# Patient Record
Sex: FEMALE | Race: OTHER | Employment: OTHER | URBAN - METROPOLITAN AREA
[De-identification: names, ages, dates, MRNs, and addresses within clinical notes are randomized per-mention and may not be internally consistent; named-entity substitution may affect disease eponyms.]

---

## 2017-06-07 ENCOUNTER — OFFICE VISIT (OUTPATIENT)
Dept: INTERNAL MEDICINE CLINIC | Facility: CLINIC | Age: 72
End: 2017-06-07

## 2017-06-07 VITALS
WEIGHT: 147 LBS | SYSTOLIC BLOOD PRESSURE: 158 MMHG | BODY MASS INDEX: 28.12 KG/M2 | TEMPERATURE: 98 F | OXYGEN SATURATION: 93 % | HEART RATE: 80 BPM | DIASTOLIC BLOOD PRESSURE: 90 MMHG | HEIGHT: 60.75 IN

## 2017-06-07 DIAGNOSIS — T14.8XXA INJURY TO NERVES: ICD-10-CM

## 2017-06-07 DIAGNOSIS — Z78.0 POSTMENOPAUSAL: ICD-10-CM

## 2017-06-07 DIAGNOSIS — M85.859 OSTEOPENIA OF THIGH, UNSPECIFIED LATERALITY: ICD-10-CM

## 2017-06-07 DIAGNOSIS — E78.00 HYPERCHOLESTEROLEMIA: Primary | ICD-10-CM

## 2017-06-07 DIAGNOSIS — Z12.39 SCREENING FOR MALIGNANT NEOPLASM OF BREAST: ICD-10-CM

## 2017-06-07 PROCEDURE — G0463 HOSPITAL OUTPT CLINIC VISIT: HCPCS | Performed by: INTERNAL MEDICINE

## 2017-06-07 PROCEDURE — 99214 OFFICE O/P EST MOD 30 MIN: CPT | Performed by: INTERNAL MEDICINE

## 2017-06-07 NOTE — PROGRESS NOTES
Sherri Allen is a 70year old female. HPI:   She did well in [place] and had no ED visits, illnesses etc.       SR: no chest pain or sob, no gu or gi sx.         1. Hypercholesterolemia - her LDL has been abound 160 - no meds and no significant past hx of CV RRR without murmur  GI: good BS's,no masses, HSM or tenderness  EXTREMITIES: no cyanosis, clubbing or edema  Breasts: no masses, no axillary adenopathy    ASSESSMENT AND PLAN:   1. Hypercholesterolemia - labs pending      2.  Injury to nerves - LUE doing we

## 2017-06-08 ENCOUNTER — LAB ENCOUNTER (OUTPATIENT)
Dept: LAB | Age: 72
End: 2017-06-08
Attending: INTERNAL MEDICINE
Payer: MEDICARE

## 2017-06-08 DIAGNOSIS — Z12.39 SCREENING FOR MALIGNANT NEOPLASM OF BREAST: ICD-10-CM

## 2017-06-08 DIAGNOSIS — M85.859 OSTEOPENIA OF THIGH, UNSPECIFIED LATERALITY: ICD-10-CM

## 2017-06-08 DIAGNOSIS — Z78.0 POSTMENOPAUSAL: ICD-10-CM

## 2017-06-08 DIAGNOSIS — T14.8XXA INJURY TO NERVES: ICD-10-CM

## 2017-06-08 DIAGNOSIS — E78.00 HYPERCHOLESTEROLEMIA: ICD-10-CM

## 2017-06-08 PROCEDURE — 36415 COLL VENOUS BLD VENIPUNCTURE: CPT

## 2017-06-08 PROCEDURE — 80053 COMPREHEN METABOLIC PANEL: CPT

## 2017-06-08 PROCEDURE — 81015 MICROSCOPIC EXAM OF URINE: CPT

## 2017-06-08 PROCEDURE — 82306 VITAMIN D 25 HYDROXY: CPT

## 2017-06-08 PROCEDURE — 81001 URINALYSIS AUTO W/SCOPE: CPT

## 2017-06-08 PROCEDURE — 84443 ASSAY THYROID STIM HORMONE: CPT

## 2017-06-08 PROCEDURE — 85025 COMPLETE CBC W/AUTO DIFF WBC: CPT

## 2017-06-08 PROCEDURE — 80061 LIPID PANEL: CPT

## 2017-06-18 ENCOUNTER — TELEPHONE (OUTPATIENT)
Dept: INTERNAL MEDICINE CLINIC | Facility: CLINIC | Age: 72
End: 2017-06-18

## 2017-07-07 ENCOUNTER — TELEPHONE (OUTPATIENT)
Dept: INTERNAL MEDICINE CLINIC | Facility: CLINIC | Age: 72
End: 2017-07-07

## 2017-07-07 NOTE — TELEPHONE ENCOUNTER
I discussed positive COLOGUARD test with patient and the rec to proceed with colonoscopy - Evan Ackerman and Catalino Arredondo numbers given to patient. She indicates she will call them for an appointment.

## 2017-08-02 PROBLEM — Z12.11 COLON CANCER SCREENING: Status: ACTIVE | Noted: 2017-08-02

## 2017-08-10 ENCOUNTER — SURGERY (OUTPATIENT)
Age: 72
End: 2017-08-10

## 2017-08-10 ENCOUNTER — HOSPITAL ENCOUNTER (OUTPATIENT)
Facility: HOSPITAL | Age: 72
Setting detail: HOSPITAL OUTPATIENT SURGERY
Discharge: HOME | End: 2017-08-10
Attending: INTERNAL MEDICINE | Admitting: INTERNAL MEDICINE
Payer: MEDICARE

## 2017-08-10 VITALS
HEIGHT: 61 IN | BODY MASS INDEX: 27.19 KG/M2 | RESPIRATION RATE: 19 BRPM | SYSTOLIC BLOOD PRESSURE: 120 MMHG | HEART RATE: 98 BPM | WEIGHT: 144 LBS | OXYGEN SATURATION: 96 % | DIASTOLIC BLOOD PRESSURE: 65 MMHG

## 2017-08-10 DIAGNOSIS — Z12.11 SPECIAL SCREENING FOR MALIGNANT NEOPLASM OF COLON: ICD-10-CM

## 2017-08-10 PROCEDURE — 0DBN8ZX EXCISION OF SIGMOID COLON, VIA NATURAL OR ARTIFICIAL OPENING ENDOSCOPIC, DIAGNOSTIC: ICD-10-PCS | Performed by: INTERNAL MEDICINE

## 2017-08-10 PROCEDURE — 88305 TISSUE EXAM BY PATHOLOGIST: CPT | Performed by: INTERNAL MEDICINE

## 2017-08-10 RX ORDER — SODIUM CHLORIDE 0.9 % (FLUSH) 0.9 %
10 SYRINGE (ML) INJECTION AS NEEDED
Status: DISCONTINUED | OUTPATIENT
Start: 2017-08-10 | End: 2017-08-10

## 2017-08-10 RX ORDER — MIDAZOLAM HYDROCHLORIDE 1 MG/ML
INJECTION INTRAMUSCULAR; INTRAVENOUS
Status: DISCONTINUED | OUTPATIENT
Start: 2017-08-10 | End: 2017-08-10

## 2017-08-10 RX ORDER — MIDAZOLAM HYDROCHLORIDE 1 MG/ML
1 INJECTION INTRAMUSCULAR; INTRAVENOUS EVERY 5 MIN PRN
Status: DISCONTINUED | OUTPATIENT
Start: 2017-08-10 | End: 2017-08-10

## 2017-08-10 RX ORDER — SODIUM CHLORIDE 9 MG/ML
INJECTION, SOLUTION INTRAVENOUS CONTINUOUS
Status: DISCONTINUED | OUTPATIENT
Start: 2017-08-10 | End: 2017-08-10

## 2017-08-10 RX ORDER — SODIUM CHLORIDE, SODIUM LACTATE, POTASSIUM CHLORIDE, CALCIUM CHLORIDE 600; 310; 30; 20 MG/100ML; MG/100ML; MG/100ML; MG/100ML
INJECTION, SOLUTION INTRAVENOUS CONTINUOUS
Status: DISCONTINUED | OUTPATIENT
Start: 2017-08-10 | End: 2017-08-10

## 2017-08-10 NOTE — H&P
[facility] Patient Status:  [facility] Outpatient Surgery    1945 MRN [id number]   Location [facility] ENDOSCOPY LAB SUITES Attending Barron Solis MD   Hosp Day # 0 PCP Naheed Hobson.  Isael normal findings.        Results:     Lab Results  Component Value Date   WBC 7.2 06/08/2017   HGB 13.3 06/08/2017   HCT 40.5 06/08/2017    06/08/2017   CREATSERUM 0.77 06/08/2017   BUN 12 06/08/2017    06/08/2017   K 4.5 06/08/2017    06/

## 2017-08-10 NOTE — OPERATIVE REPORT
[facility]    Colonoscopy Report      Veronica Kumar Patient Status:  [facility]    1945 MRN [id number]   Location [facility] ENDOSCOPY LAB SUITES Attending Ricky Hollingsworth MD       DATE OF OPERATION: 8/10/20 unremarkable. At the anal verge, grade 2 internal hemorrhoids are identified. The quality of the bowel prep was excellent    The colon withdrawal time was 9 minutes.     RECOMMENDATIONS:    High fiber diet  Await path results      Philip Davalos MD  8/10

## 2017-08-15 NOTE — PROGRESS NOTES
Unable to reach pt. Per HIPPA guidelines left detailed voice message both #'s on file re: recommendations/test results noted below by Dr. Corrinne Pleasant. Instructed to call back with any questions or concerns [phone].

## 2017-08-21 ENCOUNTER — HOSPITAL ENCOUNTER (OUTPATIENT)
Dept: BONE DENSITY | Facility: HOSPITAL | Age: 72
End: 2017-08-21
Attending: INTERNAL MEDICINE
Payer: MEDICARE

## 2017-08-21 ENCOUNTER — HOSPITAL ENCOUNTER (OUTPATIENT)
Dept: MAMMOGRAPHY | Facility: HOSPITAL | Age: 72
Discharge: HOME | End: 2017-08-21
Attending: INTERNAL MEDICINE
Payer: MEDICARE

## 2017-08-21 DIAGNOSIS — Z78.0 POSTMENOPAUSAL: ICD-10-CM

## 2017-08-21 DIAGNOSIS — Z12.39 SCREENING FOR MALIGNANT NEOPLASM OF BREAST: ICD-10-CM

## 2017-08-21 PROCEDURE — 77067 SCR MAMMO BI INCL CAD: CPT | Performed by: INTERNAL MEDICINE

## 2017-12-13 ENCOUNTER — TELEPHONE (OUTPATIENT)
Dept: INTERNAL MEDICINE CLINIC | Facility: CLINIC | Age: 72
End: 2017-12-13

## 2017-12-13 ENCOUNTER — OFFICE VISIT (OUTPATIENT)
Dept: INTERNAL MEDICINE CLINIC | Facility: CLINIC | Age: 72
End: 2017-12-13

## 2017-12-13 VITALS
HEIGHT: 61 IN | WEIGHT: 147 LBS | TEMPERATURE: 99 F | SYSTOLIC BLOOD PRESSURE: 180 MMHG | BODY MASS INDEX: 27.75 KG/M2 | HEART RATE: 84 BPM | DIASTOLIC BLOOD PRESSURE: 90 MMHG

## 2017-12-13 DIAGNOSIS — Z12.11 COLON CANCER SCREENING: Primary | ICD-10-CM

## 2017-12-13 DIAGNOSIS — E78.00 HYPERCHOLESTEROLEMIA: ICD-10-CM

## 2017-12-13 DIAGNOSIS — T14.8XXA INJURY TO NERVES: ICD-10-CM

## 2017-12-13 PROCEDURE — G0463 HOSPITAL OUTPT CLINIC VISIT: HCPCS | Performed by: INTERNAL MEDICINE

## 2017-12-13 PROCEDURE — 99214 OFFICE O/P EST MOD 30 MIN: CPT | Performed by: INTERNAL MEDICINE

## 2017-12-13 NOTE — TELEPHONE ENCOUNTER
Called patient per Dr. Kayleigh Chiu, she needs a pneumovax 23. Already ad the prevnar. Left message for patient to make lab appointment before they go back to [place].

## 2017-12-13 NOTE — PROGRESS NOTES
Dar Lopez is a 67year old female. HPI:   She is doing well and has no major complaints. SR: no chest pain or sob, no gu or gi sx. Good energy good appetite. 1. Colon cancer screening -  3 polyps 8/2017      2.  Hypercholesterolemia - she BP (!) 180/90   Pulse 84   Temp 98.8 °F (37.1 °C) (Oral)   Ht 5' 1\" (1.549 m)   Wt 147 lb (66.7 kg)   BMI 27.78 kg/m²   GENERAL: well developed, well nourished,in no apparent distress  SKIN: no rashes,no suspicious lesions  HEENT: atraumatic, normocepha

## 2017-12-22 ENCOUNTER — TELEPHONE (OUTPATIENT)
Dept: INTERNAL MEDICINE CLINIC | Facility: CLINIC | Age: 72
End: 2017-12-22

## 2017-12-22 NOTE — TELEPHONE ENCOUNTER
Tell her I reviewed BP readings and for the most part they are very good - couple readings in the 150s. Continue to monitor her BP maybe once per week and send us the readings maybe in about 2 months.  Call if consistently high readings 150/90 or greate

## 2017-12-22 NOTE — TELEPHONE ENCOUNTER
Patient dropped off blood pressure readings for Dr Kendra Zambrano to review  Please follow up with pt at home# [phone] or cell# [phone]    Readings placed on Dr Joel potter

## 2017-12-22 NOTE — TELEPHONE ENCOUNTER
Patient called back. Relayed Dr Gerry Rios message to her. She verbalized understanding of instructions.

## 2018-06-11 ENCOUNTER — OFFICE VISIT (OUTPATIENT)
Dept: INTERNAL MEDICINE CLINIC | Facility: CLINIC | Age: 73
End: 2018-06-11

## 2018-06-11 VITALS
HEART RATE: 99 BPM | HEIGHT: 61 IN | OXYGEN SATURATION: 98 % | SYSTOLIC BLOOD PRESSURE: 158 MMHG | BODY MASS INDEX: 28.28 KG/M2 | WEIGHT: 149.81 LBS | DIASTOLIC BLOOD PRESSURE: 90 MMHG | TEMPERATURE: 98 F

## 2018-06-11 DIAGNOSIS — Z78.0 POST-MENOPAUSAL: ICD-10-CM

## 2018-06-11 DIAGNOSIS — E78.00 HYPERCHOLESTEROLEMIA: ICD-10-CM

## 2018-06-11 DIAGNOSIS — Z12.39 BREAST CANCER SCREENING: ICD-10-CM

## 2018-06-11 DIAGNOSIS — R42 POSTURAL DIZZINESS WITH PRESYNCOPE: Primary | ICD-10-CM

## 2018-06-11 DIAGNOSIS — R55 POSTURAL DIZZINESS WITH PRESYNCOPE: Primary | ICD-10-CM

## 2018-06-11 PROCEDURE — G0463 HOSPITAL OUTPT CLINIC VISIT: HCPCS | Performed by: INTERNAL MEDICINE

## 2018-06-11 PROCEDURE — 99214 OFFICE O/P EST MOD 30 MIN: CPT | Performed by: INTERNAL MEDICINE

## 2018-06-11 NOTE — PROGRESS NOTES
Mamta Moyer is a 67year old female. HPI:   1.  Postural dizziness with presyncope  On 4/1 she had a nice dinner with her  at a restaurant and 2 glasses of wine and then felt that she might pass out and was taken to [facility], [facility] and kept o heartburn  NEURO: denies headaches    EXAM:   /90 (BP Location: Left arm, Patient Position: Sitting)   Pulse 99   Temp 98.4 °F (36.9 °C) (Oral)   Ht 5' 1\" (1.549 m)   Wt 149 lb 12.8 oz (67.9 kg)   SpO2 98%   BMI 28.30 kg/m²   GENERAL: well developed

## 2018-06-22 ENCOUNTER — HOSPITAL ENCOUNTER (OUTPATIENT)
Dept: CV DIAGNOSTICS | Facility: HOSPITAL | Age: 73
Discharge: HOME | End: 2018-06-22
Attending: INTERNAL MEDICINE
Payer: MEDICARE

## 2018-06-22 DIAGNOSIS — R42 POSTURAL DIZZINESS WITH PRESYNCOPE: ICD-10-CM

## 2018-06-22 DIAGNOSIS — R55 POSTURAL DIZZINESS WITH PRESYNCOPE: ICD-10-CM

## 2018-06-22 PROCEDURE — 93016 CV STRESS TEST SUPVJ ONLY: CPT | Performed by: INTERNAL MEDICINE

## 2018-06-22 PROCEDURE — 93018 CV STRESS TEST I&R ONLY: CPT | Performed by: INTERNAL MEDICINE

## 2018-06-22 PROCEDURE — 93350 STRESS TTE ONLY: CPT | Performed by: INTERNAL MEDICINE

## 2018-06-22 PROCEDURE — 93017 CV STRESS TEST TRACING ONLY: CPT | Performed by: INTERNAL MEDICINE

## 2018-07-02 ENCOUNTER — TELEPHONE (OUTPATIENT)
Dept: INTERNAL MEDICINE CLINIC | Facility: CLINIC | Age: 73
End: 2018-07-02

## 2018-07-02 NOTE — TELEPHONE ENCOUNTER
With regard to prior message on stress echo - I forgot to add she had very poor exercise tolerance (3 min) on treadmill. Otherwise stress echo normal.     Because of this I want her to see cardiologist - Dr Zeferino Guardado or Dr Lissy Chen.

## 2018-07-02 NOTE — TELEPHONE ENCOUNTER
Spoke with pt to inform her of MD message below. Gave contact info for Dr. Jay Pete [phone]. Pt reports her  see's Dr. Shelby Joy and she may prefer to schedule with him instead.

## 2018-07-11 ENCOUNTER — OFFICE VISIT (OUTPATIENT)
Dept: INTERNAL MEDICINE CLINIC | Facility: CLINIC | Age: 73
End: 2018-07-11

## 2018-07-11 VITALS
HEIGHT: 61 IN | SYSTOLIC BLOOD PRESSURE: 156 MMHG | OXYGEN SATURATION: 97 % | WEIGHT: 149 LBS | TEMPERATURE: 99 F | HEART RATE: 91 BPM | BODY MASS INDEX: 28.13 KG/M2 | DIASTOLIC BLOOD PRESSURE: 82 MMHG

## 2018-07-11 DIAGNOSIS — E78.00 HYPERCHOLESTEROLEMIA: Primary | ICD-10-CM

## 2018-07-11 DIAGNOSIS — R94.31 ABNORMAL EKG: ICD-10-CM

## 2018-07-11 DIAGNOSIS — R42 POSTURAL DIZZINESS WITH PRESYNCOPE: ICD-10-CM

## 2018-07-11 DIAGNOSIS — R55 POSTURAL DIZZINESS WITH PRESYNCOPE: ICD-10-CM

## 2018-07-11 PROCEDURE — G0463 HOSPITAL OUTPT CLINIC VISIT: HCPCS | Performed by: INTERNAL MEDICINE

## 2018-07-11 PROCEDURE — 99214 OFFICE O/P EST MOD 30 MIN: CPT | Performed by: INTERNAL MEDICINE

## 2018-07-11 NOTE — PROGRESS NOTES
Stefano Esposito is a 67year old female. HPI:   She feels well. She has not had chest pain. She had normal nuclear stress test but poor exercise capacity - 6 min. I advised she see Dr Trip Youssef but she has not yet made appointment.        SR: no chest pain or so 149 lb (67.6 kg)   SpO2 97%   BMI 28.15 kg/m²   GENERAL: well developed, well nourished,in no apparent distress  SKIN: no rashes,no suspicious lesions  HEENT: atraumatic, normocephalic,ears and throat are clear  NECK: supple,no adenopathy,no bruits  LUNGS:

## 2018-08-23 ENCOUNTER — HOSPITAL ENCOUNTER (OUTPATIENT)
Dept: BONE DENSITY | Facility: HOSPITAL | Age: 73
Discharge: HOME | End: 2018-08-23
Attending: INTERNAL MEDICINE
Payer: MEDICARE

## 2018-08-23 ENCOUNTER — HOSPITAL ENCOUNTER (OUTPATIENT)
Dept: MAMMOGRAPHY | Facility: HOSPITAL | Age: 73
Discharge: HOME | End: 2018-08-23
Attending: INTERNAL MEDICINE
Payer: MEDICARE

## 2018-08-23 DIAGNOSIS — Z78.0 POST-MENOPAUSAL: ICD-10-CM

## 2018-08-23 DIAGNOSIS — Z12.39 BREAST CANCER SCREENING: ICD-10-CM

## 2018-08-23 PROCEDURE — 77080 DXA BONE DENSITY AXIAL: CPT | Performed by: INTERNAL MEDICINE

## 2018-08-23 PROCEDURE — 77067 SCR MAMMO BI INCL CAD: CPT | Performed by: INTERNAL MEDICINE

## 2018-08-26 ENCOUNTER — TELEPHONE (OUTPATIENT)
Dept: INTERNAL MEDICINE CLINIC | Facility: CLINIC | Age: 73
End: 2018-08-26

## 2018-08-27 NOTE — TELEPHONE ENCOUNTER
Spoke with patient and relayed Dr. Dk Lundberg message. Patient verbalized an understanding. Med Module updated.

## 2018-12-10 ENCOUNTER — OFFICE VISIT (OUTPATIENT)
Dept: INTERNAL MEDICINE CLINIC | Facility: CLINIC | Age: 73
End: 2018-12-10
Payer: MEDICARE

## 2018-12-10 VITALS
HEIGHT: 60 IN | HEART RATE: 82 BPM | SYSTOLIC BLOOD PRESSURE: 138 MMHG | DIASTOLIC BLOOD PRESSURE: 88 MMHG | WEIGHT: 142 LBS | BODY MASS INDEX: 27.88 KG/M2 | TEMPERATURE: 98 F | OXYGEN SATURATION: 98 %

## 2018-12-10 DIAGNOSIS — T14.8XXA INJURY TO NERVES: ICD-10-CM

## 2018-12-10 DIAGNOSIS — M85.859 OSTEOPENIA OF THIGH, UNSPECIFIED LATERALITY: Primary | ICD-10-CM

## 2018-12-10 DIAGNOSIS — E78.00 HYPERCHOLESTEROLEMIA: ICD-10-CM

## 2018-12-10 PROCEDURE — 99214 OFFICE O/P EST MOD 30 MIN: CPT | Performed by: INTERNAL MEDICINE

## 2018-12-10 PROCEDURE — G0463 HOSPITAL OUTPT CLINIC VISIT: HCPCS | Performed by: INTERNAL MEDICINE

## 2018-12-10 NOTE — PROGRESS NOTES
Willie Jaquez is a 68year old female. HPI:   She is doing well and no new issues or problems. No ED or UC visits. Good energy good appetite. Recent DEXA - increasing osteopenia and OsCal bid. SR: no chest pain or sob, no gu or gi sx.     BP Re kg)   SpO2 98%   BMI 27.73 kg/m²   GENERAL: well developed, well nourished,in no apparent distress  SKIN: no rashes,no suspicious lesions  HEENT: atraumatic, normocephalic,ears and throat are clear  NECK: supple,no adenopathy,no bruits  LUNGS: clear to Wilver Crys

## 2018-12-12 ENCOUNTER — APPOINTMENT (OUTPATIENT)
Dept: GENERAL RADIOLOGY | Facility: HOSPITAL | Age: 73
End: 2018-12-12
Payer: MEDICARE

## 2018-12-12 ENCOUNTER — HOSPITAL ENCOUNTER (EMERGENCY)
Facility: HOSPITAL | Age: 73
Discharge: HOME | End: 2018-12-12
Attending: EMERGENCY MEDICINE
Payer: MEDICARE

## 2018-12-12 VITALS
SYSTOLIC BLOOD PRESSURE: 178 MMHG | OXYGEN SATURATION: 96 % | DIASTOLIC BLOOD PRESSURE: 82 MMHG | RESPIRATION RATE: 20 BRPM | HEART RATE: 88 BPM | TEMPERATURE: 98 F

## 2018-12-12 DIAGNOSIS — S42.254A CLOSED NONDISPLACED FRACTURE OF GREATER TUBEROSITY OF RIGHT HUMERUS, INITIAL ENCOUNTER: Primary | ICD-10-CM

## 2018-12-12 PROCEDURE — 73030 X-RAY EXAM OF SHOULDER: CPT | Performed by: EMERGENCY MEDICINE

## 2018-12-12 PROCEDURE — 99284 EMERGENCY DEPT VISIT MOD MDM: CPT

## 2018-12-12 RX ORDER — TRAMADOL HYDROCHLORIDE 50 MG/1
50 TABLET ORAL EVERY 12 HOURS PRN
Qty: 6 TABLET | Refills: 0 | Status: SHIPPED | OUTPATIENT
Start: 2018-12-12 | End: 2018-12-15

## 2018-12-12 RX ORDER — LIDOCAINE 50 MG/G
1 PATCH TOPICAL EVERY 24 HOURS
Qty: 7 PATCH | Refills: 0 | Status: SHIPPED | OUTPATIENT
Start: 2018-12-12 | End: 2018-12-19

## 2018-12-13 NOTE — ED NOTES
Pt states she tripped over the open oven door landing on rt arm/shoulder. Denies hitting head or LOC. Pt has limited movement of rt shoulder. CMS (+). States took 2 advil at 1800.

## 2018-12-13 NOTE — ED PROVIDER NOTES
Patient Seen in: [facility] AND [facility] Emergency Department    History   Patient presents with:  Upper Extremity Injury (musculoskeletal)    Stated Complaint: fall     HPI    67 yo F without PMH and right hand domiant presenting for evaluation of right anterio All other systems reviewed and negative except as noted above. PSFH elements reviewed from today and agreed except as otherwise stated in HPI.     Physical Exam     ED Triage Vitals [12/12/18 1949]   BP (!) 182/87   Pulse 110   Resp 19   Temp 98.5 °F nondisplaced fracture of the right humeral head involving the greater tuberosity. 2. Surgical neck of the humerus appears probably intact. Glenohumeral joint unremarkable. Mild degenerative changes acromioclavicular joint.  3. Generalized mineralization con 0    TraMADol HCl 50 MG Oral Tab  Take 1 tablet (50 mg total) by mouth every 12 (twelve) hours as needed for Pain (Use caution while driving or operating machinery, this medication may make you drowsy. ).   Qty: 6 tablet Refills: 0

## 2018-12-14 ENCOUNTER — OFFICE VISIT (OUTPATIENT)
Dept: INTERNAL MEDICINE CLINIC | Facility: CLINIC | Age: 73
End: 2018-12-14
Payer: MEDICARE

## 2018-12-14 VITALS
TEMPERATURE: 98 F | HEIGHT: 61 IN | SYSTOLIC BLOOD PRESSURE: 148 MMHG | HEART RATE: 82 BPM | WEIGHT: 142 LBS | BODY MASS INDEX: 26.81 KG/M2 | OXYGEN SATURATION: 99 % | DIASTOLIC BLOOD PRESSURE: 86 MMHG

## 2018-12-14 DIAGNOSIS — S42.91XA CLOSED FRACTURE OF RIGHT SHOULDER, INITIAL ENCOUNTER: Primary | ICD-10-CM

## 2018-12-14 PROCEDURE — G0463 HOSPITAL OUTPT CLINIC VISIT: HCPCS | Performed by: INTERNAL MEDICINE

## 2018-12-14 PROCEDURE — 99213 OFFICE O/P EST LOW 20 MIN: CPT | Performed by: INTERNAL MEDICINE

## 2018-12-14 NOTE — PROGRESS NOTES
Robert Horvath is a 68year old female. HPI:   She fell in kitchen Wed - sustained fracture of the humeral head in good alignment. She did not hit her head -  She landed on her right shoulder. Went to ED. SR: no chest pain or sob, no gu or gi sx. otherwise  SKIN: denies any unusual skin lesions or rashes  RESPIRATORY: denies shortness of breath with exertion  CARDIOVASCULAR: denies chest pain on exertion  GI: denies abdominal pain and denies heartburn  NEURO: denies headaches    EXAM:   /86

## 2019-06-10 ENCOUNTER — OFFICE VISIT (OUTPATIENT)
Dept: INTERNAL MEDICINE CLINIC | Facility: CLINIC | Age: 74
End: 2019-06-10
Payer: MEDICARE

## 2019-06-10 VITALS
DIASTOLIC BLOOD PRESSURE: 90 MMHG | OXYGEN SATURATION: 99 % | WEIGHT: 144 LBS | RESPIRATION RATE: 16 BRPM | HEIGHT: 61 IN | TEMPERATURE: 99 F | SYSTOLIC BLOOD PRESSURE: 164 MMHG | BODY MASS INDEX: 27.19 KG/M2 | HEART RATE: 105 BPM

## 2019-06-10 DIAGNOSIS — Z12.39 BREAST CANCER SCREENING: ICD-10-CM

## 2019-06-10 DIAGNOSIS — M85.859 OSTEOPENIA OF THIGH, UNSPECIFIED LATERALITY: ICD-10-CM

## 2019-06-10 DIAGNOSIS — E78.00 HYPERCHOLESTEROLEMIA: Primary | ICD-10-CM

## 2019-06-10 PROCEDURE — 99214 OFFICE O/P EST MOD 30 MIN: CPT | Performed by: INTERNAL MEDICINE

## 2019-06-10 PROCEDURE — G0463 HOSPITAL OUTPT CLINIC VISIT: HCPCS | Performed by: INTERNAL MEDICINE

## 2019-06-10 RX ORDER — ENALAPRIL MALEATE 5 MG/1
5 TABLET ORAL DAILY
Qty: 30 TABLET | Refills: 11 | Status: SHIPPED | OUTPATIENT
Start: 2019-06-10 | End: 2020-06-04

## 2019-06-10 NOTE — PROGRESS NOTES
Dar Lopez is a 68year old female. HPI:   She is doing well and had a good season in [facility] - her left shoulder fracture has fully healed and no pain and no difficulty with rom.      F/u BP    F/u hypercholesterolemia. -  LDL in range of 150 - she has nev heartburn  NEURO: denies headaches    EXAM:   BP (!) 164/90   Pulse 105   Temp 98.7 °F (37.1 °C) (Oral)   Resp 16   Ht 5' 1\" (1.549 m)   Wt 144 lb (65.3 kg)   SpO2 99%   BMI 27.21 kg/m²   GENERAL: well developed, well nourished,in no apparent distress  SK

## 2019-06-11 ENCOUNTER — LAB ENCOUNTER (OUTPATIENT)
Dept: LAB | Age: 74
End: 2019-06-11
Attending: INTERNAL MEDICINE
Payer: MEDICARE

## 2019-06-11 DIAGNOSIS — E78.00 HYPERCHOLESTEROLEMIA: ICD-10-CM

## 2019-06-11 DIAGNOSIS — M85.859 OSTEOPENIA OF THIGH, UNSPECIFIED LATERALITY: ICD-10-CM

## 2019-06-11 PROCEDURE — 82306 VITAMIN D 25 HYDROXY: CPT

## 2019-06-11 PROCEDURE — 87086 URINE CULTURE/COLONY COUNT: CPT | Performed by: INTERNAL MEDICINE

## 2019-06-11 PROCEDURE — 85025 COMPLETE CBC W/AUTO DIFF WBC: CPT

## 2019-06-11 PROCEDURE — 81001 URINALYSIS AUTO W/SCOPE: CPT

## 2019-06-11 PROCEDURE — 36415 COLL VENOUS BLD VENIPUNCTURE: CPT

## 2019-06-11 PROCEDURE — 80053 COMPREHEN METABOLIC PANEL: CPT

## 2019-06-11 PROCEDURE — 87077 CULTURE AEROBIC IDENTIFY: CPT | Performed by: INTERNAL MEDICINE

## 2019-06-11 PROCEDURE — 81015 MICROSCOPIC EXAM OF URINE: CPT

## 2019-06-11 PROCEDURE — 80061 LIPID PANEL: CPT

## 2019-06-11 PROCEDURE — 84443 ASSAY THYROID STIM HORMONE: CPT

## 2019-06-18 ENCOUNTER — TELEPHONE (OUTPATIENT)
Dept: INTERNAL MEDICINE CLINIC | Facility: CLINIC | Age: 74
End: 2019-06-18

## 2019-06-18 RX ORDER — AMOXICILLIN 500 MG/1
500 CAPSULE ORAL 2 TIMES DAILY
Qty: 10 CAPSULE | Refills: 0 | Status: SHIPPED | OUTPATIENT
Start: 2019-06-18 | End: 2019-06-23

## 2019-06-18 NOTE — TELEPHONE ENCOUNTER
Recent labs: she has bladder infection - she is probably asymptomatic - start ampicillin 500 mg bid for 5 days.     Her chol is still high - I think we should start a small dose of medication to lower - if she agrees give atorvastatin 10 mg daily indefinite

## 2019-06-18 NOTE — TELEPHONE ENCOUNTER
To Dr. Yolanda Finch - your message relayed, understanding verbalized. Pt declines atorvastatin at present, would like to think about it.     Pt denies dysuria/fever/chills - states only UTI sx she has noticed is that urine stream onset takes a few second to begin

## 2019-08-12 ENCOUNTER — OFFICE VISIT (OUTPATIENT)
Dept: INTERNAL MEDICINE CLINIC | Facility: CLINIC | Age: 74
End: 2019-08-12
Payer: MEDICARE

## 2019-08-12 VITALS
BODY MASS INDEX: 28 KG/M2 | WEIGHT: 146 LBS | SYSTOLIC BLOOD PRESSURE: 130 MMHG | RESPIRATION RATE: 12 BRPM | DIASTOLIC BLOOD PRESSURE: 86 MMHG | TEMPERATURE: 98 F | OXYGEN SATURATION: 96 % | HEART RATE: 90 BPM

## 2019-08-12 DIAGNOSIS — I10 ESSENTIAL HYPERTENSION: ICD-10-CM

## 2019-08-12 DIAGNOSIS — E78.00 HYPERCHOLESTEROLEMIA: Primary | ICD-10-CM

## 2019-08-12 PROCEDURE — 99213 OFFICE O/P EST LOW 20 MIN: CPT | Performed by: INTERNAL MEDICINE

## 2019-08-12 PROCEDURE — G0463 HOSPITAL OUTPT CLINIC VISIT: HCPCS | Performed by: INTERNAL MEDICINE

## 2019-08-12 NOTE — PROGRESS NOTES
Maine Baires is a 76year old female. HPI:   I started her on enalapril the last 2 months - her BP has been excellent since then and ave about 120/75. She is feeling well. Otherwise doing well. Good energy good appetite.       SR: no chest pain o and denies heartburn  NEURO: denies headaches    EXAM:   /86 (BP Location: Right arm, Patient Position: Sitting)   Pulse 90   Temp 98.4 °F (36.9 °C) (Oral)   Resp 12   Wt 146 lb (66.2 kg)   SpO2 96%   BMI 27.59 kg/m²   GENERAL: well developed, well n

## 2019-08-26 ENCOUNTER — HOSPITAL ENCOUNTER (OUTPATIENT)
Dept: MAMMOGRAPHY | Facility: HOSPITAL | Age: 74
Discharge: HOME | End: 2019-08-26
Attending: INTERNAL MEDICINE
Payer: MEDICARE

## 2019-08-26 DIAGNOSIS — Z12.39 BREAST CANCER SCREENING: ICD-10-CM

## 2019-08-26 PROCEDURE — 77063 BREAST TOMOSYNTHESIS BI: CPT | Performed by: INTERNAL MEDICINE

## 2019-08-26 PROCEDURE — 77067 SCR MAMMO BI INCL CAD: CPT | Performed by: INTERNAL MEDICINE

## 2019-12-03 ENCOUNTER — OFFICE VISIT (OUTPATIENT)
Dept: INTERNAL MEDICINE CLINIC | Facility: CLINIC | Age: 74
End: 2019-12-03
Payer: MEDICARE

## 2019-12-03 VITALS
SYSTOLIC BLOOD PRESSURE: 126 MMHG | OXYGEN SATURATION: 98 % | BODY MASS INDEX: 27.75 KG/M2 | WEIGHT: 147 LBS | DIASTOLIC BLOOD PRESSURE: 78 MMHG | HEART RATE: 89 BPM | HEIGHT: 61 IN | TEMPERATURE: 98 F

## 2019-12-03 DIAGNOSIS — I10 ESSENTIAL HYPERTENSION: Primary | ICD-10-CM

## 2019-12-03 DIAGNOSIS — M85.859 OSTEOPENIA OF THIGH, UNSPECIFIED LATERALITY: ICD-10-CM

## 2019-12-03 DIAGNOSIS — E78.00 HYPERCHOLESTEROLEMIA: ICD-10-CM

## 2019-12-03 PROCEDURE — G0463 HOSPITAL OUTPT CLINIC VISIT: HCPCS | Performed by: INTERNAL MEDICINE

## 2019-12-03 PROCEDURE — 99214 OFFICE O/P EST MOD 30 MIN: CPT | Performed by: INTERNAL MEDICINE

## 2019-12-03 NOTE — PROGRESS NOTES
Jackie Suresh is a 76year old female. HPI:   She has been doing well and no new issues or problems. No ED or UC visits. Good energy good appetite. Neuropathy left hand doing well and she saw Dr Tori García recently    1.  Essential hypertension  She occas GENERAL HEALTH: feels well otherwise  SKIN: denies any unusual skin lesions or rashes  RESPIRATORY: denies shortness of breath with exertion  CARDIOVASCULAR: denies chest pain on exertion  GI: denies abdominal pain and denies heartburn  NEURO: denies hea

## 2020-06-03 ENCOUNTER — TELEPHONE (OUTPATIENT)
Dept: INTERNAL MEDICINE CLINIC | Facility: CLINIC | Age: 75
End: 2020-06-03

## 2020-06-03 ENCOUNTER — LAB ENCOUNTER (OUTPATIENT)
Dept: LAB | Age: 75
End: 2020-06-03
Attending: INTERNAL MEDICINE
Payer: MEDICARE

## 2020-06-03 ENCOUNTER — OFFICE VISIT (OUTPATIENT)
Dept: INTERNAL MEDICINE CLINIC | Facility: CLINIC | Age: 75
End: 2020-06-03
Payer: MEDICARE

## 2020-06-03 VITALS
DIASTOLIC BLOOD PRESSURE: 98 MMHG | HEIGHT: 61 IN | SYSTOLIC BLOOD PRESSURE: 156 MMHG | HEART RATE: 84 BPM | WEIGHT: 149.19 LBS | TEMPERATURE: 98 F | OXYGEN SATURATION: 97 % | BODY MASS INDEX: 28.17 KG/M2

## 2020-06-03 DIAGNOSIS — Z12.39 BREAST CANCER SCREENING: ICD-10-CM

## 2020-06-03 DIAGNOSIS — Z12.31 ENCOUNTER FOR SCREENING MAMMOGRAM FOR BREAST CANCER: Primary | ICD-10-CM

## 2020-06-03 DIAGNOSIS — I10 ESSENTIAL HYPERTENSION: Primary | ICD-10-CM

## 2020-06-03 DIAGNOSIS — R42 POSTURAL DIZZINESS WITH PRESYNCOPE: ICD-10-CM

## 2020-06-03 DIAGNOSIS — R55 POSTURAL DIZZINESS WITH PRESYNCOPE: ICD-10-CM

## 2020-06-03 DIAGNOSIS — Z78.0 POSTMENOPAUSAL: ICD-10-CM

## 2020-06-03 DIAGNOSIS — E78.00 HYPERCHOLESTEROLEMIA: ICD-10-CM

## 2020-06-03 DIAGNOSIS — T14.8XXA INJURY TO NERVES: ICD-10-CM

## 2020-06-03 PROCEDURE — 84443 ASSAY THYROID STIM HORMONE: CPT

## 2020-06-03 PROCEDURE — 81001 URINALYSIS AUTO W/SCOPE: CPT | Performed by: INTERNAL MEDICINE

## 2020-06-03 PROCEDURE — G0463 HOSPITAL OUTPT CLINIC VISIT: HCPCS | Performed by: INTERNAL MEDICINE

## 2020-06-03 PROCEDURE — 99214 OFFICE O/P EST MOD 30 MIN: CPT | Performed by: INTERNAL MEDICINE

## 2020-06-03 PROCEDURE — 87086 URINE CULTURE/COLONY COUNT: CPT | Performed by: INTERNAL MEDICINE

## 2020-06-03 PROCEDURE — 80053 COMPREHEN METABOLIC PANEL: CPT

## 2020-06-03 PROCEDURE — 36415 COLL VENOUS BLD VENIPUNCTURE: CPT

## 2020-06-03 PROCEDURE — 80061 LIPID PANEL: CPT

## 2020-06-03 PROCEDURE — 85025 COMPLETE CBC W/AUTO DIFF WBC: CPT

## 2020-06-03 NOTE — TELEPHONE ENCOUNTER
Called Kalie from central scheduling and relayed patrick nicholas new mammogram order has been entered - verbalized understanding

## 2020-06-03 NOTE — TELEPHONE ENCOUNTER
Please call Kaleigh/Central Scheduling at [phone] ext [phone]  Need new Mammogram order   Codes not meeting Medicare necessity  #[phone]  Tasked to nursing

## 2020-06-03 NOTE — PROGRESS NOTES
Robert Horvath is a 76year old female. HPI:   She had a good season in [facility] - no ED or UC visits. Good energy, good appetite. HTN - good home readings, ave about 130/80.     Some discomfort right knee from injury about 15 ys ago but she does not fee denies shortness of breath with exertion  CARDIOVASCULAR: denies chest pain on exertion  GI: denies abdominal pain and denies heartburn  NEURO: denies headaches    EXAM:   BP (!) 156/98 (BP Location: Left arm, Patient Position: Sitting, Cuff Size: adult)

## 2020-06-04 RX ORDER — ENALAPRIL MALEATE 5 MG/1
TABLET ORAL
Qty: 30 TABLET | Refills: 11 | Status: SHIPPED | OUTPATIENT
Start: 2020-06-04 | End: 2021-06-07

## 2020-06-09 ENCOUNTER — TELEPHONE (OUTPATIENT)
Dept: INTERNAL MEDICINE CLINIC | Facility: CLINIC | Age: 75
End: 2020-06-09

## 2020-06-09 NOTE — TELEPHONE ENCOUNTER
Recent labs including CBC chemistries thyroid are all normal.  Urine specimen showed a few white blood cells but culture was negative.     Cholesterol a little high as before diet and exercise

## 2020-07-31 ENCOUNTER — OFFICE VISIT (OUTPATIENT)
Dept: INTERNAL MEDICINE CLINIC | Facility: CLINIC | Age: 75
End: 2020-07-31
Payer: MEDICARE

## 2020-07-31 ENCOUNTER — HOSPITAL ENCOUNTER (OUTPATIENT)
Dept: GENERAL RADIOLOGY | Facility: HOSPITAL | Age: 75
Discharge: HOME | End: 2020-07-31
Attending: INTERNAL MEDICINE | Admitting: INTERNAL MEDICINE
Payer: MEDICARE

## 2020-07-31 VITALS
BODY MASS INDEX: 28.32 KG/M2 | RESPIRATION RATE: 16 BRPM | OXYGEN SATURATION: 99 % | WEIGHT: 150 LBS | SYSTOLIC BLOOD PRESSURE: 180 MMHG | HEIGHT: 61 IN | HEART RATE: 90 BPM | DIASTOLIC BLOOD PRESSURE: 100 MMHG | TEMPERATURE: 97 F

## 2020-07-31 DIAGNOSIS — M77.9 TENDONITIS: ICD-10-CM

## 2020-07-31 DIAGNOSIS — M25.561 ACUTE PAIN OF RIGHT KNEE: ICD-10-CM

## 2020-07-31 DIAGNOSIS — I10 ESSENTIAL HYPERTENSION: ICD-10-CM

## 2020-07-31 DIAGNOSIS — M25.561 ACUTE PAIN OF RIGHT KNEE: Primary | ICD-10-CM

## 2020-07-31 PROCEDURE — G0463 HOSPITAL OUTPT CLINIC VISIT: HCPCS | Performed by: INTERNAL MEDICINE

## 2020-07-31 PROCEDURE — 20610 DRAIN/INJ JOINT/BURSA W/O US: CPT | Performed by: INTERNAL MEDICINE

## 2020-07-31 PROCEDURE — 73564 X-RAY EXAM KNEE 4 OR MORE: CPT | Performed by: INTERNAL MEDICINE

## 2020-07-31 PROCEDURE — 99213 OFFICE O/P EST LOW 20 MIN: CPT | Performed by: INTERNAL MEDICINE

## 2020-07-31 RX ORDER — TRIAMCINOLONE ACETONIDE 40 MG/ML
40 INJECTION, SUSPENSION INTRA-ARTICULAR; INTRAMUSCULAR ONCE
Status: COMPLETED | OUTPATIENT
Start: 2020-07-31 | End: 2020-07-31

## 2020-07-31 RX ORDER — OMEGA-3 FATTY ACIDS/FISH OIL 300-1000MG
1-2 CAPSULE ORAL AS NEEDED
COMMUNITY

## 2020-07-31 RX ADMIN — TRIAMCINOLONE ACETONIDE 40 MG: 40 INJECTION, SUSPENSION INTRA-ARTICULAR; INTRAMUSCULAR at 10:05:00

## 2020-07-31 NOTE — PROGRESS NOTES
Jackie Vasquez is a 76year old female. HPI:   She had sudden pain in the right knee that occurred about 5 weeks ago when she turned at the bottom of the stairs in the basement. She did not fall.  She used to walk for 45 min per day and now can only walk a headaches    EXAM:   BP (!) 180/100   Pulse 90   Temp 96.9 °F (36.1 °C) (Temporal)   Resp 16   Ht 5' 1\" (1.549 m)   Wt 150 lb (68 kg)   SpO2 99%   BMI 28.34 kg/m²   GENERAL: well developed, well nourished,in no apparent distress    EXTREMITIES: no cyanosi

## 2020-08-03 ENCOUNTER — TELEPHONE (OUTPATIENT)
Dept: INTERNAL MEDICINE CLINIC | Facility: CLINIC | Age: 75
End: 2020-08-03

## 2020-08-03 NOTE — TELEPHONE ENCOUNTER
She has moderate arthritis in the right knee. How is she feeling after the steroid injection that I gave her?   If no improvement she will need to see orthopedics

## 2020-08-03 NOTE — TELEPHONE ENCOUNTER
Spoke to patient and relayed MD message, patient verbalized understanding. Patient reports she feels \"much much better\". The pain has totally resolved. Advised patient to call office if her symptoms return.      fyi to Dr. Gemma Orellana

## 2020-08-28 ENCOUNTER — HOSPITAL ENCOUNTER (OUTPATIENT)
Dept: MAMMOGRAPHY | Facility: HOSPITAL | Age: 75
Discharge: HOME | End: 2020-08-28
Attending: INTERNAL MEDICINE
Payer: MEDICARE

## 2020-08-28 ENCOUNTER — HOSPITAL ENCOUNTER (OUTPATIENT)
Dept: BONE DENSITY | Facility: HOSPITAL | Age: 75
Discharge: HOME | End: 2020-08-28
Attending: INTERNAL MEDICINE
Payer: MEDICARE

## 2020-08-28 DIAGNOSIS — Z78.0 POSTMENOPAUSAL: ICD-10-CM

## 2020-08-28 DIAGNOSIS — Z12.39 BREAST CANCER SCREENING: ICD-10-CM

## 2020-08-28 PROCEDURE — 77080 DXA BONE DENSITY AXIAL: CPT | Performed by: INTERNAL MEDICINE

## 2020-08-28 PROCEDURE — 77067 SCR MAMMO BI INCL CAD: CPT | Performed by: INTERNAL MEDICINE

## 2020-08-28 PROCEDURE — 77063 BREAST TOMOSYNTHESIS BI: CPT | Performed by: INTERNAL MEDICINE

## 2020-08-29 ENCOUNTER — TELEPHONE (OUTPATIENT)
Dept: INTERNAL MEDICINE CLINIC | Facility: CLINIC | Age: 75
End: 2020-08-29

## 2020-08-29 NOTE — TELEPHONE ENCOUNTER
Recent bone density show bones are a little thin but not osteoporosis continue Os-Mihir twice daily.     I will recheck vitamin D level next time we draw her blood

## 2020-08-31 NOTE — TELEPHONE ENCOUNTER
Patient called and relayed Dr Linda Jackson message. Patient verbalized understanding with no further questions noted.

## 2020-11-03 ENCOUNTER — TELEPHONE (OUTPATIENT)
Dept: INTERNAL MEDICINE CLINIC | Facility: CLINIC | Age: 75
End: 2020-11-03

## 2020-11-13 ENCOUNTER — TELEPHONE (OUTPATIENT)
Dept: INTERNAL MEDICINE CLINIC | Facility: CLINIC | Age: 75
End: 2020-11-13

## 2020-11-13 NOTE — TELEPHONE ENCOUNTER
To Dr. Siomara Krause-----    Please advise in MD absence. Pt reports about 2 weeks ago she woke up with pain on the R buttocks that radiates down the R leg to the R calf. Reports pain fluctuates and is intermittent but has gotten as high as 8/10 and as low as 5/10. Reports she is able to walk, but it is painful at times. Denies injury, heavy lifting or falls. States pain is worse with movement and relieved with relaxation/laying down. Pt has been taking Advil and using heating pad but reports no improvement in pain. Please advise.

## 2020-11-13 NOTE — TELEPHONE ENCOUNTER
Please call pt  Has been in pain for 2 weeks on right side/buttocks, having a hard time walking  Tasked to nursing

## 2020-11-14 NOTE — TELEPHONE ENCOUNTER
Telephone call to patient and situation discussed. Patient's been having pain from her right buttocks that goes down her right legs on and off recently. I have recommended to the patient that she take her ibuprofen 2 tablets 3 or 4 times a day with food or meals. I have also recommended that she come in next week so that Dr. Leta Bhakta can evaluate her. I will forward this message to Dr. Leta Bhakta.

## 2020-11-16 ENCOUNTER — OFFICE VISIT (OUTPATIENT)
Dept: INTERNAL MEDICINE CLINIC | Facility: CLINIC | Age: 75
End: 2020-11-16
Payer: MEDICARE

## 2020-11-16 VITALS
TEMPERATURE: 98 F | HEART RATE: 95 BPM | OXYGEN SATURATION: 98 % | BODY MASS INDEX: 28.32 KG/M2 | SYSTOLIC BLOOD PRESSURE: 142 MMHG | WEIGHT: 150 LBS | HEIGHT: 61 IN | DIASTOLIC BLOOD PRESSURE: 108 MMHG

## 2020-11-16 DIAGNOSIS — I10 ESSENTIAL HYPERTENSION: ICD-10-CM

## 2020-11-16 DIAGNOSIS — M46.1 SACROILIITIS (HCC): Primary | ICD-10-CM

## 2020-11-16 PROCEDURE — G0463 HOSPITAL OUTPT CLINIC VISIT: HCPCS | Performed by: INTERNAL MEDICINE

## 2020-11-16 PROCEDURE — 99213 OFFICE O/P EST LOW 20 MIN: CPT | Performed by: INTERNAL MEDICINE

## 2020-11-16 PROCEDURE — 20610 DRAIN/INJ JOINT/BURSA W/O US: CPT | Performed by: INTERNAL MEDICINE

## 2020-11-16 RX ORDER — TRIAMCINOLONE ACETONIDE 40 MG/ML
40 INJECTION, SUSPENSION INTRA-ARTICULAR; INTRAMUSCULAR ONCE
Status: COMPLETED | OUTPATIENT
Start: 2020-11-16 | End: 2020-11-16

## 2020-11-16 NOTE — PROGRESS NOTES
Emelyn Muñoz is a 76year old female. HPI:   She awakened about 2 weeks ago with severe pain in the right SI region, radiated into right buttock and also in right calf. No fall or injury. She had much difficulty walking.  She has improved over the last 2 heartburn  NEURO: denies headaches    EXAM:   BP (!) 142/108   Pulse 95   Temp 98.3 °F (36.8 °C) (Oral)   Ht 5' 1\" (1.549 m)   Wt 150 lb (68 kg)   SpO2 98%   BMI 28.34 kg/m²   GENERAL: well developed, well nourished,in no apparent distress  SKIN: no rashe

## 2020-11-20 ENCOUNTER — TELEPHONE (OUTPATIENT)
Dept: INTERNAL MEDICINE CLINIC | Facility: CLINIC | Age: 75
End: 2020-11-20

## 2020-12-09 ENCOUNTER — OFFICE VISIT (OUTPATIENT)
Dept: INTERNAL MEDICINE CLINIC | Facility: CLINIC | Age: 75
End: 2020-12-09
Payer: MEDICARE

## 2020-12-09 VITALS
OXYGEN SATURATION: 99 % | TEMPERATURE: 99 F | BODY MASS INDEX: 27.98 KG/M2 | SYSTOLIC BLOOD PRESSURE: 140 MMHG | HEIGHT: 61 IN | WEIGHT: 148.19 LBS | HEART RATE: 91 BPM | DIASTOLIC BLOOD PRESSURE: 82 MMHG

## 2020-12-09 DIAGNOSIS — E78.00 HYPERCHOLESTEROLEMIA: Primary | ICD-10-CM

## 2020-12-09 DIAGNOSIS — M46.1 SACROILIITIS (HCC): ICD-10-CM

## 2020-12-09 DIAGNOSIS — I10 ESSENTIAL HYPERTENSION: ICD-10-CM

## 2020-12-09 PROCEDURE — G0463 HOSPITAL OUTPT CLINIC VISIT: HCPCS | Performed by: INTERNAL MEDICINE

## 2020-12-09 PROCEDURE — 99214 OFFICE O/P EST MOD 30 MIN: CPT | Performed by: INTERNAL MEDICINE

## 2020-12-09 NOTE — PROGRESS NOTES
Jackie Suresh is a 76year old female. HPI:   She is here for check up. Right sides sciatica markedly improved. Otherwise she is feeling well. No new issues. No ED or UC visits. Good energy good appetite.      Hypercholesterolemia     HTN     SR: no c 140/82   Pulse 91   Temp 98.5 °F (36.9 °C) (Oral)   Ht 5' 1\" (1.549 m)   Wt 148 lb 3.2 oz (67.2 kg)   SpO2 99%   BMI 28.00 kg/m²   GENERAL: well developed, well nourished,in no apparent distress  SKIN: no rashes,no suspicious lesions  HEENT: atraumatic, n

## 2021-03-12 DIAGNOSIS — Z23 NEED FOR VACCINATION: ICD-10-CM

## 2021-05-21 ENCOUNTER — APPOINTMENT (OUTPATIENT)
Dept: CT IMAGING | Facility: HOSPITAL | Age: 76
End: 2021-05-21
Attending: EMERGENCY MEDICINE
Payer: MEDICARE

## 2021-05-21 ENCOUNTER — HOSPITAL ENCOUNTER (EMERGENCY)
Facility: HOSPITAL | Age: 76
Discharge: HOME | End: 2021-05-21
Attending: EMERGENCY MEDICINE
Payer: MEDICARE

## 2021-05-21 VITALS
TEMPERATURE: 98 F | RESPIRATION RATE: 18 BRPM | DIASTOLIC BLOOD PRESSURE: 94 MMHG | OXYGEN SATURATION: 96 % | SYSTOLIC BLOOD PRESSURE: 173 MMHG | WEIGHT: 146 LBS | BODY MASS INDEX: 28 KG/M2 | HEART RATE: 109 BPM

## 2021-05-21 DIAGNOSIS — N12 PYELONEPHRITIS: Primary | ICD-10-CM

## 2021-05-21 PROCEDURE — 81001 URINALYSIS AUTO W/SCOPE: CPT | Performed by: EMERGENCY MEDICINE

## 2021-05-21 PROCEDURE — 99284 EMERGENCY DEPT VISIT MOD MDM: CPT

## 2021-05-21 PROCEDURE — 96365 THER/PROPH/DIAG IV INF INIT: CPT

## 2021-05-21 PROCEDURE — 87086 URINE CULTURE/COLONY COUNT: CPT | Performed by: EMERGENCY MEDICINE

## 2021-05-21 PROCEDURE — 80048 BASIC METABOLIC PNL TOTAL CA: CPT | Performed by: EMERGENCY MEDICINE

## 2021-05-21 PROCEDURE — 85025 COMPLETE CBC W/AUTO DIFF WBC: CPT | Performed by: EMERGENCY MEDICINE

## 2021-05-21 PROCEDURE — 74177 CT ABD & PELVIS W/CONTRAST: CPT | Performed by: EMERGENCY MEDICINE

## 2021-05-21 RX ORDER — CEPHALEXIN 500 MG/1
500 CAPSULE ORAL 2 TIMES DAILY
Qty: 20 CAPSULE | Refills: 0 | Status: SHIPPED | OUTPATIENT
Start: 2021-05-21 | End: 2021-05-31

## 2021-05-22 NOTE — ED INITIAL ASSESSMENT (HPI)
Left lower quadrant pain x 30 minutes.  4/10 pain  Normal bm today  Denies nausea/vomiting/diarrhea/fevers

## 2021-05-22 NOTE — ED PROVIDER NOTES
Patient Seen in: [facility] AND [facility] Emergency Department      History   Patient presents with:  Abdomen/Flank Pain    Stated Complaint: llq pain     HPI/Subjective:   HPI  70-year-old female with hypertension, presents for evaluation of left-sided abdomin Physical Exam  Vitals and nursing note reviewed. Constitutional:       Appearance: She is well-developed. HENT:      Head: Normocephalic and atraumatic. Cardiovascular:      Rate and Rhythm: Normal rate and regular rhythm.       Heart sounds: Abnormality         Status                     ---------                               -----------         ------                     CBC W/ DIFFERENTIAL[[id number]]          Abnormal            Final result                 Please view results for thes blood pressure. Medications Prescribed:  Discharge Medication List as of 5/21/2021 10:32 PM    START taking these medications    cephALEXin 500 MG Oral Cap  Take 1 capsule (500 mg total) by mouth 2 (two) times daily for 10 days. , Normal, Disp-20 capsu

## 2021-06-02 ENCOUNTER — OFFICE VISIT (OUTPATIENT)
Dept: INTERNAL MEDICINE CLINIC | Facility: CLINIC | Age: 76
End: 2021-06-02
Payer: MEDICARE

## 2021-06-02 VITALS
DIASTOLIC BLOOD PRESSURE: 100 MMHG | BODY MASS INDEX: 27.19 KG/M2 | SYSTOLIC BLOOD PRESSURE: 162 MMHG | HEIGHT: 61 IN | OXYGEN SATURATION: 98 % | WEIGHT: 144 LBS | HEART RATE: 100 BPM | TEMPERATURE: 97 F

## 2021-06-02 DIAGNOSIS — M46.1 SACROILIITIS (HCC): ICD-10-CM

## 2021-06-02 DIAGNOSIS — M25.561 ACUTE PAIN OF RIGHT KNEE: ICD-10-CM

## 2021-06-02 DIAGNOSIS — E78.00 HYPERCHOLESTEROLEMIA: ICD-10-CM

## 2021-06-02 DIAGNOSIS — R10.32 LEFT LOWER QUADRANT ABDOMINAL PAIN: Primary | ICD-10-CM

## 2021-06-02 PROCEDURE — 99214 OFFICE O/P EST MOD 30 MIN: CPT | Performed by: INTERNAL MEDICINE

## 2021-06-02 RX ORDER — MELOXICAM 15 MG/1
15 TABLET ORAL DAILY
COMMUNITY
Start: 2021-04-15 | End: 2021-12-13

## 2021-06-02 NOTE — PROGRESS NOTES
Renzo Tam is a 76year old female. HPI:   She had sudden onset of LLQ pain about 2 weeks ago, no NVD, went to ED, CT abdo suggestive of pyelonephritis ( mild left perinephric stranding) but urine culture negative. U/a was suggestive of UTI.   Pain jennifer Yes      Alcohol/week: 0.0 standard drinks      Comment: rarely.  3 drinks a year    Drug use: No       REVIEW OF SYSTEMS:   GENERAL HEALTH: feels well otherwise  SKIN: denies any unusual skin lesions or rashes  RESPIRATORY: denies shortness of breath with understanding of these issues and agrees to the plan. The patient is asked to return in July or sooner as needed.

## 2021-06-05 ENCOUNTER — TELEPHONE (OUTPATIENT)
Dept: INTERNAL MEDICINE CLINIC | Facility: CLINIC | Age: 76
End: 2021-06-05

## 2021-06-07 RX ORDER — ENALAPRIL MALEATE 5 MG/1
TABLET ORAL
Qty: 90 TABLET | Refills: 3 | Status: SHIPPED | OUTPATIENT
Start: 2021-06-07 | End: 2022-06-02

## 2021-06-17 ENCOUNTER — LAB ENCOUNTER (OUTPATIENT)
Dept: LAB | Age: 76
End: 2021-06-17
Attending: INTERNAL MEDICINE
Payer: MEDICARE

## 2021-06-17 ENCOUNTER — TELEPHONE (OUTPATIENT)
Dept: INTERNAL MEDICINE CLINIC | Facility: CLINIC | Age: 76
End: 2021-06-17

## 2021-06-17 DIAGNOSIS — R10.32 LEFT LOWER QUADRANT ABDOMINAL PAIN: ICD-10-CM

## 2021-06-17 DIAGNOSIS — E78.00 HYPERCHOLESTEROLEMIA: ICD-10-CM

## 2021-06-17 PROCEDURE — 81015 MICROSCOPIC EXAM OF URINE: CPT

## 2021-06-17 PROCEDURE — 84443 ASSAY THYROID STIM HORMONE: CPT

## 2021-06-17 PROCEDURE — 87086 URINE CULTURE/COLONY COUNT: CPT

## 2021-06-17 PROCEDURE — 36415 COLL VENOUS BLD VENIPUNCTURE: CPT

## 2021-06-17 PROCEDURE — 80061 LIPID PANEL: CPT

## 2021-06-17 PROCEDURE — 81001 URINALYSIS AUTO W/SCOPE: CPT

## 2021-06-17 NOTE — TELEPHONE ENCOUNTER
To on-call MD to please advise on UA results from 6/17/21. Urine culture pending.   6-month f/u appt scheduled for Monday 6/21 with PCP---pt denies any UTI symptoms today: denies urgency or frequency, dysuria, fever or chills, hematuria, flank pain, malodor

## 2021-06-18 NOTE — TELEPHONE ENCOUNTER
Noted. Patient's urinalysis has 10-20 WBCs noted. Patient's urine culture is pending. Patient currently is asymptomatic. At this point I would wait for the final urine culture before starting antibiotics.   I will forward this message to Dr. Shagufta Murphy as an

## 2021-06-18 NOTE — TELEPHONE ENCOUNTER
I spoke with patient and relayed Dr. Holli Hendricks message. She verbalized understanding. She is not having any concerning symptoms. Invited patient to call back with any questions or concerns.

## 2021-06-21 ENCOUNTER — OFFICE VISIT (OUTPATIENT)
Dept: INTERNAL MEDICINE CLINIC | Facility: CLINIC | Age: 76
End: 2021-06-21
Payer: MEDICARE

## 2021-06-21 VITALS
HEART RATE: 67 BPM | HEIGHT: 61 IN | BODY MASS INDEX: 26.93 KG/M2 | WEIGHT: 142.63 LBS | TEMPERATURE: 99 F | DIASTOLIC BLOOD PRESSURE: 104 MMHG | SYSTOLIC BLOOD PRESSURE: 160 MMHG | OXYGEN SATURATION: 97 %

## 2021-06-21 DIAGNOSIS — E78.00 HYPERCHOLESTEROLEMIA: ICD-10-CM

## 2021-06-21 DIAGNOSIS — R10.32 LEFT LOWER QUADRANT ABDOMINAL PAIN: ICD-10-CM

## 2021-06-21 DIAGNOSIS — Z00.00 ENCOUNTER FOR ANNUAL WELLNESS EXAM IN MEDICARE PATIENT: Primary | ICD-10-CM

## 2021-06-21 DIAGNOSIS — M25.561 ACUTE PAIN OF RIGHT KNEE: ICD-10-CM

## 2021-06-21 DIAGNOSIS — I10 ESSENTIAL HYPERTENSION: ICD-10-CM

## 2021-06-21 PROCEDURE — G0439 PPPS, SUBSEQ VISIT: HCPCS | Performed by: INTERNAL MEDICINE

## 2021-06-21 PROCEDURE — 20611 DRAIN/INJ JOINT/BURSA W/US: CPT | Performed by: INTERNAL MEDICINE

## 2021-06-21 RX ORDER — TRIAMCINOLONE ACETONIDE 40 MG/ML
40 INJECTION, SUSPENSION INTRA-ARTICULAR; INTRAMUSCULAR ONCE
Status: COMPLETED | OUTPATIENT
Start: 2021-06-21 | End: 2021-06-21

## 2021-06-21 NOTE — PROGRESS NOTES
Stefano Esposito is a 76year old female. HPI:   She is here for annual [facility] wellness exam.    Generally she is doing well. No recurrence of pain in the LLQ and no  sx - CT suggestive of pyelo but urine culture negative.      Continued pain in the right k Drug use: No       REVIEW OF SYSTEMS:   GENERAL HEALTH: feels well otherwise  SKIN: denies any unusual skin lesions or rashes  RESPIRATORY: denies shortness of breath with exertion  CARDIOVASCULAR: denies chest pain on exertion  GI: denies abdominal pain a without help    Eating: Able without help    Driving: Able without help    Preparing your meals: Able without help    Managing money/bills: Able without help    Taking medications as prescribed: Able without help    Are you able to afford your medications? I cannot hear well and fear I will reply improperly: No   Family members and friends have told me they think I may have hearing loss: No           Visual Acuity                   Cognitive Assessment     What day of the week is this?: Correct    What month Mammogram  Annually No recommendations at this time Update Health Maintenance if applicable   Immunizations      Influenza No orders found for this or any previous visit.  Update Immunization Activity if applicable    Pneumococcal Orders placed or performed up-to-date with colonoscopy    2. Left lower quadrant abdominal pain  No recurrence and further evaluation if symptoms do recur    3. Acute pain of right knee  Observe response to steroid injection.   Repeat x-rays of the knees which have not been done in a

## 2021-06-22 ENCOUNTER — TELEPHONE (OUTPATIENT)
Dept: INTERNAL MEDICINE CLINIC | Facility: CLINIC | Age: 76
End: 2021-06-22

## 2021-06-22 DIAGNOSIS — M17.0 OSTEOARTHRITIS OF BOTH KNEES, UNSPECIFIED OSTEOARTHRITIS TYPE: Primary | ICD-10-CM

## 2021-06-22 DIAGNOSIS — M25.561 PAIN IN BOTH KNEES, UNSPECIFIED CHRONICITY: ICD-10-CM

## 2021-06-22 DIAGNOSIS — M25.562 PAIN IN BOTH KNEES, UNSPECIFIED CHRONICITY: ICD-10-CM

## 2021-06-22 NOTE — TELEPHONE ENCOUNTER
Please call her and tell her that I want to get x-rays of both knees.   Please order x-rays of both knees with weightbearing, diagnosis osteoarthritis and pain

## 2021-06-23 NOTE — TELEPHONE ENCOUNTER
Called and Relayed MD's message to patient---verbalized understanding  She will have knee xrays done

## 2021-06-24 ENCOUNTER — HOSPITAL ENCOUNTER (OUTPATIENT)
Dept: GENERAL RADIOLOGY | Facility: HOSPITAL | Age: 76
Discharge: HOME | End: 2021-06-24
Attending: INTERNAL MEDICINE
Payer: MEDICARE

## 2021-06-24 DIAGNOSIS — M17.0 OSTEOARTHRITIS OF BOTH KNEES, UNSPECIFIED OSTEOARTHRITIS TYPE: ICD-10-CM

## 2021-06-24 DIAGNOSIS — M25.561 PAIN IN BOTH KNEES, UNSPECIFIED CHRONICITY: ICD-10-CM

## 2021-06-24 DIAGNOSIS — M25.562 PAIN IN BOTH KNEES, UNSPECIFIED CHRONICITY: ICD-10-CM

## 2021-06-24 PROCEDURE — 73564 X-RAY EXAM KNEE 4 OR MORE: CPT | Performed by: INTERNAL MEDICINE

## 2021-07-26 ENCOUNTER — HOSPITAL ENCOUNTER (OUTPATIENT)
Dept: CT IMAGING | Facility: HOSPITAL | Age: 76
Discharge: HOME | End: 2021-07-26
Attending: INTERNAL MEDICINE

## 2021-07-26 DIAGNOSIS — E78.00 HYPERCHOLESTEROLEMIA: ICD-10-CM

## 2021-08-03 ENCOUNTER — TELEPHONE (OUTPATIENT)
Dept: INTERNAL MEDICINE CLINIC | Facility: CLINIC | Age: 76
End: 2021-08-03

## 2021-11-08 ENCOUNTER — IMMUNIZATION (OUTPATIENT)
Dept: LAB | Facility: HOSPITAL | Age: 76
End: 2021-11-08
Attending: EMERGENCY MEDICINE
Payer: MEDICARE

## 2021-11-08 ENCOUNTER — OFFICE VISIT (OUTPATIENT)
Dept: INTERNAL MEDICINE CLINIC | Facility: CLINIC | Age: 76
End: 2021-11-08
Payer: MEDICARE

## 2021-11-08 ENCOUNTER — HOSPITAL ENCOUNTER (OUTPATIENT)
Dept: CT IMAGING | Facility: HOSPITAL | Age: 76
Discharge: HOME | End: 2021-11-08
Attending: INTERNAL MEDICINE
Payer: MEDICARE

## 2021-11-08 ENCOUNTER — LAB ENCOUNTER (OUTPATIENT)
Dept: LAB | Age: 76
End: 2021-11-08
Attending: EMERGENCY MEDICINE
Payer: MEDICARE

## 2021-11-08 VITALS
BODY MASS INDEX: 25.11 KG/M2 | OXYGEN SATURATION: 97 % | WEIGHT: 133 LBS | TEMPERATURE: 99 F | DIASTOLIC BLOOD PRESSURE: 90 MMHG | HEIGHT: 61 IN | HEART RATE: 104 BPM | SYSTOLIC BLOOD PRESSURE: 138 MMHG | RESPIRATION RATE: 18 BRPM

## 2021-11-08 DIAGNOSIS — Z23 NEED FOR VACCINATION: Primary | ICD-10-CM

## 2021-11-08 DIAGNOSIS — R10.32 LEFT LOWER QUADRANT ABDOMINAL PAIN: ICD-10-CM

## 2021-11-08 DIAGNOSIS — I10 ESSENTIAL HYPERTENSION: ICD-10-CM

## 2021-11-08 DIAGNOSIS — R10.2 PELVIC PRESSURE IN FEMALE: ICD-10-CM

## 2021-11-08 DIAGNOSIS — R10.32 LEFT LOWER QUADRANT ABDOMINAL PAIN: Primary | ICD-10-CM

## 2021-11-08 PROCEDURE — 85025 COMPLETE CBC W/AUTO DIFF WBC: CPT

## 2021-11-08 PROCEDURE — 99214 OFFICE O/P EST MOD 30 MIN: CPT | Performed by: INTERNAL MEDICINE

## 2021-11-08 PROCEDURE — 74177 CT ABD & PELVIS W/CONTRAST: CPT | Performed by: INTERNAL MEDICINE

## 2021-11-08 PROCEDURE — 81001 URINALYSIS AUTO W/SCOPE: CPT

## 2021-11-08 PROCEDURE — 36415 COLL VENOUS BLD VENIPUNCTURE: CPT

## 2021-11-08 PROCEDURE — 82565 ASSAY OF CREATININE: CPT

## 2021-11-08 PROCEDURE — 80053 COMPREHEN METABOLIC PANEL: CPT

## 2021-11-08 PROCEDURE — 0064A SARSCOV2 VAC 50MCG/0.25ML IM: CPT

## 2021-11-08 PROCEDURE — 87086 URINE CULTURE/COLONY COUNT: CPT

## 2021-11-08 NOTE — PROGRESS NOTES
Mikey Edward is a 68year old female. HPI:   She had UTI about 5 mo months ago  - she presented with left sided abdo pain and CT suggestive of pyelonephritis but urine culture negative - she was given Rocephin in ED and sent home and she felt better.  Edwin Smoker      Smokeless tobacco: Never Used    Vaping Use      Vaping Use: Never used    Alcohol use: Yes      Alcohol/week: 0.0 standard drinks      Comment: rarely.  3 drinks a year    Drug use: No       REVIEW OF SYSTEMS:   GENERAL HEALTH: feels well other

## 2021-11-09 ENCOUNTER — TELEPHONE (OUTPATIENT)
Dept: INTERNAL MEDICINE CLINIC | Facility: CLINIC | Age: 76
End: 2021-11-09

## 2021-11-09 RX ORDER — CIPROFLOXACIN 500 MG/1
500 TABLET, FILM COATED ORAL 2 TIMES DAILY
Qty: 14 TABLET | Refills: 0 | Status: SHIPPED | OUTPATIENT
Start: 2021-11-09 | End: 2021-12-13 | Stop reason: ALTCHOICE

## 2021-11-09 RX ORDER — PHENAZOPYRIDINE HYDROCHLORIDE 200 MG/1
200 TABLET, FILM COATED ORAL 3 TIMES DAILY PRN
Qty: 12 TABLET | Refills: 0 | Status: SHIPPED | OUTPATIENT
Start: 2021-11-09 | End: 2021-12-13 | Stop reason: ALTCHOICE

## 2021-11-09 NOTE — TELEPHONE ENCOUNTER
Patient is calling she would like to talk to Dr Mariah Engel regarding results of CT Abdomin on 11/8  She said she is very uncomfortable and would like to know the next step    Please call cell [phone]

## 2021-11-09 NOTE — TELEPHONE ENCOUNTER
I discussed results of the CT with Kal Nicole which showed only thickening of the bladder wall suggestive of cystitis. Her labs are normal with the exception of her urinalysis which is highly suggestive of a UTI and we are awaiting cultures.     She continues

## 2021-11-12 ENCOUNTER — TELEPHONE (OUTPATIENT)
Dept: INTERNAL MEDICINE CLINIC | Facility: CLINIC | Age: 76
End: 2021-11-12

## 2021-11-12 NOTE — TELEPHONE ENCOUNTER
Labs including CBC, CMP, lipids, thyroid all normal or acceptable - continue same meds     Urine culture negative.

## 2021-11-15 ENCOUNTER — TELEPHONE (OUTPATIENT)
Dept: INTERNAL MEDICINE CLINIC | Facility: CLINIC | Age: 76
End: 2021-11-15

## 2021-11-15 NOTE — TELEPHONE ENCOUNTER
How is she doing? If she is still having pelvic symptoms I think I am going to have her see urology rather than  with this point.   Refer to Martin Bowman or Dr Melida Contreras

## 2021-11-15 NOTE — TELEPHONE ENCOUNTER
FYI, Dr Bismark Garber, patient is doing really well with no further symptoms. If symptoms occur in future she will call office to get the uro physician names and info.

## 2021-12-13 ENCOUNTER — OFFICE VISIT (OUTPATIENT)
Dept: INTERNAL MEDICINE CLINIC | Facility: CLINIC | Age: 76
End: 2021-12-13
Payer: MEDICARE

## 2021-12-13 VITALS
SYSTOLIC BLOOD PRESSURE: 180 MMHG | WEIGHT: 138.63 LBS | BODY MASS INDEX: 26.17 KG/M2 | TEMPERATURE: 98 F | OXYGEN SATURATION: 100 % | HEIGHT: 61 IN | DIASTOLIC BLOOD PRESSURE: 96 MMHG | HEART RATE: 94 BPM

## 2021-12-13 DIAGNOSIS — E78.00 HYPERCHOLESTEROLEMIA: ICD-10-CM

## 2021-12-13 DIAGNOSIS — I10 ESSENTIAL HYPERTENSION: ICD-10-CM

## 2021-12-13 DIAGNOSIS — M25.561 ACUTE PAIN OF RIGHT KNEE: ICD-10-CM

## 2021-12-13 DIAGNOSIS — M17.11 OSTEOARTHRITIS OF RIGHT KNEE, UNSPECIFIED OSTEOARTHRITIS TYPE: Primary | ICD-10-CM

## 2021-12-13 DIAGNOSIS — M85.859 OSTEOPENIA OF THIGH, UNSPECIFIED LATERALITY: ICD-10-CM

## 2021-12-13 PROCEDURE — 99214 OFFICE O/P EST MOD 30 MIN: CPT | Performed by: INTERNAL MEDICINE

## 2021-12-13 PROCEDURE — 20610 DRAIN/INJ JOINT/BURSA W/O US: CPT | Performed by: INTERNAL MEDICINE

## 2021-12-13 RX ORDER — TRIAMCINOLONE ACETONIDE 40 MG/ML
40 INJECTION, SUSPENSION INTRA-ARTICULAR; INTRAMUSCULAR ONCE
Status: COMPLETED | OUTPATIENT
Start: 2021-12-13 | End: 2021-12-13

## 2021-12-13 RX ADMIN — TRIAMCINOLONE ACETONIDE 40 MG: 40 INJECTION, SUSPENSION INTRA-ARTICULAR; INTRAMUSCULAR at 11:50:00

## 2021-12-13 NOTE — PROGRESS NOTES
Karlene Bryson is a 68year old female. HPI:   She is generally doing well. Worsening pain in the Rt knee - I gave her a steroid injection about 6 mo ago with marked relief. No further  issues.      She has osteopenia  -  On OsCal      SR: no ches heartburn  NEURO: denies headaches    EXAM:   BP (!) 180/96   Pulse 94   Temp 98.2 °F (36.8 °C)   Ht 5' 1\" (1.549 m)   Wt 138 lb 9.6 oz (62.9 kg)   SpO2 100%   BMI 26.19 kg/m²   GENERAL: well developed, well nourished,in no apparent distress  SKIN: no carrington

## 2022-04-06 ENCOUNTER — IMMUNIZATION (OUTPATIENT)
Dept: LAB | Age: 77
End: 2022-04-06
Attending: EMERGENCY MEDICINE
Payer: MEDICARE

## 2022-04-06 DIAGNOSIS — Z23 NEED FOR VACCINATION: Primary | ICD-10-CM

## 2022-04-06 PROCEDURE — 0064A SARSCOV2 VAC 50MCG/0.25ML IM: CPT

## 2022-06-02 RX ORDER — ENALAPRIL MALEATE 5 MG/1
TABLET ORAL
Qty: 90 TABLET | Refills: 0 | Status: SHIPPED | OUTPATIENT
Start: 2022-06-02

## 2022-06-13 ENCOUNTER — LAB ENCOUNTER (OUTPATIENT)
Dept: LAB | Age: 77
End: 2022-06-13
Attending: INTERNAL MEDICINE
Payer: MEDICARE

## 2022-06-13 ENCOUNTER — OFFICE VISIT (OUTPATIENT)
Dept: INTERNAL MEDICINE CLINIC | Facility: CLINIC | Age: 77
End: 2022-06-13
Payer: MEDICARE

## 2022-06-13 VITALS
BODY MASS INDEX: 26.81 KG/M2 | HEART RATE: 100 BPM | SYSTOLIC BLOOD PRESSURE: 142 MMHG | WEIGHT: 142 LBS | HEIGHT: 61 IN | DIASTOLIC BLOOD PRESSURE: 106 MMHG | TEMPERATURE: 98 F | OXYGEN SATURATION: 98 %

## 2022-06-13 DIAGNOSIS — E78.00 HYPERCHOLESTEROLEMIA: ICD-10-CM

## 2022-06-13 DIAGNOSIS — I10 ESSENTIAL HYPERTENSION: ICD-10-CM

## 2022-06-13 DIAGNOSIS — Z78.0 POSTMENOPAUSAL: ICD-10-CM

## 2022-06-13 DIAGNOSIS — M25.561 ACUTE PAIN OF RIGHT KNEE: ICD-10-CM

## 2022-06-13 DIAGNOSIS — R52 PAIN: Primary | ICD-10-CM

## 2022-06-13 DIAGNOSIS — Z12.31 ENCOUNTER FOR SCREENING MAMMOGRAM FOR HIGH-RISK PATIENT: ICD-10-CM

## 2022-06-13 LAB
ALBUMIN SERPL-MCNC: 3.9 G/DL (ref 3.4–5)
ALBUMIN/GLOB SERPL: 1.1 {RATIO} (ref 1–2)
ALP LIVER SERPL-CCNC: 117 U/L
ALT SERPL-CCNC: 27 U/L
ANION GAP SERPL CALC-SCNC: 10 MMOL/L (ref 0–18)
AST SERPL-CCNC: 18 U/L (ref 15–37)
BASOPHILS # BLD AUTO: 0.08 X10(3) UL (ref 0–0.2)
BASOPHILS NFR BLD AUTO: 0.9 %
BILIRUB SERPL-MCNC: 0.8 MG/DL (ref 0.1–2)
BILIRUB UR QL: NEGATIVE
BUN BLD-MCNC: 16 MG/DL (ref 7–18)
BUN/CREAT SERPL: 20 (ref 10–20)
CALCIUM BLD-MCNC: 10.6 MG/DL (ref 8.5–10.1)
CHLORIDE SERPL-SCNC: 105 MMOL/L (ref 98–112)
CHOLEST SERPL-MCNC: 234 MG/DL (ref ?–200)
CLARITY UR: CLEAR
CO2 SERPL-SCNC: 26 MMOL/L (ref 21–32)
COLOR UR: YELLOW
CREAT BLD-MCNC: 0.8 MG/DL
DEPRECATED RDW RBC AUTO: 47.3 FL (ref 35.1–46.3)
EOSINOPHIL # BLD AUTO: 0.26 X10(3) UL (ref 0–0.7)
EOSINOPHIL NFR BLD AUTO: 2.9 %
ERYTHROCYTE [DISTWIDTH] IN BLOOD BY AUTOMATED COUNT: 13.7 % (ref 11–15)
FASTING PATIENT LIPID ANSWER: YES
FASTING STATUS PATIENT QL REPORTED: YES
GLOBULIN PLAS-MCNC: 3.5 G/DL (ref 2.8–4.4)
GLUCOSE BLD-MCNC: 95 MG/DL (ref 70–99)
GLUCOSE UR-MCNC: NEGATIVE MG/DL
HCT VFR BLD AUTO: 44 %
HDLC SERPL-MCNC: 62 MG/DL (ref 40–59)
HGB BLD-MCNC: 13.9 G/DL
HGB UR QL STRIP.AUTO: NEGATIVE
IMM GRANULOCYTES # BLD AUTO: 0.03 X10(3) UL (ref 0–1)
IMM GRANULOCYTES NFR BLD: 0.3 %
KETONES UR-MCNC: NEGATIVE MG/DL
LDLC SERPL CALC-MCNC: 137 MG/DL (ref ?–100)
LYMPHOCYTES # BLD AUTO: 2.37 X10(3) UL (ref 1–4)
LYMPHOCYTES NFR BLD AUTO: 26.3 %
MCH RBC QN AUTO: 29 PG (ref 26–34)
MCHC RBC AUTO-ENTMCNC: 31.6 G/DL (ref 31–37)
MCV RBC AUTO: 91.9 FL
MONOCYTES # BLD AUTO: 0.84 X10(3) UL (ref 0.1–1)
MONOCYTES NFR BLD AUTO: 9.3 %
NEUTROPHILS # BLD AUTO: 5.44 X10 (3) UL (ref 1.5–7.7)
NEUTROPHILS # BLD AUTO: 5.44 X10(3) UL (ref 1.5–7.7)
NEUTROPHILS NFR BLD AUTO: 60.3 %
NITRITE UR QL STRIP.AUTO: NEGATIVE
NONHDLC SERPL-MCNC: 172 MG/DL (ref ?–130)
OSMOLALITY SERPL CALC.SUM OF ELEC: 293 MOSM/KG (ref 275–295)
PH UR: 6.5 [PH] (ref 5–8)
PLATELET # BLD AUTO: 358 10(3)UL (ref 150–450)
POTASSIUM SERPL-SCNC: 4 MMOL/L (ref 3.5–5.1)
PROT SERPL-MCNC: 7.4 G/DL (ref 6.4–8.2)
PROT UR-MCNC: NEGATIVE MG/DL
RBC # BLD AUTO: 4.79 X10(6)UL
SODIUM SERPL-SCNC: 141 MMOL/L (ref 136–145)
SP GR UR STRIP: 1.02 (ref 1–1.03)
TRIGL SERPL-MCNC: 200 MG/DL (ref 30–149)
TSI SER-ACNC: 1.74 MIU/ML (ref 0.36–3.74)
UROBILINOGEN UR STRIP-ACNC: 0.2
VLDLC SERPL CALC-MCNC: 37 MG/DL (ref 0–30)
WBC # BLD AUTO: 9 X10(3) UL (ref 4–11)

## 2022-06-13 PROCEDURE — 85025 COMPLETE CBC W/AUTO DIFF WBC: CPT

## 2022-06-13 PROCEDURE — 87086 URINE CULTURE/COLONY COUNT: CPT | Performed by: INTERNAL MEDICINE

## 2022-06-13 PROCEDURE — 80061 LIPID PANEL: CPT

## 2022-06-13 PROCEDURE — 36415 COLL VENOUS BLD VENIPUNCTURE: CPT

## 2022-06-13 PROCEDURE — 80053 COMPREHEN METABOLIC PANEL: CPT

## 2022-06-13 PROCEDURE — 84443 ASSAY THYROID STIM HORMONE: CPT

## 2022-06-13 PROCEDURE — 81001 URINALYSIS AUTO W/SCOPE: CPT | Performed by: INTERNAL MEDICINE

## 2022-06-13 RX ORDER — TRIAMCINOLONE ACETONIDE 40 MG/ML
40 INJECTION, SUSPENSION INTRA-ARTICULAR; INTRAMUSCULAR ONCE
Status: COMPLETED | OUTPATIENT
Start: 2022-06-13 | End: 2022-06-13

## 2022-06-18 ENCOUNTER — TELEPHONE (OUTPATIENT)
Dept: INTERNAL MEDICINE CLINIC | Facility: CLINIC | Age: 77
End: 2022-06-18

## 2022-06-18 DIAGNOSIS — E83.52 HYPERCALCEMIA: Primary | ICD-10-CM

## 2022-06-18 NOTE — TELEPHONE ENCOUNTER
Labs including CBC, CMP, lipids, thyroid all normal - continue same meds     Calcium a little elevated - I want to repeat serum calcium, phosphate and PTH-intact at her convenience.

## 2022-06-20 NOTE — TELEPHONE ENCOUNTER
Spoke with patient. Relayed MD message. Pt verbalized understanding. To Dr. Emanuel Little:  Lab orders pended for review/diagnosis.

## 2022-06-24 ENCOUNTER — LAB ENCOUNTER (OUTPATIENT)
Dept: LAB | Age: 77
End: 2022-06-24
Attending: INTERNAL MEDICINE
Payer: MEDICARE

## 2022-06-24 DIAGNOSIS — E83.52 HYPERCALCEMIA: ICD-10-CM

## 2022-06-24 LAB
CALCIUM BLD-MCNC: 9.7 MG/DL (ref 8.5–10.1)
PHOSPHATE SERPL-MCNC: 3.6 MG/DL (ref 2.5–4.9)
PTH-INTACT SERPL-MCNC: 25.5 PG/ML (ref 18.5–88)

## 2022-06-24 PROCEDURE — 36415 COLL VENOUS BLD VENIPUNCTURE: CPT

## 2022-06-24 PROCEDURE — 83970 ASSAY OF PARATHORMONE: CPT

## 2022-06-24 PROCEDURE — 84100 ASSAY OF PHOSPHORUS: CPT

## 2022-06-24 PROCEDURE — 82310 ASSAY OF CALCIUM: CPT

## 2022-07-03 ENCOUNTER — TELEPHONE (OUTPATIENT)
Dept: INTERNAL MEDICINE CLINIC | Facility: CLINIC | Age: 77
End: 2022-07-03

## 2022-07-03 NOTE — TELEPHONE ENCOUNTER
Repeat labs are normal - calcium, phosphorus and PTH level. No explanation for prior calcium elevation but I will repeat labs in 6 mo. No worries .

## 2022-07-08 ENCOUNTER — TELEPHONE (OUTPATIENT)
Dept: INTERNAL MEDICINE CLINIC | Facility: CLINIC | Age: 77
End: 2022-07-08

## 2022-07-08 ENCOUNTER — HOSPITAL ENCOUNTER (OUTPATIENT)
Dept: MRI IMAGING | Facility: HOSPITAL | Age: 77
Discharge: HOME | End: 2022-07-08
Attending: INTERNAL MEDICINE
Payer: MEDICARE

## 2022-07-08 DIAGNOSIS — R52 PAIN: ICD-10-CM

## 2022-07-08 PROCEDURE — 73721 MRI JNT OF LWR EXTRE W/O DYE: CPT | Performed by: INTERNAL MEDICINE

## 2022-07-08 NOTE — TELEPHONE ENCOUNTER
To Dr Linette Rodarte can you please review right knee MRI result from today  Amarilis Rodriguez to wait for Dr Love Salinas on Monday?  Thank you

## 2022-07-08 NOTE — TELEPHONE ENCOUNTER
MRI results reviewed. I think okay to wait for Dr. Ashley Jacobson. I printed copy. In outbox. Please put on Dr. Sandi Villarreal desk for his to review Monday. ( Neena Short.  [address] )

## 2022-07-11 NOTE — TELEPHONE ENCOUNTER
I did discuss with Sunil Montez the results of the MRI of her knee and went into the details described in the impression. At this time, she feels she is doing reasonably well and is not interested in seeing an orthopedic surgeon or contemplating total knee replacement at this time. I also offered her orthopedic referral for a possible gel injection in the knee and she declines at this time. She would prefer to continue with every 6 month steroid injections which she feels gives her excellent relief. She will call if any new issues develop.

## 2022-08-29 ENCOUNTER — HOSPITAL ENCOUNTER (OUTPATIENT)
Dept: BONE DENSITY | Facility: HOSPITAL | Age: 77
Discharge: HOME | End: 2022-08-29
Attending: INTERNAL MEDICINE
Payer: MEDICARE

## 2022-08-29 ENCOUNTER — HOSPITAL ENCOUNTER (OUTPATIENT)
Dept: MAMMOGRAPHY | Facility: HOSPITAL | Age: 77
Discharge: HOME | End: 2022-08-29
Attending: INTERNAL MEDICINE
Payer: MEDICARE

## 2022-08-29 DIAGNOSIS — Z12.31 ENCOUNTER FOR SCREENING MAMMOGRAM FOR HIGH-RISK PATIENT: ICD-10-CM

## 2022-08-29 DIAGNOSIS — Z78.0 POSTMENOPAUSAL: ICD-10-CM

## 2022-08-29 PROCEDURE — 77080 DXA BONE DENSITY AXIAL: CPT | Performed by: INTERNAL MEDICINE

## 2022-08-29 PROCEDURE — 77063 BREAST TOMOSYNTHESIS BI: CPT | Performed by: INTERNAL MEDICINE

## 2022-08-29 PROCEDURE — 77067 SCR MAMMO BI INCL CAD: CPT | Performed by: INTERNAL MEDICINE

## 2022-08-31 RX ORDER — ENALAPRIL MALEATE 10 MG/1
10 TABLET ORAL DAILY
Qty: 90 TABLET | Refills: 3 | Status: SHIPPED | OUTPATIENT
Start: 2022-08-31

## 2022-08-31 NOTE — TELEPHONE ENCOUNTER
Spoke to patient and relayed MD message. Enalapril dose increase from 5 mg to 10 mg sent to [facility] [place] at [facility]. Instructed to continue monitoring BP and call the office with results in 1 week. Call sooner if she is not noticing any difference. Pt verbalized understanding and agrees with plan.

## 2022-08-31 NOTE — TELEPHONE ENCOUNTER
Increase enalapril to 10 mg daily - send in # 90, refill x 3. She can take two-5mg tabs daily until she gets higher dose.

## 2022-08-31 NOTE — TELEPHONE ENCOUNTER
Patient is calling back to check on status of refill  Patient will be out of medication on Sunday and she is concerned because of the holiday

## 2022-08-31 NOTE — TELEPHONE ENCOUNTER
Please advise - patient called back with some BP readings -on Enalapril 5 mg    8/19/22 /90  8/22/22/ 149/88  8/24/22 /100   All taken in the morning  8/27/22 127/87 taken in afternoon     To DR. JAIN

## 2022-08-31 NOTE — TELEPHONE ENCOUNTER
Per Dr. Annika Angulo note 6/13/22: \"1. Essential hypertension  BP is high today, same medication for now. She will check her blood pressure over the next several days and then at least monthly and let me know if it is not at goal in the range of 130/80 or less on average\"    Called pt to ask about how her BP have been ranging - spoke to spouse Marie Peña who stated he will ask pt to call us back. Await call back.

## 2022-09-05 ENCOUNTER — TELEPHONE (OUTPATIENT)
Dept: INTERNAL MEDICINE CLINIC | Facility: CLINIC | Age: 77
End: 2022-09-05

## 2022-09-05 NOTE — TELEPHONE ENCOUNTER
Mammogram normal.     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 In addition to calcium and Vit D I would like her to consider starting Fosamax 70 mg, # 12, one per WEEK, refil x 3 -  This builds up bones, needs dental exam prior since it can case jaw bone problems rarely.     Also get Vit D level in blood

## 2022-09-06 NOTE — TELEPHONE ENCOUNTER
To Dr Hortencia Sylvester  Please advise      Please clarify how much Vit D and Calcium you want her to take daily   Did you want a VIitamin D level prior to starting Vitamin D  Supplements.

## 2022-09-06 NOTE — TELEPHONE ENCOUNTER
Jaun, according to the med module and she is on calcium and vitamin D (Os-Mihir)-this should be taken twice daily. If she is not taking this medication then she should buy this and start it.

## 2022-09-08 NOTE — TELEPHONE ENCOUNTER
Tell Jess Marin I was not surprised by her decision on the Fosamax!     I would like to get her BP a little lower - increase lisinopril to 10 mg bid or lisinopril 20 mg one daily - please send in enough for 3 mo of whatever she chooses with 3 refills

## 2022-09-08 NOTE — TELEPHONE ENCOUNTER
Spoke to patient and relayed MD message. Patient verbalized understanding. Patient requested to hold off on taking Fosamax. She will call if she would like this medication prescribed.      Patient reported her BP per Dr. Moe Devine request since her enalapril was increased to 10mg 8/31.     9/2 143/86   9/3 141/77   9/4 115/75  9/5 139/81   9/7 139/80  9/8 137/81

## 2022-09-08 NOTE — TELEPHONE ENCOUNTER
Spoke to pt regarding MD message below. She reports she just picked up a 90 day supply of enalapril and would like to complete that refill before receiving another one. She wishes to take 2 tabs in the AM to equal 20mg in AM. She will call when refill needed.

## 2022-09-13 ENCOUNTER — IMMUNIZATION (OUTPATIENT)
Dept: LAB | Age: 77
End: 2022-09-13
Attending: EMERGENCY MEDICINE
Payer: MEDICARE

## 2022-09-13 DIAGNOSIS — Z23 NEED FOR VACCINATION: Primary | ICD-10-CM

## 2022-09-13 PROCEDURE — 0134A SARSCOV2 VAC BVL 50MCG/0.5ML: CPT

## 2022-10-14 ENCOUNTER — TELEPHONE (OUTPATIENT)
Dept: INTERNAL MEDICINE CLINIC | Facility: CLINIC | Age: 77
End: 2022-10-14

## 2022-10-14 NOTE — TELEPHONE ENCOUNTER
Patient is calling to request a refill on her enalapril 10 MG Oral Tab. Patient was instructed to take 20mg daily. Patient states she was not given a new script for 20mg tablets and is now almost out of the medication. Per nurse's message on 9/8/2022, \"Spoke to pt regarding MD message below. She reports she just picked up a 90 day supply of enalapril and would like to complete that refill before receiving another one. She wishes to take 2 tabs in the AM to equal 20mg in AM. She will call when refill needed. \"    Ph # [phone]  in [place] on [address]

## 2022-10-14 NOTE — TELEPHONE ENCOUNTER
To Lynsey Henning for consult, per Dr. Sunny Sands note 9/8/22:    \"I would like to get her BP a little lower - increase lisinopril to 10 mg bid or lisinopril 20 mg one daily - please send in enough for 3 mo of whatever she chooses with 3 refills\"    I dont see that pt is on lisinopril. She is on enalapril per med profile. Typo? I pended 20mg of enalapril, can you please confirm this.  Thank you

## 2022-10-17 RX ORDER — ENALAPRIL MALEATE 20 MG/1
20 TABLET ORAL DAILY
Qty: 90 TABLET | Refills: 3 | Status: SHIPPED | OUTPATIENT
Start: 2022-10-17

## 2022-10-17 NOTE — TELEPHONE ENCOUNTER
Spoke to pt --- 126-130 /80-86 controlled since increasing dose in Sept.;  She has been taking enalapril 20 mg daily; Denies ADRs;   Will change tablet to 20 mg tablet for once daily dose    To Dr.C Sarai THOMPSON

## 2022-10-17 NOTE — TELEPHONE ENCOUNTER
Patient is calling to check on the status of refill she only has 4 days left of medication.     Tasked to rx low

## 2022-12-12 ENCOUNTER — LAB ENCOUNTER (OUTPATIENT)
Dept: LAB | Age: 77
End: 2022-12-12
Attending: INTERNAL MEDICINE
Payer: MEDICARE

## 2022-12-12 ENCOUNTER — OFFICE VISIT (OUTPATIENT)
Dept: INTERNAL MEDICINE CLINIC | Facility: CLINIC | Age: 77
End: 2022-12-12
Payer: MEDICARE

## 2022-12-12 VITALS
HEART RATE: 108 BPM | WEIGHT: 144.81 LBS | OXYGEN SATURATION: 95 % | SYSTOLIC BLOOD PRESSURE: 160 MMHG | TEMPERATURE: 99 F | BODY MASS INDEX: 27.34 KG/M2 | DIASTOLIC BLOOD PRESSURE: 86 MMHG | HEIGHT: 61 IN

## 2022-12-12 DIAGNOSIS — E78.00 HYPERCHOLESTEREMIA: ICD-10-CM

## 2022-12-12 DIAGNOSIS — E78.00 HYPERCHOLESTEROLEMIA: ICD-10-CM

## 2022-12-12 DIAGNOSIS — I10 ESSENTIAL HYPERTENSION: ICD-10-CM

## 2022-12-12 DIAGNOSIS — M25.561 ACUTE PAIN OF RIGHT KNEE: ICD-10-CM

## 2022-12-12 DIAGNOSIS — Z00.00 ENCOUNTER FOR ANNUAL WELLNESS EXAM IN MEDICARE PATIENT: ICD-10-CM

## 2022-12-12 DIAGNOSIS — E78.00 HYPERCHOLESTEREMIA: Primary | ICD-10-CM

## 2022-12-12 LAB
ALBUMIN SERPL-MCNC: 4 G/DL (ref 3.4–5)
ALBUMIN/GLOB SERPL: 1.1 {RATIO} (ref 1–2)
ALP LIVER SERPL-CCNC: 121 U/L
ALT SERPL-CCNC: 29 U/L
ANION GAP SERPL CALC-SCNC: 7 MMOL/L (ref 0–18)
AST SERPL-CCNC: 18 U/L (ref 15–37)
BASOPHILS # BLD AUTO: 0.13 X10(3) UL (ref 0–0.2)
BASOPHILS NFR BLD AUTO: 1.4 %
BILIRUB SERPL-MCNC: 0.8 MG/DL (ref 0.1–2)
BILIRUB UR QL: NEGATIVE
BUN BLD-MCNC: 19 MG/DL (ref 7–18)
BUN/CREAT SERPL: 26 (ref 10–20)
CALCIUM BLD-MCNC: 9.8 MG/DL (ref 8.5–10.1)
CHLORIDE SERPL-SCNC: 105 MMOL/L (ref 98–112)
CHOLEST SERPL-MCNC: 260 MG/DL (ref ?–200)
CLARITY UR: CLEAR
CO2 SERPL-SCNC: 26 MMOL/L (ref 21–32)
COLOR UR: YELLOW
CREAT BLD-MCNC: 0.73 MG/DL
DEPRECATED RDW RBC AUTO: 45.3 FL (ref 35.1–46.3)
EOSINOPHIL # BLD AUTO: 0.17 X10(3) UL (ref 0–0.7)
EOSINOPHIL NFR BLD AUTO: 1.8 %
ERYTHROCYTE [DISTWIDTH] IN BLOOD BY AUTOMATED COUNT: 13.5 % (ref 11–15)
FASTING PATIENT LIPID ANSWER: NO
FASTING STATUS PATIENT QL REPORTED: NO
GFR SERPLBLD BASED ON 1.73 SQ M-ARVRAT: 85 ML/MIN/1.73M2 (ref 60–?)
GLOBULIN PLAS-MCNC: 3.6 G/DL (ref 2.8–4.4)
GLUCOSE BLD-MCNC: 94 MG/DL (ref 70–99)
GLUCOSE UR-MCNC: NEGATIVE MG/DL
HCT VFR BLD AUTO: 42.9 %
HDLC SERPL-MCNC: 66 MG/DL (ref 40–59)
HGB BLD-MCNC: 14 G/DL
HGB UR QL STRIP.AUTO: NEGATIVE
IMM GRANULOCYTES # BLD AUTO: 0.03 X10(3) UL (ref 0–1)
IMM GRANULOCYTES NFR BLD: 0.3 %
LDLC SERPL CALC-MCNC: 157 MG/DL (ref ?–100)
LYMPHOCYTES # BLD AUTO: 2.87 X10(3) UL (ref 1–4)
LYMPHOCYTES NFR BLD AUTO: 30.6 %
MCH RBC QN AUTO: 29.5 PG (ref 26–34)
MCHC RBC AUTO-ENTMCNC: 32.6 G/DL (ref 31–37)
MCV RBC AUTO: 90.3 FL
MONOCYTES # BLD AUTO: 0.89 X10(3) UL (ref 0.1–1)
MONOCYTES NFR BLD AUTO: 9.5 %
NEUTROPHILS # BLD AUTO: 5.3 X10 (3) UL (ref 1.5–7.7)
NEUTROPHILS # BLD AUTO: 5.3 X10(3) UL (ref 1.5–7.7)
NEUTROPHILS NFR BLD AUTO: 56.4 %
NITRITE UR QL STRIP.AUTO: NEGATIVE
NONHDLC SERPL-MCNC: 194 MG/DL (ref ?–130)
OSMOLALITY SERPL CALC.SUM OF ELEC: 288 MOSM/KG (ref 275–295)
PH UR: 6 [PH] (ref 5–8)
PLATELET # BLD AUTO: 338 10(3)UL (ref 150–450)
POTASSIUM SERPL-SCNC: 4.6 MMOL/L (ref 3.5–5.1)
PROT SERPL-MCNC: 7.6 G/DL (ref 6.4–8.2)
PROT UR-MCNC: NEGATIVE MG/DL
RBC # BLD AUTO: 4.75 X10(6)UL
SODIUM SERPL-SCNC: 138 MMOL/L (ref 136–145)
SP GR UR STRIP: 1.02 (ref 1–1.03)
TRIGL SERPL-MCNC: 207 MG/DL (ref 30–149)
TSI SER-ACNC: 2.2 MIU/ML (ref 0.36–3.74)
UROBILINOGEN UR STRIP-ACNC: 0.2
VLDLC SERPL CALC-MCNC: 40 MG/DL (ref 0–30)
WBC # BLD AUTO: 9.4 X10(3) UL (ref 4–11)

## 2022-12-12 PROCEDURE — 81015 MICROSCOPIC EXAM OF URINE: CPT

## 2022-12-12 PROCEDURE — 93010 ELECTROCARDIOGRAM REPORT: CPT | Performed by: INTERNAL MEDICINE

## 2022-12-12 PROCEDURE — 36415 COLL VENOUS BLD VENIPUNCTURE: CPT

## 2022-12-12 PROCEDURE — 80061 LIPID PANEL: CPT

## 2022-12-12 PROCEDURE — 81001 URINALYSIS AUTO W/SCOPE: CPT

## 2022-12-12 PROCEDURE — 80053 COMPREHEN METABOLIC PANEL: CPT

## 2022-12-12 PROCEDURE — 93005 ELECTROCARDIOGRAM TRACING: CPT

## 2022-12-12 PROCEDURE — 84443 ASSAY THYROID STIM HORMONE: CPT

## 2022-12-12 PROCEDURE — 85025 COMPLETE CBC W/AUTO DIFF WBC: CPT

## 2022-12-13 LAB
ATRIAL RATE: 95 BPM
P AXIS: 36 DEGREES
P-R INTERVAL: 156 MS
Q-T INTERVAL: 342 MS
QRS DURATION: 72 MS
QTC CALCULATION (BEZET): 429 MS
R AXIS: -39 DEGREES
T AXIS: 24 DEGREES
VENTRICULAR RATE: 95 BPM

## 2022-12-17 ENCOUNTER — TELEPHONE (OUTPATIENT)
Dept: INTERNAL MEDICINE CLINIC | Facility: CLINIC | Age: 77
End: 2022-12-17

## 2022-12-17 DIAGNOSIS — E78.00 HYPERCHOLESTEREMIA: Primary | ICD-10-CM

## 2022-12-17 NOTE — TELEPHONE ENCOUNTER
Labs including CBC, CMP, thyroid all normal - continue same meds     Chol high as before - she would qualify for a small dose of Lipitor 10 mg - would she be willing to start this? If so, #90, one daily, refill x 3. Repeat lipids 6 months.

## 2022-12-19 RX ORDER — ATORVASTATIN CALCIUM 10 MG/1
10 TABLET, FILM COATED ORAL DAILY
Qty: 90 TABLET | Refills: 3 | Status: SHIPPED | OUTPATIENT
Start: 2022-12-19 | End: 2023-12-14

## 2023-02-02 ENCOUNTER — HOSPITAL ENCOUNTER (OUTPATIENT)
Dept: GENERAL RADIOLOGY | Facility: HOSPITAL | Age: 78
Discharge: HOME | End: 2023-02-02
Attending: ORTHOPAEDIC SURGERY
Payer: MEDICARE

## 2023-02-02 ENCOUNTER — OFFICE VISIT (OUTPATIENT)
Dept: ORTHOPEDICS CLINIC | Facility: CLINIC | Age: 78
End: 2023-02-02

## 2023-02-02 DIAGNOSIS — M17.11 PRIMARY OSTEOARTHRITIS OF RIGHT KNEE: Primary | ICD-10-CM

## 2023-02-02 DIAGNOSIS — M25.561 RIGHT KNEE PAIN, UNSPECIFIED CHRONICITY: ICD-10-CM

## 2023-02-02 PROCEDURE — 1126F AMNT PAIN NOTED NONE PRSNT: CPT | Performed by: ORTHOPAEDIC SURGERY

## 2023-02-02 PROCEDURE — 99204 OFFICE O/P NEW MOD 45 MIN: CPT | Performed by: ORTHOPAEDIC SURGERY

## 2023-02-02 PROCEDURE — 73564 X-RAY EXAM KNEE 4 OR MORE: CPT | Performed by: ORTHOPAEDIC SURGERY

## 2023-06-13 ENCOUNTER — OFFICE VISIT (OUTPATIENT)
Dept: INTERNAL MEDICINE CLINIC | Facility: CLINIC | Age: 78
End: 2023-06-13

## 2023-06-13 ENCOUNTER — LAB ENCOUNTER (OUTPATIENT)
Dept: LAB | Age: 78
End: 2023-06-13
Attending: INTERNAL MEDICINE
Payer: MEDICARE

## 2023-06-13 VITALS
WEIGHT: 146 LBS | HEART RATE: 86 BPM | DIASTOLIC BLOOD PRESSURE: 80 MMHG | SYSTOLIC BLOOD PRESSURE: 142 MMHG | HEIGHT: 61 IN | OXYGEN SATURATION: 100 % | BODY MASS INDEX: 27.56 KG/M2

## 2023-06-13 DIAGNOSIS — M25.561 ACUTE PAIN OF RIGHT KNEE: Primary | ICD-10-CM

## 2023-06-13 DIAGNOSIS — I10 ESSENTIAL HYPERTENSION: ICD-10-CM

## 2023-06-13 DIAGNOSIS — E78.00 HYPERCHOLESTEROLEMIA: ICD-10-CM

## 2023-06-13 DIAGNOSIS — Z12.31 ENCOUNTER FOR SCREENING MAMMOGRAM FOR HIGH-RISK PATIENT: ICD-10-CM

## 2023-06-13 DIAGNOSIS — Z78.0 POSTMENOPAUSAL: ICD-10-CM

## 2023-06-13 LAB
CHOLEST SERPL-MCNC: 168 MG/DL (ref ?–200)
FASTING PATIENT LIPID ANSWER: NO
HDLC SERPL-MCNC: 55 MG/DL (ref 40–59)
LDLC SERPL CALC-MCNC: 82 MG/DL (ref ?–100)
NONHDLC SERPL-MCNC: 113 MG/DL (ref ?–130)
TRIGL SERPL-MCNC: 187 MG/DL (ref 30–149)
VLDLC SERPL CALC-MCNC: 30 MG/DL (ref 0–30)

## 2023-06-13 PROCEDURE — 36415 COLL VENOUS BLD VENIPUNCTURE: CPT

## 2023-06-13 PROCEDURE — 80061 LIPID PANEL: CPT

## 2023-06-13 PROCEDURE — 99214 OFFICE O/P EST MOD 30 MIN: CPT | Performed by: INTERNAL MEDICINE

## 2023-06-18 ENCOUNTER — TELEPHONE (OUTPATIENT)
Dept: INTERNAL MEDICINE CLINIC | Facility: CLINIC | Age: 78
End: 2023-06-18

## 2023-09-15 ENCOUNTER — TELEPHONE (OUTPATIENT)
Dept: INTERNAL MEDICINE CLINIC | Facility: CLINIC | Age: 78
End: 2023-09-15

## 2023-09-27 ENCOUNTER — LAB ENCOUNTER (OUTPATIENT)
Dept: LAB | Facility: HOSPITAL | Age: 78
End: 2023-09-27
Attending: INTERNAL MEDICINE
Payer: MEDICARE

## 2023-09-27 ENCOUNTER — HOSPITAL ENCOUNTER (OUTPATIENT)
Dept: ULTRASOUND IMAGING | Facility: HOSPITAL | Age: 78
Discharge: HOME | End: 2023-09-27
Attending: INTERNAL MEDICINE
Payer: MEDICARE

## 2023-09-27 ENCOUNTER — OFFICE VISIT (OUTPATIENT)
Dept: INTERNAL MEDICINE CLINIC | Facility: CLINIC | Age: 78
End: 2023-09-27

## 2023-09-27 VITALS
HEIGHT: 61 IN | WEIGHT: 138.38 LBS | HEART RATE: 103 BPM | DIASTOLIC BLOOD PRESSURE: 84 MMHG | SYSTOLIC BLOOD PRESSURE: 144 MMHG | TEMPERATURE: 99 F | OXYGEN SATURATION: 97 % | BODY MASS INDEX: 26.13 KG/M2

## 2023-09-27 DIAGNOSIS — I10 PRIMARY HYPERTENSION: Primary | ICD-10-CM

## 2023-09-27 DIAGNOSIS — R60.9 EDEMA, UNSPECIFIED TYPE: ICD-10-CM

## 2023-09-27 DIAGNOSIS — R22.43 LOCALIZED SWELLING OF BOTH LOWER LEGS: ICD-10-CM

## 2023-09-27 DIAGNOSIS — M25.542 ARTHRALGIA OF BOTH HANDS: ICD-10-CM

## 2023-09-27 DIAGNOSIS — M25.541 ARTHRALGIA OF BOTH HANDS: ICD-10-CM

## 2023-09-27 DIAGNOSIS — M25.50 PAIN IN JOINTS: ICD-10-CM

## 2023-09-27 DIAGNOSIS — I10 PRIMARY HYPERTENSION: ICD-10-CM

## 2023-09-27 DIAGNOSIS — I10 ESSENTIAL HYPERTENSION: ICD-10-CM

## 2023-09-27 LAB
ALBUMIN SERPL-MCNC: 2.6 G/DL (ref 3.4–5)
ALBUMIN/GLOB SERPL: 0.7 {RATIO} (ref 1–2)
ALP LIVER SERPL-CCNC: 161 U/L
ALT SERPL-CCNC: 29 U/L
ANION GAP SERPL CALC-SCNC: 7 MMOL/L (ref 0–18)
AST SERPL-CCNC: 28 U/L (ref 15–37)
BASOPHILS # BLD AUTO: 0.04 X10(3) UL (ref 0–0.2)
BASOPHILS NFR BLD AUTO: 0.5 %
BILIRUB SERPL-MCNC: 0.5 MG/DL (ref 0.1–2)
BUN BLD-MCNC: 14 MG/DL (ref 7–18)
BUN/CREAT SERPL: 22.2 (ref 10–20)
CALCIUM BLD-MCNC: 8.7 MG/DL (ref 8.5–10.1)
CHLORIDE SERPL-SCNC: 107 MMOL/L (ref 98–112)
CHOLEST SERPL-MCNC: 97 MG/DL (ref ?–200)
CO2 SERPL-SCNC: 25 MMOL/L (ref 21–32)
CREAT BLD-MCNC: 0.63 MG/DL
DEPRECATED RDW RBC AUTO: 41.9 FL (ref 35.1–46.3)
EGFRCR SERPLBLD CKD-EPI 2021: 91 ML/MIN/1.73M2 (ref 60–?)
EOSINOPHIL # BLD AUTO: 0.1 X10(3) UL (ref 0–0.7)
EOSINOPHIL NFR BLD AUTO: 1.2 %
ERYTHROCYTE [DISTWIDTH] IN BLOOD BY AUTOMATED COUNT: 13 % (ref 11–15)
ERYTHROCYTE [SEDIMENTATION RATE] IN BLOOD: 23 MM/HR
FASTING PATIENT LIPID ANSWER: NO
FASTING STATUS PATIENT QL REPORTED: NO
GLOBULIN PLAS-MCNC: 3.9 G/DL (ref 2.8–4.4)
GLUCOSE BLD-MCNC: 105 MG/DL (ref 70–99)
HCT VFR BLD AUTO: 36.9 %
HDLC SERPL-MCNC: 27 MG/DL (ref 40–59)
HGB BLD-MCNC: 11.8 G/DL
IMM GRANULOCYTES # BLD AUTO: 0.06 X10(3) UL (ref 0–1)
IMM GRANULOCYTES NFR BLD: 0.7 %
LDLC SERPL CALC-MCNC: 43 MG/DL (ref ?–100)
LYMPHOCYTES # BLD AUTO: 1.69 X10(3) UL (ref 1–4)
LYMPHOCYTES NFR BLD AUTO: 20.3 %
MCH RBC QN AUTO: 28.4 PG (ref 26–34)
MCHC RBC AUTO-ENTMCNC: 32 G/DL (ref 31–37)
MCV RBC AUTO: 88.7 FL
MONOCYTES # BLD AUTO: 0.57 X10(3) UL (ref 0.1–1)
MONOCYTES NFR BLD AUTO: 6.8 %
NEUTROPHILS # BLD AUTO: 5.87 X10 (3) UL (ref 1.5–7.7)
NEUTROPHILS # BLD AUTO: 5.87 X10(3) UL (ref 1.5–7.7)
NEUTROPHILS NFR BLD AUTO: 70.5 %
NONHDLC SERPL-MCNC: 70 MG/DL (ref ?–130)
OSMOLALITY SERPL CALC.SUM OF ELEC: 289 MOSM/KG (ref 275–295)
PLATELET # BLD AUTO: 476 10(3)UL (ref 150–450)
POTASSIUM SERPL-SCNC: 4.1 MMOL/L (ref 3.5–5.1)
PROT SERPL-MCNC: 6.5 G/DL (ref 6.4–8.2)
RBC # BLD AUTO: 4.16 X10(6)UL
RHEUMATOID FACT SERPL-ACNC: <10 IU/ML (ref ?–15)
SODIUM SERPL-SCNC: 139 MMOL/L (ref 136–145)
TRIGL SERPL-MCNC: 159 MG/DL (ref 30–149)
TSI SER-ACNC: 3.5 MIU/ML (ref 0.36–3.74)
VLDLC SERPL CALC-MCNC: 22 MG/DL (ref 0–30)
WBC # BLD AUTO: 8.3 X10(3) UL (ref 4–11)

## 2023-09-27 PROCEDURE — 80061 LIPID PANEL: CPT

## 2023-09-27 PROCEDURE — 86431 RHEUMATOID FACTOR QUANT: CPT

## 2023-09-27 PROCEDURE — 1126F AMNT PAIN NOTED NONE PRSNT: CPT | Performed by: INTERNAL MEDICINE

## 2023-09-27 PROCEDURE — 93970 EXTREMITY STUDY: CPT | Performed by: INTERNAL MEDICINE

## 2023-09-27 PROCEDURE — 85025 COMPLETE CBC W/AUTO DIFF WBC: CPT

## 2023-09-27 PROCEDURE — 36415 COLL VENOUS BLD VENIPUNCTURE: CPT

## 2023-09-27 PROCEDURE — 85652 RBC SED RATE AUTOMATED: CPT

## 2023-09-27 PROCEDURE — 84443 ASSAY THYROID STIM HORMONE: CPT

## 2023-09-27 PROCEDURE — 99214 OFFICE O/P EST MOD 30 MIN: CPT | Performed by: INTERNAL MEDICINE

## 2023-09-27 PROCEDURE — 80053 COMPREHEN METABOLIC PANEL: CPT

## 2023-09-27 RX ORDER — HYDROCHLOROTHIAZIDE 12.5 MG/1
12.5 TABLET ORAL DAILY
Qty: 90 TABLET | Refills: 3 | Status: SHIPPED | OUTPATIENT
Start: 2023-09-27 | End: 2024-09-21

## 2023-09-28 ENCOUNTER — TELEPHONE (OUTPATIENT)
Dept: INTERNAL MEDICINE CLINIC | Facility: CLINIC | Age: 78
End: 2023-09-28

## 2023-09-28 PROBLEM — M25.50 PAIN IN JOINTS: Status: ACTIVE | Noted: 2023-09-28

## 2023-09-28 PROBLEM — R22.43 LOCALIZED SWELLING OF BOTH LOWER LEGS: Status: ACTIVE | Noted: 2023-09-28

## 2023-09-28 NOTE — TELEPHONE ENCOUNTER
Labs including CBC, CMP, lipids, thyroid all normal - continue same meds     Tests for arthritis are negative.  Continue Tylenol for discomfort

## 2023-10-07 ENCOUNTER — TELEPHONE (OUTPATIENT)
Dept: INTERNAL MEDICINE CLINIC | Facility: CLINIC | Age: 78
End: 2023-10-07

## 2023-10-07 DIAGNOSIS — R79.89 ELEVATED LIVER FUNCTION TESTS: Primary | ICD-10-CM

## 2023-10-07 DIAGNOSIS — D64.9 ANEMIA, UNSPECIFIED TYPE: ICD-10-CM

## 2023-10-07 NOTE — TELEPHONE ENCOUNTER
She has had a drop in her Hb from 14 to 11.8 -  normal is 12 and above.      Lipids, thyroid and arthritis tests all normal.     One liver function test a little elevated (alk phos)    I want to repeat CBC, iron and TIBC and ferritin; also hepatic function profile  - do within the next several weeks

## 2023-10-10 ENCOUNTER — TELEPHONE (OUTPATIENT)
Dept: INTERNAL MEDICINE CLINIC | Facility: CLINIC | Age: 78
End: 2023-10-10

## 2023-10-10 NOTE — TELEPHONE ENCOUNTER
Please add patient to Dr. Perez Quant schedule 10/18 at 12:30. Change to office visit.   Patient aware    To [id number]

## 2023-10-10 NOTE — TELEPHONE ENCOUNTER
Pt called hoping to see Dr Alita Schaumann sooner than she's Scheduled on Nov 1  Pt feels very weak  Has no energy  Arms/legs ache  Also has a metallic taste in her mouth  Pt thought it was stress related as her  has been hospitalized and is in rehab now    Please call patient on her 's cell # [phone]

## 2023-10-10 NOTE — TELEPHONE ENCOUNTER
Pt called asks if she can see Dr Hernandez General soon  Pt is feeling very weak  She has no energy, arms & legs ache

## 2023-10-10 NOTE — TELEPHONE ENCOUNTER
Spoke with patient states over last 4-6 weeks she has been feeling weak, low energy, metallic taste in her mouth, and arms and legs ache. Patient states she also seems to have no  strength in both hands. States she may feel a little sob when walking. Denies fever, chest pain, wheezing, hemiparesis, vision changes, trouble finding words, speech changes, dizziness, lightheadedness, headache    Patient is at rehab facility with her  right now. States her bp has been pretty normal but she doesn't remember the numbers. Advised ED for evaluation of weakness, sob with exertion, loss of strength in both hands. Patient declined ED, stating they are not going to do anything. Patient states she is willing to come to the office. No openings available. Patient has appointment Nov.1.    Patient requesting recommendation from Dr. Olga Vickers  To Dr. Olga Vickers please advise        Please call patient on her 's cell # [phone]

## 2023-10-10 NOTE — TELEPHONE ENCOUNTER
Spoke with patient relayed physician message below. Patient verbalized understanding.     Labs ordered

## 2023-10-11 ENCOUNTER — LAB ENCOUNTER (OUTPATIENT)
Dept: LAB | Age: 78
End: 2023-10-11
Attending: INTERNAL MEDICINE
Payer: MEDICARE

## 2023-10-11 DIAGNOSIS — R79.89 ELEVATED LIVER FUNCTION TESTS: ICD-10-CM

## 2023-10-11 DIAGNOSIS — D64.9 ANEMIA, UNSPECIFIED TYPE: ICD-10-CM

## 2023-10-11 LAB
ALBUMIN SERPL-MCNC: 2.5 G/DL (ref 3.4–5)
ALP LIVER SERPL-CCNC: 136 U/L
ALT SERPL-CCNC: 23 U/L
AST SERPL-CCNC: 31 U/L (ref 15–37)
BASOPHILS # BLD AUTO: 0.03 X10(3) UL (ref 0–0.2)
BASOPHILS NFR BLD AUTO: 0.5 %
BILIRUB DIRECT SERPL-MCNC: 0.2 MG/DL (ref 0–0.2)
BILIRUB SERPL-MCNC: 0.6 MG/DL (ref 0.1–2)
DEPRECATED HBV CORE AB SER IA-ACNC: 606.8 NG/ML
DEPRECATED RDW RBC AUTO: 44 FL (ref 35.1–46.3)
EOSINOPHIL # BLD AUTO: 0.01 X10(3) UL (ref 0–0.7)
EOSINOPHIL NFR BLD AUTO: 0.2 %
ERYTHROCYTE [DISTWIDTH] IN BLOOD BY AUTOMATED COUNT: 13.7 % (ref 11–15)
HCT VFR BLD AUTO: 35.1 %
HGB BLD-MCNC: 11.4 G/DL
IMM GRANULOCYTES # BLD AUTO: 0.06 X10(3) UL (ref 0–1)
IMM GRANULOCYTES NFR BLD: 0.9 %
IRON SATN MFR SERPL: 11 %
IRON SERPL-MCNC: 31 UG/DL
LYMPHOCYTES # BLD AUTO: 1.03 X10(3) UL (ref 1–4)
LYMPHOCYTES NFR BLD AUTO: 16 %
MCH RBC QN AUTO: 28.6 PG (ref 26–34)
MCHC RBC AUTO-ENTMCNC: 32.5 G/DL (ref 31–37)
MCV RBC AUTO: 88.2 FL
MONOCYTES # BLD AUTO: 0.3 X10(3) UL (ref 0.1–1)
MONOCYTES NFR BLD AUTO: 4.7 %
NEUTROPHILS # BLD AUTO: 5.02 X10 (3) UL (ref 1.5–7.7)
NEUTROPHILS # BLD AUTO: 5.02 X10(3) UL (ref 1.5–7.7)
NEUTROPHILS NFR BLD AUTO: 77.7 %
PLATELET # BLD AUTO: 287 10(3)UL (ref 150–450)
PROT SERPL-MCNC: 5.9 G/DL (ref 6.4–8.2)
RBC # BLD AUTO: 3.98 X10(6)UL
TIBC SERPL-MCNC: 274 UG/DL (ref 240–450)
TRANSFERRIN SERPL-MCNC: 184 MG/DL (ref 200–360)
WBC # BLD AUTO: 6.5 X10(3) UL (ref 4–11)

## 2023-10-11 PROCEDURE — 85025 COMPLETE CBC W/AUTO DIFF WBC: CPT

## 2023-10-11 PROCEDURE — 84466 ASSAY OF TRANSFERRIN: CPT

## 2023-10-11 PROCEDURE — 82728 ASSAY OF FERRITIN: CPT

## 2023-10-11 PROCEDURE — 83540 ASSAY OF IRON: CPT

## 2023-10-11 PROCEDURE — 36415 COLL VENOUS BLD VENIPUNCTURE: CPT

## 2023-10-11 PROCEDURE — 80076 HEPATIC FUNCTION PANEL: CPT

## 2023-10-18 ENCOUNTER — OFFICE VISIT (OUTPATIENT)
Dept: INTERNAL MEDICINE CLINIC | Facility: CLINIC | Age: 78
End: 2023-10-18

## 2023-10-18 VITALS
WEIGHT: 138 LBS | TEMPERATURE: 98 F | HEIGHT: 61 IN | SYSTOLIC BLOOD PRESSURE: 130 MMHG | HEART RATE: 120 BPM | BODY MASS INDEX: 26.06 KG/M2 | OXYGEN SATURATION: 98 % | DIASTOLIC BLOOD PRESSURE: 78 MMHG

## 2023-10-18 DIAGNOSIS — D50.0 IRON DEFICIENCY ANEMIA DUE TO CHRONIC BLOOD LOSS: ICD-10-CM

## 2023-10-18 DIAGNOSIS — R22.43 LOCALIZED SWELLING OF BOTH LOWER LEGS: Primary | ICD-10-CM

## 2023-10-18 DIAGNOSIS — M79.10 MYALGIA: ICD-10-CM

## 2023-10-18 DIAGNOSIS — R53.1 WEAKNESS: ICD-10-CM

## 2023-10-18 DIAGNOSIS — I10 ESSENTIAL HYPERTENSION: ICD-10-CM

## 2023-10-18 PROCEDURE — 99214 OFFICE O/P EST MOD 30 MIN: CPT | Performed by: INTERNAL MEDICINE

## 2023-10-18 PROCEDURE — G0008 ADMIN INFLUENZA VIRUS VAC: HCPCS | Performed by: INTERNAL MEDICINE

## 2023-10-18 PROCEDURE — 1126F AMNT PAIN NOTED NONE PRSNT: CPT | Performed by: INTERNAL MEDICINE

## 2023-10-18 PROCEDURE — 90662 IIV NO PRSV INCREASED AG IM: CPT | Performed by: INTERNAL MEDICINE

## 2023-10-18 RX ORDER — PREDNISONE 10 MG/1
TABLET ORAL
Qty: 100 TABLET | Refills: 1 | Status: SHIPPED | OUTPATIENT
Start: 2023-10-18

## 2023-10-18 RX ORDER — CLOTRIMAZOLE 10 MG/1
10 LOZENGE ORAL; TOPICAL 4 TIMES DAILY
Qty: 20 TROCHE | Refills: 0 | Status: SHIPPED | OUTPATIENT
Start: 2023-10-18 | End: 2023-11-08 | Stop reason: ALTCHOICE

## 2023-10-18 NOTE — PROGRESS NOTES
Julissa Ferro is a 78 year old female.  HPI:   She does not feels well.    Marked fatigue, appetite very poor, eating about 1/2 - weight stable  No nausea or vomiting    Hb down from 14 to 11.4 - no black or bloody BMs     Neck and shoulders stiff upon arising and goes away as day goes on.    Hand  weak     Sed rated and RA factor normal. Ferritin very high in face of low iron indicating inflammation     4-5 episodes of diarrhea this week.     She has mild oral thrush       SR: no chest pain or sob, no gu or gi sx.    BP Readings from Last 6 Encounters:  10/18/23 : 130/78  09/27/23 : 144/84  06/13/23 : 142/80  12/12/22 : 160/86  06/13/22 : (!) 142/106  12/13/21 : (!) 180/96    Wt Readings from Last 6 Encounters:  10/18/23 : 138 lb (62.6 kg)  09/27/23 : 138 lb 6.4 oz (62.8 kg)  06/13/23 : 146 lb (66.2 kg)  12/12/22 : 144 lb 12.8 oz (65.7 kg)  06/13/22 : 142 lb (64.4 kg)  12/13/21 : 138 lb 9.6 oz (62.9 kg)    Current Outpatient Medications   Medication Sig Dispense Refill    hydroCHLOROthiazide 12.5 MG Oral Tab Take 1 tablet (12.5 mg total) by mouth daily. 90 tablet 3    Boswellia Gee (BOSWELLIA OR) Take by mouth daily.      atorvastatin (LIPITOR) 10 MG Oral Tab Take 1 tablet (10 mg total) by mouth daily. 90 tablet 3    enalapril 20 MG Oral Tab Take 1 tablet (20 mg total) by mouth daily. 90 tablet 3    Calcium Carbonate-Vitamin D (OSCAL 500/200 D-3 OR) Take 1 tablet by mouth 2 (two) times daily.      B Complex-C (SUPER B COMPLEX OR) Take 1 tablet by mouth daily.      Multiple Vitamins-Minerals (CENTRUM SILVER) Oral Tab Take 1 tablet by mouth daily.        Past Medical History:   Diagnosis Date    Fracture dislocation of left shoulder joint     w/ nerve injury 2009    Injury of nerve, shoulder or arm     w/ dislocation fx of L shoulder 2009    Nerve injury     nerve damage left hand 2009      Social History:  Social History     Socioeconomic History    Marital status:    Tobacco Use    Smoking status:  Never    Smokeless tobacco: Never   Vaping Use    Vaping Use: Never used   Substance and Sexual Activity    Alcohol use: Yes     Alcohol/week: 0.0 standard drinks of alcohol     Comment: rarely. 3 drinks a year    Drug use: No   Other Topics Concern    Caffeine Concern Yes     Comment: Coffee 2 cups daily        REVIEW OF SYSTEMS:   GENERAL HEALTH: feels well otherwise  SKIN: denies any unusual skin lesions or rashes  RESPIRATORY: denies shortness of breath with exertion  CARDIOVASCULAR: denies chest pain on exertion  GI: denies abdominal pain and denies heartburn  NEURO: denies headaches    EXAM:   /78   Pulse 120   Temp 97.9 °F (36.6 °C)   Ht 5' 1\" (1.549 m)   Wt 138 lb (62.6 kg)   SpO2 98%   BMI 26.07 kg/m²   GENERAL: well developed, well nourished,in no apparent distress  SKIN: no rashes,no suspicious lesions  HEENT: atraumatic, normocephalic,ears and throat are clear  NECK: supple,no adenopathy,no bruits  LUNGS: clear to auscultation  CARDIO: RRR without murmur  GI: good BS's,no masses, HSM or tenderness  EXTREMITIES: no cyanosis, clubbing or edema    ASSESSMENT AND PLAN:   1. Localized swelling of both lower legs  Edema virtually gone on hydrochlorothiazide.    2. Essential hypertension  At goal, same medication    3. Myalgia  Myalgias virtually gone on tapering doses of steroids.  Diagnosis is polymyalgia rheumatica    4. Weakness  Marked improvement    5. Iron deficiency anemia due to chronic blood loss  Most likely anemia of chronic inflammation.    The patient indicates understanding of these issues and agrees to the plan.  The patient is asked to return in 1 month or sooner as needed.

## 2023-10-23 NOTE — TELEPHONE ENCOUNTER
Patient is calling checking on her refill for Enalapril Maleate 20MG Tab. Patient states she only has 1 pill left.     Please send to [facility] pharmacy

## 2023-10-24 RX ORDER — ENALAPRIL MALEATE 20 MG/1
20 TABLET ORAL DAILY
Qty: 90 TABLET | Refills: 3 | Status: SHIPPED | OUTPATIENT
Start: 2023-10-24

## 2023-10-24 NOTE — TELEPHONE ENCOUNTER
Refill request is for a maintenance medication and has met the criteria specified in the Ambulatory Medication Refill Standing Order for eligibility, visits, laboratory, alerts and was sent to the requested pharmacy.     Requested Prescriptions     Signed Prescriptions Disp Refills    ENALAPRIL 20 MG Oral Tab 90 tablet 3     Sig: TAKE 1 TABLET(20 MG) BY MOUTH DAILY     Authorizing Provider: Dana Nuñez     Ordering User: Donnalee Runner

## 2023-11-02 ENCOUNTER — LAB ENCOUNTER (OUTPATIENT)
Dept: LAB | Age: 78
End: 2023-11-02
Attending: INTERNAL MEDICINE
Payer: MEDICARE

## 2023-11-02 DIAGNOSIS — R53.1 WEAKNESS: ICD-10-CM

## 2023-11-02 DIAGNOSIS — R22.43 LOCALIZED SWELLING OF BOTH LOWER LEGS: ICD-10-CM

## 2023-11-02 DIAGNOSIS — M79.10 MYALGIA: ICD-10-CM

## 2023-11-02 DIAGNOSIS — E78.00 HYPERCHOLESTEREMIA: ICD-10-CM

## 2023-11-02 LAB
ALBUMIN SERPL-MCNC: 3.5 G/DL (ref 3.2–4.8)
ALBUMIN/GLOB SERPL: 1.5 {RATIO} (ref 1–2)
ALP LIVER SERPL-CCNC: 115 U/L
ALT SERPL-CCNC: 21 U/L
ANION GAP SERPL CALC-SCNC: 9 MMOL/L (ref 0–18)
AST SERPL-CCNC: 29 U/L (ref ?–34)
BASOPHILS # BLD AUTO: 0.06 X10(3) UL (ref 0–0.2)
BASOPHILS NFR BLD AUTO: 0.5 %
BILIRUB SERPL-MCNC: 0.9 MG/DL (ref 0.2–1.1)
BUN BLD-MCNC: 14 MG/DL (ref 9–23)
BUN/CREAT SERPL: 23.3 (ref 10–20)
CALCIUM BLD-MCNC: 9.4 MG/DL (ref 8.7–10.4)
CHLORIDE SERPL-SCNC: 105 MMOL/L (ref 98–112)
CHOLEST SERPL-MCNC: 158 MG/DL (ref ?–200)
CO2 SERPL-SCNC: 26 MMOL/L (ref 21–32)
CREAT BLD-MCNC: 0.6 MG/DL
CRP SERPL-MCNC: 1.7 MG/DL (ref ?–1)
DEPRECATED HBV CORE AB SER IA-ACNC: 578.9 NG/ML
DEPRECATED RDW RBC AUTO: 49 FL (ref 35.1–46.3)
EGFRCR SERPLBLD CKD-EPI 2021: 92 ML/MIN/1.73M2 (ref 60–?)
EOSINOPHIL # BLD AUTO: 0.12 X10(3) UL (ref 0–0.7)
EOSINOPHIL NFR BLD AUTO: 0.9 %
ERYTHROCYTE [DISTWIDTH] IN BLOOD BY AUTOMATED COUNT: 15.4 % (ref 11–15)
ERYTHROCYTE [SEDIMENTATION RATE] IN BLOOD: 22 MM/HR
FASTING PATIENT LIPID ANSWER: NO
FASTING STATUS PATIENT QL REPORTED: NO
GLOBULIN PLAS-MCNC: 2.4 G/DL (ref 2.8–4.4)
GLUCOSE BLD-MCNC: 109 MG/DL (ref 70–99)
HCT VFR BLD AUTO: 36.6 %
HDLC SERPL-MCNC: 43 MG/DL (ref 40–59)
HGB BLD-MCNC: 11.8 G/DL
IMM GRANULOCYTES # BLD AUTO: 0.15 X10(3) UL (ref 0–1)
IMM GRANULOCYTES NFR BLD: 1.2 %
LDLC SERPL CALC-MCNC: 75 MG/DL (ref ?–100)
LYMPHOCYTES # BLD AUTO: 0.97 X10(3) UL (ref 1–4)
LYMPHOCYTES NFR BLD AUTO: 7.6 %
MCH RBC QN AUTO: 28.6 PG (ref 26–34)
MCHC RBC AUTO-ENTMCNC: 32.2 G/DL (ref 31–37)
MCV RBC AUTO: 88.6 FL
MONOCYTES # BLD AUTO: 0.6 X10(3) UL (ref 0.1–1)
MONOCYTES NFR BLD AUTO: 4.7 %
NEUTROPHILS # BLD AUTO: 10.81 X10 (3) UL (ref 1.5–7.7)
NEUTROPHILS # BLD AUTO: 10.81 X10(3) UL (ref 1.5–7.7)
NEUTROPHILS NFR BLD AUTO: 85.1 %
NONHDLC SERPL-MCNC: 115 MG/DL (ref ?–130)
OSMOLALITY SERPL CALC.SUM OF ELEC: 291 MOSM/KG (ref 275–295)
PLATELET # BLD AUTO: 306 10(3)UL (ref 150–450)
POTASSIUM SERPL-SCNC: 3.8 MMOL/L (ref 3.5–5.1)
PROT SERPL-MCNC: 5.9 G/DL (ref 5.7–8.2)
RBC # BLD AUTO: 4.13 X10(6)UL
SODIUM SERPL-SCNC: 140 MMOL/L (ref 136–145)
TRIGL SERPL-MCNC: 246 MG/DL (ref 30–149)
VLDLC SERPL CALC-MCNC: 38 MG/DL (ref 0–30)
WBC # BLD AUTO: 12.7 X10(3) UL (ref 4–11)

## 2023-11-02 PROCEDURE — 85025 COMPLETE CBC W/AUTO DIFF WBC: CPT

## 2023-11-02 PROCEDURE — 86140 C-REACTIVE PROTEIN: CPT

## 2023-11-02 PROCEDURE — 82728 ASSAY OF FERRITIN: CPT

## 2023-11-02 PROCEDURE — 80053 COMPREHEN METABOLIC PANEL: CPT

## 2023-11-02 PROCEDURE — 36415 COLL VENOUS BLD VENIPUNCTURE: CPT

## 2023-11-02 PROCEDURE — 85652 RBC SED RATE AUTOMATED: CPT

## 2023-11-02 PROCEDURE — 80061 LIPID PANEL: CPT

## 2023-11-08 ENCOUNTER — OFFICE VISIT (OUTPATIENT)
Dept: INTERNAL MEDICINE CLINIC | Facility: CLINIC | Age: 78
End: 2023-11-08

## 2023-11-08 VITALS
HEIGHT: 61 IN | DIASTOLIC BLOOD PRESSURE: 84 MMHG | WEIGHT: 126 LBS | OXYGEN SATURATION: 93 % | TEMPERATURE: 99 F | BODY MASS INDEX: 23.79 KG/M2 | SYSTOLIC BLOOD PRESSURE: 148 MMHG | HEART RATE: 113 BPM

## 2023-11-08 DIAGNOSIS — I10 ESSENTIAL HYPERTENSION: ICD-10-CM

## 2023-11-08 DIAGNOSIS — M35.3 POLYMYALGIA RHEUMATICA (HCC): Primary | ICD-10-CM

## 2023-11-08 PROCEDURE — 1126F AMNT PAIN NOTED NONE PRSNT: CPT | Performed by: INTERNAL MEDICINE

## 2023-11-08 PROCEDURE — 99213 OFFICE O/P EST LOW 20 MIN: CPT | Performed by: INTERNAL MEDICINE

## 2023-12-13 ENCOUNTER — IMMUNIZATION (OUTPATIENT)
Dept: LAB | Age: 78
End: 2023-12-13
Attending: EMERGENCY MEDICINE
Payer: MEDICARE

## 2023-12-13 DIAGNOSIS — Z23 NEED FOR VACCINATION: Primary | ICD-10-CM

## 2023-12-13 PROCEDURE — 90480 ADMN SARSCOV2 VAC 1/ONLY CMP: CPT

## 2023-12-21 ENCOUNTER — APPOINTMENT (OUTPATIENT)
Dept: OBGYN | Age: 78
End: 2023-12-21

## 2023-12-21 VITALS
WEIGHT: 118.94 LBS | HEIGHT: 61 IN | OXYGEN SATURATION: 88 % | SYSTOLIC BLOOD PRESSURE: 156 MMHG | BODY MASS INDEX: 22.46 KG/M2 | DIASTOLIC BLOOD PRESSURE: 97 MMHG | TEMPERATURE: 98 F | HEART RATE: 78 BPM

## 2023-12-21 DIAGNOSIS — N81.4 UTERINE PROLAPSE: ICD-10-CM

## 2023-12-21 DIAGNOSIS — N89.8 VAGINAL EROSION: Primary | ICD-10-CM

## 2023-12-21 PROCEDURE — 57160 INSERT PESSARY/OTHER DEVICE: CPT | Performed by: OBSTETRICS & GYNECOLOGY

## 2023-12-21 PROCEDURE — 99204 OFFICE O/P NEW MOD 45 MIN: CPT | Performed by: OBSTETRICS & GYNECOLOGY

## 2023-12-21 RX ORDER — ESTRADIOL 0.1 MG/G
2 CREAM VAGINAL DAILY
Qty: 42.5 G | Refills: 12 | Status: SHIPPED | OUTPATIENT
Start: 2023-12-21

## 2023-12-21 RX ORDER — PREDNISONE 10 MG/1
TABLET ORAL
COMMUNITY

## 2023-12-21 RX ORDER — HYDROCHLOROTHIAZIDE 12.5 MG/1
12.5 TABLET ORAL
COMMUNITY
Start: 2023-09-27 | End: 2024-09-21

## 2023-12-21 RX ORDER — ATORVASTATIN CALCIUM 10 MG/1
TABLET, FILM COATED ORAL
COMMUNITY
Start: 2023-09-15

## 2023-12-21 RX ORDER — ENALAPRIL MALEATE 20 MG/1
1 TABLET ORAL DAILY
COMMUNITY
Start: 2023-10-24

## 2024-01-01 ENCOUNTER — APPOINTMENT (OUTPATIENT)
Dept: GENERAL RADIOLOGY | Facility: HOSPITAL | Age: 79
End: 2024-01-01
Attending: INTERNAL MEDICINE
Payer: MEDICARE

## 2024-01-01 ENCOUNTER — APPOINTMENT (OUTPATIENT)
Dept: MRI IMAGING | Facility: HOSPITAL | Age: 79
End: 2024-01-01
Attending: Other
Payer: MEDICARE

## 2024-01-01 ENCOUNTER — APPOINTMENT (OUTPATIENT)
Dept: GENERAL RADIOLOGY | Facility: HOSPITAL | Age: 79
End: 2024-01-01
Attending: HOSPITALIST
Payer: MEDICARE

## 2024-01-01 ENCOUNTER — APPOINTMENT (OUTPATIENT)
Dept: ULTRASOUND IMAGING | Facility: HOSPITAL | Age: 79
End: 2024-01-01
Attending: UROLOGY
Payer: MEDICARE

## 2024-01-01 ENCOUNTER — TELEPHONE (OUTPATIENT)
Dept: INTERNAL MEDICINE CLINIC | Facility: CLINIC | Age: 79
End: 2024-01-01

## 2024-01-01 ENCOUNTER — APPOINTMENT (OUTPATIENT)
Dept: GENERAL RADIOLOGY | Facility: HOSPITAL | Age: 79
End: 2024-01-01
Attending: STUDENT IN AN ORGANIZED HEALTH CARE EDUCATION/TRAINING PROGRAM
Payer: MEDICARE

## 2024-01-01 ENCOUNTER — HOSPITAL ENCOUNTER (INPATIENT)
Facility: HOSPITAL | Age: 79
LOS: 6 days | Discharge: HOSPICE | End: 2024-01-01
Attending: EMERGENCY MEDICINE | Admitting: INTERNAL MEDICINE
Payer: MEDICARE

## 2024-01-01 ENCOUNTER — HOSPITAL ENCOUNTER (INPATIENT)
Facility: HOSPITAL | Age: 79
LOS: 4 days | End: 2024-01-01
Attending: INTERNAL MEDICINE | Admitting: INTERNAL MEDICINE
Payer: OTHER MISCELLANEOUS

## 2024-01-01 ENCOUNTER — APPOINTMENT (OUTPATIENT)
Dept: CV DIAGNOSTICS | Facility: HOSPITAL | Age: 79
End: 2024-01-01
Attending: HOSPITALIST
Payer: MEDICARE

## 2024-01-01 ENCOUNTER — TELEPHONE (OUTPATIENT)
Dept: INTERNAL MEDICINE UNIT | Facility: HOSPITAL | Age: 79
End: 2024-01-01

## 2024-01-01 ENCOUNTER — APPOINTMENT (OUTPATIENT)
Dept: CT IMAGING | Facility: HOSPITAL | Age: 79
End: 2024-01-01
Attending: HOSPITALIST
Payer: MEDICARE

## 2024-01-01 ENCOUNTER — APPOINTMENT (OUTPATIENT)
Dept: CT IMAGING | Facility: HOSPITAL | Age: 79
End: 2024-01-01
Attending: INTERNAL MEDICINE
Payer: MEDICARE

## 2024-01-01 ENCOUNTER — APPOINTMENT (OUTPATIENT)
Dept: ULTRASOUND IMAGING | Facility: HOSPITAL | Age: 79
End: 2024-01-01
Attending: INTERNAL MEDICINE
Payer: MEDICARE

## 2024-01-01 VITALS
HEART RATE: 77 BPM | DIASTOLIC BLOOD PRESSURE: 46 MMHG | OXYGEN SATURATION: 71 % | RESPIRATION RATE: 14 BRPM | SYSTOLIC BLOOD PRESSURE: 81 MMHG | TEMPERATURE: 97 F

## 2024-01-01 VITALS
HEIGHT: 61 IN | SYSTOLIC BLOOD PRESSURE: 108 MMHG | OXYGEN SATURATION: 97 % | HEART RATE: 102 BPM | BODY MASS INDEX: 17.57 KG/M2 | DIASTOLIC BLOOD PRESSURE: 61 MMHG | RESPIRATION RATE: 30 BRPM | TEMPERATURE: 97 F | WEIGHT: 93.06 LBS

## 2024-01-01 DIAGNOSIS — R53.1 WEAKNESS GENERALIZED: Primary | ICD-10-CM

## 2024-01-01 DIAGNOSIS — R52 PAIN: Primary | ICD-10-CM

## 2024-01-01 LAB
ALBUMIN SERPL ELPH-MCNC: 2.41 G/DL (ref 3.75–5.21)
ALBUMIN SERPL-MCNC: 2.4 G/DL (ref 3.2–4.8)
ALBUMIN SERPL-MCNC: 3.1 G/DL (ref 3.2–4.8)
ALBUMIN/GLOB SERPL: 0.89 {RATIO} (ref 1–2)
ALBUMIN/GLOB SERPL: 0.9 {RATIO} (ref 1–2)
ALP LIVER SERPL-CCNC: 84 U/L
ALP LIVER SERPL-CCNC: 90 U/L
ALPHA1 GLOB SERPL ELPH-MCNC: 0.43 G/DL (ref 0.19–0.46)
ALPHA2 GLOB SERPL ELPH-MCNC: 0.65 G/DL (ref 0.48–1.05)
ALT SERPL-CCNC: 10 U/L
ALT SERPL-CCNC: 8 U/L
AMMONIA PLAS-MCNC: <10 UMOL/L (ref 11–32)
ANION GAP SERPL CALC-SCNC: 10 MMOL/L (ref 0–18)
ANION GAP SERPL CALC-SCNC: 10 MMOL/L (ref 0–18)
ANION GAP SERPL CALC-SCNC: 4 MMOL/L (ref 0–18)
ANION GAP SERPL CALC-SCNC: 6 MMOL/L (ref 0–18)
ANION GAP SERPL CALC-SCNC: 7 MMOL/L (ref 0–18)
ANION GAP SERPL CALC-SCNC: 8 MMOL/L (ref 0–18)
ANION GAP SERPL CALC-SCNC: 8 MMOL/L (ref 0–18)
AST SERPL-CCNC: 32 U/L (ref ?–34)
AST SERPL-CCNC: 36 U/L (ref ?–34)
ATRIAL RATE: 107 BPM
ATRIAL RATE: 144 BPM
B-GLOBULIN SERPL ELPH-MCNC: 0.79 G/DL (ref 0.68–1.23)
BASE EXCESS BLD CALC-SCNC: 0 MMOL/L (ref ?–2)
BASE EXCESS BLD CALC-SCNC: 1.3 MMOL/L (ref ?–2)
BASOPHILS # BLD AUTO: 0 X10(3) UL (ref 0–0.2)
BASOPHILS # BLD AUTO: 0.01 X10(3) UL (ref 0–0.2)
BASOPHILS # BLD AUTO: 0.02 X10(3) UL (ref 0–0.2)
BASOPHILS # BLD AUTO: 0.03 X10(3) UL (ref 0–0.2)
BASOPHILS # BLD AUTO: 0.03 X10(3) UL (ref 0–0.2)
BASOPHILS NFR BLD AUTO: 0 %
BASOPHILS NFR BLD AUTO: 0.1 %
BASOPHILS NFR BLD AUTO: 0.1 %
BASOPHILS NFR BLD AUTO: 0.2 %
BASOPHILS NFR BLD AUTO: 0.5 %
BILIRUB DIRECT SERPL-MCNC: 0.2 MG/DL (ref ?–0.3)
BILIRUB SERPL-MCNC: 0.6 MG/DL (ref 0.2–1.1)
BILIRUB SERPL-MCNC: 0.9 MG/DL (ref 0.2–1.1)
BILIRUB UR QL: NEGATIVE
BNP SERPL-MCNC: 281 PG/ML
BUN BLD-MCNC: 16 MG/DL (ref 9–23)
BUN BLD-MCNC: 17 MG/DL (ref 9–23)
BUN BLD-MCNC: 17 MG/DL (ref 9–23)
BUN BLD-MCNC: 19 MG/DL (ref 9–23)
BUN BLD-MCNC: 23 MG/DL (ref 9–23)
BUN BLD-MCNC: 36 MG/DL (ref 9–23)
BUN BLD-MCNC: 46 MG/DL (ref 9–23)
BUN/CREAT SERPL: 16.2 (ref 10–20)
BUN/CREAT SERPL: 25 (ref 10–20)
BUN/CREAT SERPL: 25.4 (ref 10–20)
BUN/CREAT SERPL: 26.4 (ref 10–20)
BUN/CREAT SERPL: 27.7 (ref 10–20)
BUN/CREAT SERPL: 28.4 (ref 10–20)
BUN/CREAT SERPL: 28.6 (ref 10–20)
CALCIUM BLD-MCNC: 10.1 MG/DL (ref 8.7–10.4)
CALCIUM BLD-MCNC: 10.2 MG/DL (ref 8.7–10.4)
CALCIUM BLD-MCNC: 10.4 MG/DL (ref 8.7–10.4)
CALCIUM BLD-MCNC: 10.7 MG/DL (ref 8.7–10.4)
CALCIUM BLD-MCNC: 10.7 MG/DL (ref 8.7–10.4)
CALCIUM BLD-MCNC: 12.2 MG/DL (ref 8.7–10.4)
CALCIUM BLD-MCNC: 9.8 MG/DL (ref 8.7–10.4)
CANCER AG125 SERPL-ACNC: 9 U/ML (ref ?–30.2)
CANCER AG19-9 SERPL-ACNC: 3.1 U/ML (ref ?–35)
CEA SERPL-MCNC: 0.8 NG/ML (ref ?–5)
CHLORIDE SERPL-SCNC: 103 MMOL/L (ref 98–112)
CHLORIDE SERPL-SCNC: 104 MMOL/L (ref 98–112)
CHLORIDE SERPL-SCNC: 105 MMOL/L (ref 98–112)
CHLORIDE SERPL-SCNC: 109 MMOL/L (ref 98–112)
CHLORIDE SERPL-SCNC: 113 MMOL/L (ref 98–112)
CK SERPL-CCNC: 93 U/L
CLARITY UR: CLEAR
CO2 SERPL-SCNC: 19 MMOL/L (ref 21–32)
CO2 SERPL-SCNC: 22 MMOL/L (ref 21–32)
CO2 SERPL-SCNC: 22 MMOL/L (ref 21–32)
CO2 SERPL-SCNC: 23 MMOL/L (ref 21–32)
CO2 SERPL-SCNC: 24 MMOL/L (ref 21–32)
CO2 SERPL-SCNC: 26 MMOL/L (ref 21–32)
CO2 SERPL-SCNC: 30 MMOL/L (ref 21–32)
COLOR UR: YELLOW
COPPER: 91 UG/DL
CORTIS SERPL-MCNC: 59.9 UG/DL
CREAT BLD-MCNC: 0.64 MG/DL
CREAT BLD-MCNC: 0.67 MG/DL
CREAT BLD-MCNC: 0.72 MG/DL
CREAT BLD-MCNC: 0.81 MG/DL
CREAT BLD-MCNC: 1.05 MG/DL
CREAT BLD-MCNC: 1.3 MG/DL
CREAT BLD-MCNC: 1.61 MG/DL
D DIMER PPP FEU-MCNC: 2.8 UG/ML FEU (ref ?–0.79)
DEPRECATED HBV CORE AB SER IA-ACNC: 619 NG/ML
DEPRECATED RDW RBC AUTO: 48.9 FL (ref 35.1–46.3)
DEPRECATED RDW RBC AUTO: 49.4 FL (ref 35.1–46.3)
DEPRECATED RDW RBC AUTO: 49.7 FL (ref 35.1–46.3)
DEPRECATED RDW RBC AUTO: 51.2 FL (ref 35.1–46.3)
DEPRECATED RDW RBC AUTO: 51.4 FL (ref 35.1–46.3)
DEPRECATED RDW RBC AUTO: 51.7 FL (ref 35.1–46.3)
DEPRECATED RDW RBC AUTO: 55.2 FL (ref 35.1–46.3)
DSDNA IGG SERPL IA-ACNC: 4.4 IU/ML
EGFRCR SERPLBLD CKD-EPI 2021: 32 ML/MIN/1.73M2 (ref 60–?)
EGFRCR SERPLBLD CKD-EPI 2021: 42 ML/MIN/1.73M2 (ref 60–?)
EGFRCR SERPLBLD CKD-EPI 2021: 54 ML/MIN/1.73M2 (ref 60–?)
EGFRCR SERPLBLD CKD-EPI 2021: 74 ML/MIN/1.73M2 (ref 60–?)
EGFRCR SERPLBLD CKD-EPI 2021: 85 ML/MIN/1.73M2 (ref 60–?)
EGFRCR SERPLBLD CKD-EPI 2021: 89 ML/MIN/1.73M2 (ref 60–?)
EGFRCR SERPLBLD CKD-EPI 2021: 90 ML/MIN/1.73M2 (ref 60–?)
ENA AB SER QL IA: 0.1 UG/L
ENA AB SER QL IA: NEGATIVE
EOSINOPHIL # BLD AUTO: 0 X10(3) UL (ref 0–0.7)
EOSINOPHIL # BLD AUTO: 0.02 X10(3) UL (ref 0–0.7)
EOSINOPHIL # BLD AUTO: 0.03 X10(3) UL (ref 0–0.7)
EOSINOPHIL NFR BLD AUTO: 0 %
EOSINOPHIL NFR BLD AUTO: 0.5 %
EOSINOPHIL NFR BLD AUTO: 0.9 %
ERYTHROCYTE [DISTWIDTH] IN BLOOD BY AUTOMATED COUNT: 16.4 % (ref 11–15)
ERYTHROCYTE [DISTWIDTH] IN BLOOD BY AUTOMATED COUNT: 16.4 % (ref 11–15)
ERYTHROCYTE [DISTWIDTH] IN BLOOD BY AUTOMATED COUNT: 16.9 % (ref 11–15)
ERYTHROCYTE [DISTWIDTH] IN BLOOD BY AUTOMATED COUNT: 17 % (ref 11–15)
ERYTHROCYTE [DISTWIDTH] IN BLOOD BY AUTOMATED COUNT: 17 % (ref 11–15)
ERYTHROCYTE [DISTWIDTH] IN BLOOD BY AUTOMATED COUNT: 17.1 % (ref 11–15)
ERYTHROCYTE [DISTWIDTH] IN BLOOD BY AUTOMATED COUNT: 17.1 % (ref 11–15)
ERYTHROCYTE [SEDIMENTATION RATE] IN BLOOD: 25 MM/HR
ERYTHROCYTE [SEDIMENTATION RATE] IN BLOOD: 41 MM/HR
FOLATE SERPL-MCNC: 17.9 NG/ML (ref 5.4–?)
GAMMA GLOB SERPL ELPH-MCNC: 0.82 G/DL (ref 0.62–1.7)
GLOBULIN PLAS-MCNC: 2.8 G/DL (ref 2–3.5)
GLUCOSE BLD-MCNC: 104 MG/DL (ref 70–99)
GLUCOSE BLD-MCNC: 108 MG/DL (ref 70–99)
GLUCOSE BLD-MCNC: 109 MG/DL (ref 70–99)
GLUCOSE BLD-MCNC: 115 MG/DL (ref 70–99)
GLUCOSE BLD-MCNC: 132 MG/DL (ref 70–99)
GLUCOSE BLD-MCNC: 73 MG/DL (ref 70–99)
GLUCOSE BLD-MCNC: 88 MG/DL (ref 70–99)
GLUCOSE BLDC GLUCOMTR-MCNC: 110 MG/DL (ref 70–99)
GLUCOSE BLDC GLUCOMTR-MCNC: 111 MG/DL (ref 70–99)
GLUCOSE BLDC GLUCOMTR-MCNC: 113 MG/DL (ref 70–99)
GLUCOSE BLDC GLUCOMTR-MCNC: 115 MG/DL (ref 70–99)
GLUCOSE BLDC GLUCOMTR-MCNC: 117 MG/DL (ref 70–99)
GLUCOSE BLDC GLUCOMTR-MCNC: 126 MG/DL (ref 70–99)
GLUCOSE BLDC GLUCOMTR-MCNC: 130 MG/DL (ref 70–99)
GLUCOSE BLDC GLUCOMTR-MCNC: 136 MG/DL (ref 70–99)
GLUCOSE BLDC GLUCOMTR-MCNC: 137 MG/DL (ref 70–99)
GLUCOSE BLDC GLUCOMTR-MCNC: 79 MG/DL (ref 70–99)
GLUCOSE BLDC GLUCOMTR-MCNC: 80 MG/DL (ref 70–99)
GLUCOSE BLDC GLUCOMTR-MCNC: 94 MG/DL (ref 70–99)
GLUCOSE UR-MCNC: NEGATIVE MG/DL
HAPTOGLOB SERPL-MCNC: 209 MG/DL (ref 30–200)
HCO3 BLDA-SCNC: 24.7 MEQ/L (ref 21–27)
HCO3 BLDA-SCNC: 25.9 MEQ/L (ref 21–27)
HCT VFR BLD AUTO: 27.2 %
HCT VFR BLD AUTO: 28.7 %
HCT VFR BLD AUTO: 29.1 %
HCT VFR BLD AUTO: 29.2 %
HCT VFR BLD AUTO: 29.4 %
HCT VFR BLD AUTO: 30.6 %
HCT VFR BLD AUTO: 31.2 %
HEMOCCULT STL QL: NEGATIVE
HGB BLD-MCNC: 10.1 G/DL
HGB BLD-MCNC: 10.4 G/DL
HGB BLD-MCNC: 8.4 G/DL
HGB BLD-MCNC: 8.9 G/DL
HGB BLD-MCNC: 9.4 G/DL
HGB BLD-MCNC: 9.4 G/DL
HGB BLD-MCNC: 9.6 G/DL
IMM GRANULOCYTES # BLD AUTO: 0.02 X10(3) UL (ref 0–1)
IMM GRANULOCYTES # BLD AUTO: 0.02 X10(3) UL (ref 0–1)
IMM GRANULOCYTES # BLD AUTO: 0.03 X10(3) UL (ref 0–1)
IMM GRANULOCYTES # BLD AUTO: 0.05 X10(3) UL (ref 0–1)
IMM GRANULOCYTES # BLD AUTO: 0.05 X10(3) UL (ref 0–1)
IMM GRANULOCYTES # BLD AUTO: 0.08 X10(3) UL (ref 0–1)
IMM GRANULOCYTES # BLD AUTO: 0.09 X10(3) UL (ref 0–1)
IMM GRANULOCYTES NFR BLD: 0.3 %
IMM GRANULOCYTES NFR BLD: 0.4 %
IMM GRANULOCYTES NFR BLD: 0.5 %
IMM GRANULOCYTES NFR BLD: 0.5 %
IMM GRANULOCYTES NFR BLD: 0.6 %
IMM GRANULOCYTES NFR BLD: 0.7 %
IMM GRANULOCYTES NFR BLD: 1.2 %
IRON SATN MFR SERPL: 7 %
IRON SERPL-MCNC: 15 UG/DL
KAPPA LC FREE SER-MCNC: 6.29 MG/DL (ref 0.33–1.94)
KAPPA LC FREE/LAMBDA FREE SER NEPH: 1.21 {RATIO} (ref 0.26–1.65)
KETONES UR-MCNC: NEGATIVE MG/DL
LAMBDA LC FREE SERPL-MCNC: 5.21 MG/DL (ref 0.57–2.63)
LDH SERPL L TO P-CCNC: 217 U/L
LYMPHOCYTES # BLD AUTO: 0.4 X10(3) UL (ref 1–4)
LYMPHOCYTES # BLD AUTO: 0.57 X10(3) UL (ref 1–4)
LYMPHOCYTES # BLD AUTO: 0.58 X10(3) UL (ref 1–4)
LYMPHOCYTES # BLD AUTO: 0.59 X10(3) UL (ref 1–4)
LYMPHOCYTES # BLD AUTO: 0.61 X10(3) UL (ref 1–4)
LYMPHOCYTES # BLD AUTO: 0.73 X10(3) UL (ref 1–4)
LYMPHOCYTES # BLD AUTO: 0.77 X10(3) UL (ref 1–4)
LYMPHOCYTES NFR BLD AUTO: 18.4 %
LYMPHOCYTES NFR BLD AUTO: 18.4 %
LYMPHOCYTES NFR BLD AUTO: 3.9 %
LYMPHOCYTES NFR BLD AUTO: 4.2 %
LYMPHOCYTES NFR BLD AUTO: 5.2 %
LYMPHOCYTES NFR BLD AUTO: 8.7 %
LYMPHOCYTES NFR BLD AUTO: 9.7 %
M PROTEIN 24H UR ELPH-MRATE: 580.8 MG/24 HR (ref ?–149.1)
MAGNESIUM SERPL-MCNC: 1.4 MG/DL (ref 1.6–2.6)
MAGNESIUM SERPL-MCNC: 1.7 MG/DL (ref 1.6–2.6)
MAGNESIUM SERPL-MCNC: 1.7 MG/DL (ref 1.6–2.6)
MAGNESIUM SERPL-MCNC: 2.6 MG/DL (ref 1.6–2.6)
MAGNESIUM SERPL-MCNC: 2.7 MG/DL (ref 1.6–2.6)
MAGNESIUM SERPL-MCNC: 2.7 MG/DL (ref 1.6–2.6)
MCH RBC QN AUTO: 26.9 PG (ref 26–34)
MCH RBC QN AUTO: 27 PG (ref 26–34)
MCH RBC QN AUTO: 27.1 PG (ref 26–34)
MCH RBC QN AUTO: 27.3 PG (ref 26–34)
MCH RBC QN AUTO: 27.6 PG (ref 26–34)
MCHC RBC AUTO-ENTMCNC: 30.5 G/DL (ref 31–37)
MCHC RBC AUTO-ENTMCNC: 30.9 G/DL (ref 31–37)
MCHC RBC AUTO-ENTMCNC: 32 G/DL (ref 31–37)
MCHC RBC AUTO-ENTMCNC: 32.8 G/DL (ref 31–37)
MCHC RBC AUTO-ENTMCNC: 33 G/DL (ref 31–37)
MCHC RBC AUTO-ENTMCNC: 33 G/DL (ref 31–37)
MCHC RBC AUTO-ENTMCNC: 33.3 G/DL (ref 31–37)
MCV RBC AUTO: 81 FL
MCV RBC AUTO: 82 FL
MCV RBC AUTO: 83.4 FL
MCV RBC AUTO: 83.6 FL
MCV RBC AUTO: 85.5 FL
MCV RBC AUTO: 87.2 FL
MCV RBC AUTO: 89.6 FL
MODIFIED ALLEN TEST: POSITIVE
MONOCYTES # BLD AUTO: 0.13 X10(3) UL (ref 0.1–1)
MONOCYTES # BLD AUTO: 0.17 X10(3) UL (ref 0.1–1)
MONOCYTES # BLD AUTO: 0.19 X10(3) UL (ref 0.1–1)
MONOCYTES # BLD AUTO: 0.19 X10(3) UL (ref 0.1–1)
MONOCYTES # BLD AUTO: 0.23 X10(3) UL (ref 0.1–1)
MONOCYTES # BLD AUTO: 0.38 X10(3) UL (ref 0.1–1)
MONOCYTES # BLD AUTO: 0.48 X10(3) UL (ref 0.1–1)
MONOCYTES NFR BLD AUTO: 1.3 %
MONOCYTES NFR BLD AUTO: 2.2 %
MONOCYTES NFR BLD AUTO: 3.2 %
MONOCYTES NFR BLD AUTO: 3.4 %
MONOCYTES NFR BLD AUTO: 4.3 %
MONOCYTES NFR BLD AUTO: 4.5 %
MONOCYTES NFR BLD AUTO: 5.1 %
NEUTROPHILS # BLD AUTO: 10.1 X10 (3) UL (ref 1.5–7.7)
NEUTROPHILS # BLD AUTO: 10.1 X10(3) UL (ref 1.5–7.7)
NEUTROPHILS # BLD AUTO: 13.77 X10 (3) UL (ref 1.5–7.7)
NEUTROPHILS # BLD AUTO: 13.77 X10(3) UL (ref 1.5–7.7)
NEUTROPHILS # BLD AUTO: 16.06 X10 (3) UL (ref 1.5–7.7)
NEUTROPHILS # BLD AUTO: 16.06 X10(3) UL (ref 1.5–7.7)
NEUTROPHILS # BLD AUTO: 2.48 X10 (3) UL (ref 1.5–7.7)
NEUTROPHILS # BLD AUTO: 2.48 X10(3) UL (ref 1.5–7.7)
NEUTROPHILS # BLD AUTO: 3.18 X10 (3) UL (ref 1.5–7.7)
NEUTROPHILS # BLD AUTO: 3.18 X10(3) UL (ref 1.5–7.7)
NEUTROPHILS # BLD AUTO: 3.52 X10 (3) UL (ref 1.5–7.7)
NEUTROPHILS # BLD AUTO: 3.52 X10(3) UL (ref 1.5–7.7)
NEUTROPHILS # BLD AUTO: 5.93 X10 (3) UL (ref 1.5–7.7)
NEUTROPHILS # BLD AUTO: 5.93 X10(3) UL (ref 1.5–7.7)
NEUTROPHILS NFR BLD AUTO: 75 %
NEUTROPHILS NFR BLD AUTO: 75.9 %
NEUTROPHILS NFR BLD AUTO: 85.4 %
NEUTROPHILS NFR BLD AUTO: 87.4 %
NEUTROPHILS NFR BLD AUTO: 89.7 %
NEUTROPHILS NFR BLD AUTO: 92.9 %
NEUTROPHILS NFR BLD AUTO: 94.3 %
NITRITE UR QL STRIP.AUTO: NEGATIVE
O2 CT BLD-SCNC: 11.6 VOL% (ref 15–23)
O2 CT BLD-SCNC: 12.8 VOL% (ref 15–23)
OSMOLALITY SERPL CALC.SUM OF ELEC: 272 MOSM/KG (ref 275–295)
OSMOLALITY SERPL CALC.SUM OF ELEC: 281 MOSM/KG (ref 275–295)
OSMOLALITY SERPL CALC.SUM OF ELEC: 291 MOSM/KG (ref 275–295)
OSMOLALITY SERPL CALC.SUM OF ELEC: 293 MOSM/KG (ref 275–295)
OSMOLALITY SERPL CALC.SUM OF ELEC: 293 MOSM/KG (ref 275–295)
OSMOLALITY SERPL CALC.SUM OF ELEC: 296 MOSM/KG (ref 275–295)
OSMOLALITY SERPL CALC.SUM OF ELEC: 300 MOSM/KG (ref 275–295)
OXYGEN LITERS/MINUTE: 15 L/MIN
OXYGEN LITERS/MINUTE: 5 L/MIN
P AXIS: 37 DEGREES
P AXIS: 50 DEGREES
P-R INTERVAL: 146 MS
P-R INTERVAL: 160 MS
PCO2 BLDA: 31 MM HG (ref 35–45)
PCO2 BLDA: 32 MM HG (ref 35–45)
PH BLDA: 7.47 [PH] (ref 7.35–7.45)
PH BLDA: 7.5 [PH] (ref 7.35–7.45)
PH UR: 7 [PH] (ref 5–8)
PHOSPHATE SERPL-MCNC: 4.2 MG/DL (ref 2.4–5.1)
PHOSPHATE SERPL-MCNC: 5.1 MG/DL (ref 2.4–5.1)
PLATELET # BLD AUTO: 347 10(3)UL (ref 150–450)
PLATELET # BLD AUTO: 363 10(3)UL (ref 150–450)
PLATELET # BLD AUTO: 413 10(3)UL (ref 150–450)
PLATELET # BLD AUTO: 423 10(3)UL (ref 150–450)
PLATELET # BLD AUTO: 429 10(3)UL (ref 150–450)
PLATELET # BLD AUTO: 436 10(3)UL (ref 150–450)
PLATELET # BLD AUTO: 481 10(3)UL (ref 150–450)
PLATELET MORPHOLOGY: NORMAL
PO2 BLDA: 40 MM HG (ref 80–100)
PO2 BLDA: 63 MM HG (ref 80–100)
POTASSIUM SERPL-SCNC: 2.9 MMOL/L (ref 3.5–5.1)
POTASSIUM SERPL-SCNC: 3 MMOL/L (ref 3.5–5.1)
POTASSIUM SERPL-SCNC: 3.5 MMOL/L (ref 3.5–5.1)
POTASSIUM SERPL-SCNC: 3.6 MMOL/L (ref 3.5–5.1)
POTASSIUM SERPL-SCNC: 3.6 MMOL/L (ref 3.5–5.1)
POTASSIUM SERPL-SCNC: 3.9 MMOL/L (ref 3.5–5.1)
POTASSIUM SERPL-SCNC: 4.1 MMOL/L (ref 3.5–5.1)
PROCALCITONIN SERPL-MCNC: 0.27 NG/ML (ref ?–0.05)
PROT SERPL-MCNC: 5.1 G/DL (ref 5.7–8.2)
PROT SERPL-MCNC: 5.2 G/DL (ref 5.7–8.2)
PROT SERPL-MCNC: 5.8 G/DL (ref 5.7–8.2)
PROT UR-MCNC: NEGATIVE MG/DL
PTH-INTACT SERPL-MCNC: <6.3 PG/ML (ref 18.5–88)
PUNCTURE CHARGE: YES
PUNCTURE CHARGE: YES
Q-T INTERVAL: 266 MS
Q-T INTERVAL: 322 MS
QRS DURATION: 66 MS
QRS DURATION: 74 MS
QTC CALCULATION (BEZET): 411 MS
QTC CALCULATION (BEZET): 429 MS
R AXIS: -29 DEGREES
R AXIS: -33 DEGREES
RBC # BLD AUTO: 3.12 X10(6)UL
RBC # BLD AUTO: 3.26 X10(6)UL
RBC # BLD AUTO: 3.44 X10(6)UL
RBC # BLD AUTO: 3.44 X10(6)UL
RBC # BLD AUTO: 3.48 X10(6)UL
RBC # BLD AUTO: 3.73 X10(6)UL
RBC # BLD AUTO: 3.85 X10(6)UL
RHEUMATOID FACT SERPL-ACNC: 7.7 IU/ML (ref ?–14)
SAO2 % BLDA: 88.3 % (ref 94–100)
SAO2 % BLDA: 97.2 % (ref 94–100)
SODIUM SERPL-SCNC: 131 MMOL/L (ref 136–145)
SODIUM SERPL-SCNC: 134 MMOL/L (ref 136–145)
SODIUM SERPL-SCNC: 136 MMOL/L (ref 136–145)
SODIUM SERPL-SCNC: 136 MMOL/L (ref 136–145)
SODIUM SERPL-SCNC: 139 MMOL/L (ref 136–145)
SODIUM SERPL-SCNC: 141 MMOL/L (ref 136–145)
SODIUM SERPL-SCNC: 144 MMOL/L (ref 136–145)
SP GR UR STRIP: 1.01 (ref 1–1.03)
SPECIMEN VOL UR: 2400 ML
T AXIS: 18 DEGREES
T AXIS: 39 DEGREES
T4 FREE SERPL-MCNC: 0.9 NG/DL (ref 0.8–1.7)
TIBC SERPL-MCNC: 225 UG/DL (ref 250–425)
TRANSFERRIN SERPL-MCNC: 151 MG/DL (ref 250–380)
TROPONIN I SERPL HS-MCNC: 9 NG/L
TSI SER-ACNC: 1.72 MIU/ML (ref 0.55–4.78)
UROBILINOGEN UR STRIP-ACNC: 0.2
VENTRICULAR RATE: 107 BPM
VENTRICULAR RATE: 144 BPM
VIT B12 SERPL-MCNC: 582 PG/ML (ref 211–911)
VIT D 1,25 DI-OH: 51 PG/ML
VIT D+METAB SERPL-MCNC: 86.5 NG/ML (ref 30–100)
WBC # BLD AUTO: 11.3 X10(3) UL (ref 4–11)
WBC # BLD AUTO: 14.6 X10(3) UL (ref 4–11)
WBC # BLD AUTO: 17.3 X10(3) UL (ref 4–11)
WBC # BLD AUTO: 3.3 X10(3) UL (ref 4–11)
WBC # BLD AUTO: 4.1 X10(3) UL (ref 4–11)
WBC # BLD AUTO: 4.2 X10(3) UL (ref 4–11)
WBC # BLD AUTO: 6.8 X10(3) UL (ref 4–11)
ZINC: 60 UG/DL

## 2024-01-01 PROCEDURE — 99232 SBSQ HOSP IP/OBS MODERATE 35: CPT | Performed by: INTERNAL MEDICINE

## 2024-01-01 PROCEDURE — 71045 X-RAY EXAM CHEST 1 VIEW: CPT | Performed by: HOSPITALIST

## 2024-01-01 PROCEDURE — 99291 CRITICAL CARE FIRST HOUR: CPT | Performed by: INTERNAL MEDICINE

## 2024-01-01 PROCEDURE — 93306 TTE W/DOPPLER COMPLETE: CPT | Performed by: HOSPITALIST

## 2024-01-01 PROCEDURE — 72141 MRI NECK SPINE W/O DYE: CPT | Performed by: OTHER

## 2024-01-01 PROCEDURE — 99497 ADVNCD CARE PLAN 30 MIN: CPT | Performed by: STUDENT IN AN ORGANIZED HEALTH CARE EDUCATION/TRAINING PROGRAM

## 2024-01-01 PROCEDURE — 71045 X-RAY EXAM CHEST 1 VIEW: CPT | Performed by: INTERNAL MEDICINE

## 2024-01-01 PROCEDURE — 99222 1ST HOSP IP/OBS MODERATE 55: CPT | Performed by: INTERNAL MEDICINE

## 2024-01-01 PROCEDURE — 74176 CT ABD & PELVIS W/O CONTRAST: CPT | Performed by: INTERNAL MEDICINE

## 2024-01-01 PROCEDURE — 99222 1ST HOSP IP/OBS MODERATE 55: CPT | Performed by: HOSPITALIST

## 2024-01-01 PROCEDURE — 99233 SBSQ HOSP IP/OBS HIGH 50: CPT | Performed by: INTERNAL MEDICINE

## 2024-01-01 PROCEDURE — 71045 X-RAY EXAM CHEST 1 VIEW: CPT | Performed by: STUDENT IN AN ORGANIZED HEALTH CARE EDUCATION/TRAINING PROGRAM

## 2024-01-01 PROCEDURE — 99233 SBSQ HOSP IP/OBS HIGH 50: CPT | Performed by: OTHER

## 2024-01-01 PROCEDURE — 99223 1ST HOSP IP/OBS HIGH 75: CPT | Performed by: STUDENT IN AN ORGANIZED HEALTH CARE EDUCATION/TRAINING PROGRAM

## 2024-01-01 PROCEDURE — 71260 CT THORAX DX C+: CPT | Performed by: HOSPITALIST

## 2024-01-01 PROCEDURE — 99291 CRITICAL CARE FIRST HOUR: CPT | Performed by: HOSPITALIST

## 2024-01-01 PROCEDURE — 99233 SBSQ HOSP IP/OBS HIGH 50: CPT | Performed by: HOSPITALIST

## 2024-01-01 PROCEDURE — 76775 US EXAM ABDO BACK WALL LIM: CPT | Performed by: UROLOGY

## 2024-01-01 PROCEDURE — 99231 SBSQ HOSP IP/OBS SF/LOW 25: CPT | Performed by: INTERNAL MEDICINE

## 2024-01-01 PROCEDURE — 5A09357 ASSISTANCE WITH RESPIRATORY VENTILATION, LESS THAN 24 CONSECUTIVE HOURS, CONTINUOUS POSITIVE AIRWAY PRESSURE: ICD-10-PCS | Performed by: INTERNAL MEDICINE

## 2024-01-01 PROCEDURE — 71250 CT THORAX DX C-: CPT | Performed by: INTERNAL MEDICINE

## 2024-01-01 PROCEDURE — 99497 ADVNCD CARE PLAN 30 MIN: CPT | Performed by: INTERNAL MEDICINE

## 2024-01-01 PROCEDURE — 0DH67UZ INSERTION OF FEEDING DEVICE INTO STOMACH, VIA NATURAL OR ARTIFICIAL OPENING: ICD-10-PCS | Performed by: RADIOLOGY

## 2024-01-01 PROCEDURE — 90792 PSYCH DIAG EVAL W/MED SRVCS: CPT | Performed by: OTHER

## 2024-01-01 PROCEDURE — 70551 MRI BRAIN STEM W/O DYE: CPT | Performed by: OTHER

## 2024-01-01 PROCEDURE — 93970 EXTREMITY STUDY: CPT | Performed by: INTERNAL MEDICINE

## 2024-01-01 PROCEDURE — 99233 SBSQ HOSP IP/OBS HIGH 50: CPT | Performed by: STUDENT IN AN ORGANIZED HEALTH CARE EDUCATION/TRAINING PROGRAM

## 2024-01-01 RX ORDER — LACTULOSE 10 G/15ML
30 SOLUTION ORAL ONCE
Status: DISCONTINUED | OUTPATIENT
Start: 2024-01-01 | End: 2024-01-01

## 2024-01-01 RX ORDER — METOPROLOL TARTRATE 1 MG/ML
INJECTION, SOLUTION INTRAVENOUS
Status: DISPENSED
Start: 2024-01-01 | End: 2024-01-01

## 2024-01-01 RX ORDER — ATROPINE SULFATE 10 MG/ML
2 SOLUTION/ DROPS OPHTHALMIC EVERY 2 HOUR PRN
Status: DISCONTINUED | OUTPATIENT
Start: 2024-01-01 | End: 2024-01-01

## 2024-01-01 RX ORDER — VENLAFAXINE 37.5 MG/1
37.5 TABLET ORAL 2 TIMES DAILY
Status: DISCONTINUED | OUTPATIENT
Start: 2024-01-01 | End: 2024-01-01

## 2024-01-01 RX ORDER — HALOPERIDOL 5 MG/ML
1 INJECTION INTRAMUSCULAR
Status: DISCONTINUED | OUTPATIENT
Start: 2024-01-01 | End: 2024-01-01

## 2024-01-01 RX ORDER — PANTOPRAZOLE SODIUM 40 MG/1
40 TABLET, DELAYED RELEASE ORAL
Status: DISCONTINUED | OUTPATIENT
Start: 2024-01-01 | End: 2024-01-01

## 2024-01-01 RX ORDER — POTASSIUM CHLORIDE 14.9 MG/ML
20 INJECTION INTRAVENOUS ONCE
Status: COMPLETED | OUTPATIENT
Start: 2024-01-01 | End: 2024-01-01

## 2024-01-01 RX ORDER — ACETAMINOPHEN 325 MG/1
650 TABLET ORAL EVERY 6 HOURS PRN
Status: DISCONTINUED | OUTPATIENT
Start: 2024-01-01 | End: 2024-01-01

## 2024-01-01 RX ORDER — TRAMADOL HYDROCHLORIDE 50 MG/1
50 TABLET ORAL EVERY 8 HOURS PRN
Qty: 30 TABLET | Refills: 3 | Status: SHIPPED | OUTPATIENT
Start: 2024-01-01 | End: 2024-01-01

## 2024-01-01 RX ORDER — SODIUM CHLORIDE 9 MG/ML
1000 INJECTION, SOLUTION INTRAVENOUS ONCE
Status: DISCONTINUED | OUTPATIENT
Start: 2024-01-01 | End: 2024-01-01

## 2024-01-01 RX ORDER — DEXTROSE MONOHYDRATE 50 MG/ML
INJECTION, SOLUTION INTRAVENOUS CONTINUOUS
Status: DISCONTINUED | OUTPATIENT
Start: 2024-01-01 | End: 2024-01-01

## 2024-01-01 RX ORDER — MELATONIN
100 DAILY
Status: DISCONTINUED | OUTPATIENT
Start: 2024-01-01 | End: 2024-01-01

## 2024-01-01 RX ORDER — IPRATROPIUM BROMIDE AND ALBUTEROL SULFATE 2.5; .5 MG/3ML; MG/3ML
SOLUTION RESPIRATORY (INHALATION)
Status: DISPENSED
Start: 2024-01-01 | End: 2024-01-01

## 2024-01-01 RX ORDER — FUROSEMIDE 10 MG/ML
20 INJECTION INTRAMUSCULAR; INTRAVENOUS ONCE
Status: COMPLETED | OUTPATIENT
Start: 2024-01-01 | End: 2024-01-01

## 2024-01-01 RX ORDER — MAGNESIUM HYDROXIDE/ALUMINUM HYDROXICE/SIMETHICONE 120; 1200; 1200 MG/30ML; MG/30ML; MG/30ML
30 SUSPENSION ORAL 4 TIMES DAILY PRN
Status: DISCONTINUED | OUTPATIENT
Start: 2024-01-01 | End: 2024-01-01

## 2024-01-01 RX ORDER — MAGNESIUM SULFATE HEPTAHYDRATE 40 MG/ML
2 INJECTION, SOLUTION INTRAVENOUS ONCE
Status: COMPLETED | OUTPATIENT
Start: 2024-01-01 | End: 2024-01-01

## 2024-01-01 RX ORDER — MAGNESIUM OXIDE 400 MG/1
400 TABLET ORAL ONCE
Status: COMPLETED | OUTPATIENT
Start: 2024-01-01 | End: 2024-01-01

## 2024-01-01 RX ORDER — LORAZEPAM 2 MG/ML
2 INJECTION INTRAMUSCULAR
Status: DISCONTINUED | OUTPATIENT
Start: 2024-01-01 | End: 2024-01-01

## 2024-01-01 RX ORDER — ARIPIPRAZOLE 2 MG/1
1 TABLET ORAL DAILY
Status: DISCONTINUED | OUTPATIENT
Start: 2024-01-01 | End: 2024-01-01

## 2024-01-01 RX ORDER — METOPROLOL TARTRATE 1 MG/ML
INJECTION, SOLUTION INTRAVENOUS
Status: COMPLETED
Start: 2024-01-01 | End: 2024-01-01

## 2024-01-01 RX ORDER — ACETAMINOPHEN 10 MG/ML
750 INJECTION, SOLUTION INTRAVENOUS EVERY 6 HOURS PRN
Status: DISCONTINUED | OUTPATIENT
Start: 2024-01-01 | End: 2024-01-01

## 2024-01-01 RX ORDER — ESCITALOPRAM OXALATE 5 MG/1
5 TABLET ORAL NIGHTLY
Status: DISCONTINUED | OUTPATIENT
Start: 2024-01-01 | End: 2024-01-01

## 2024-01-01 RX ORDER — ONDANSETRON 2 MG/ML
4 INJECTION INTRAMUSCULAR; INTRAVENOUS EVERY 6 HOURS PRN
Status: DISCONTINUED | OUTPATIENT
Start: 2024-01-01 | End: 2024-01-01

## 2024-01-01 RX ORDER — LORAZEPAM 2 MG/ML
0.5 INJECTION INTRAMUSCULAR
Status: DISCONTINUED | OUTPATIENT
Start: 2024-01-01 | End: 2024-01-01

## 2024-01-01 RX ORDER — GLYCOPYRROLATE 0.2 MG/ML
0.4 INJECTION, SOLUTION INTRAMUSCULAR; INTRAVENOUS EVERY 4 HOURS
Status: DISCONTINUED | OUTPATIENT
Start: 2024-01-01 | End: 2024-01-01

## 2024-01-01 RX ORDER — SODIUM PHOSPHATE, DIBASIC AND SODIUM PHOSPHATE, MONOBASIC 7; 19 G/230ML; G/230ML
1 ENEMA RECTAL ONCE AS NEEDED
Status: COMPLETED | OUTPATIENT
Start: 2024-01-01 | End: 2024-01-01

## 2024-01-01 RX ORDER — HYDROMORPHONE HYDROCHLORIDE 1 MG/ML
1 INJECTION, SOLUTION INTRAMUSCULAR; INTRAVENOUS; SUBCUTANEOUS EVERY 4 HOURS PRN
Status: DISCONTINUED | OUTPATIENT
Start: 2024-01-01 | End: 2024-01-01

## 2024-01-01 RX ORDER — IPRATROPIUM BROMIDE AND ALBUTEROL SULFATE 2.5; .5 MG/3ML; MG/3ML
3 SOLUTION RESPIRATORY (INHALATION) EVERY 6 HOURS PRN
Status: DISCONTINUED | OUTPATIENT
Start: 2024-01-01 | End: 2024-01-01

## 2024-01-01 RX ORDER — HYDROMORPHONE HYDROCHLORIDE 1 MG/ML
0.5 INJECTION, SOLUTION INTRAMUSCULAR; INTRAVENOUS; SUBCUTANEOUS EVERY 2 HOUR PRN
Status: DISCONTINUED | OUTPATIENT
Start: 2024-01-01 | End: 2024-01-01

## 2024-01-01 RX ORDER — LORAZEPAM 2 MG/ML
1 INJECTION INTRAMUSCULAR
Status: DISCONTINUED | OUTPATIENT
Start: 2024-01-01 | End: 2024-01-01

## 2024-01-01 RX ORDER — SODIUM CHLORIDE 0.9 % (FLUSH) 0.9 %
10 SYRINGE (ML) INJECTION AS NEEDED
Status: DISCONTINUED | OUTPATIENT
Start: 2024-01-01 | End: 2024-01-01

## 2024-01-01 RX ORDER — METOPROLOL TARTRATE 25 MG/1
25 TABLET, FILM COATED ORAL
Status: DISCONTINUED | OUTPATIENT
Start: 2024-01-01 | End: 2024-01-01

## 2024-01-01 RX ORDER — HEPARIN SODIUM 5000 [USP'U]/ML
5000 INJECTION, SOLUTION INTRAVENOUS; SUBCUTANEOUS EVERY 12 HOURS SCHEDULED
Status: DISCONTINUED | OUTPATIENT
Start: 2024-01-01 | End: 2024-01-01

## 2024-01-01 RX ORDER — LORAZEPAM 2 MG/ML
INJECTION INTRAMUSCULAR
Status: COMPLETED
Start: 2024-01-01 | End: 2024-01-01

## 2024-01-01 RX ORDER — IPRATROPIUM BROMIDE AND ALBUTEROL SULFATE 2.5; .5 MG/3ML; MG/3ML
3 SOLUTION RESPIRATORY (INHALATION)
Status: DISCONTINUED | OUTPATIENT
Start: 2024-01-01 | End: 2024-01-01

## 2024-01-01 RX ORDER — OLANZAPINE 2.5 MG/1
2.5 TABLET, FILM COATED ORAL NIGHTLY
Status: DISCONTINUED | OUTPATIENT
Start: 2024-01-01 | End: 2024-01-01

## 2024-01-01 RX ORDER — MAGNESIUM OXIDE 400 MG/1
800 TABLET ORAL ONCE
Status: COMPLETED | OUTPATIENT
Start: 2024-01-01 | End: 2024-01-01

## 2024-01-01 RX ORDER — SODIUM CHLORIDE FOR INHALATION 3 %
4 VIAL, NEBULIZER (ML) INHALATION
Status: DISCONTINUED | OUTPATIENT
Start: 2024-01-01 | End: 2024-01-01

## 2024-01-01 RX ORDER — METOPROLOL TARTRATE 1 MG/ML
2.5 INJECTION, SOLUTION INTRAVENOUS ONCE
Status: COMPLETED | OUTPATIENT
Start: 2024-01-01 | End: 2024-01-01

## 2024-01-01 RX ORDER — BISACODYL 10 MG
10 SUPPOSITORY, RECTAL RECTAL
Status: DISCONTINUED | OUTPATIENT
Start: 2024-01-01 | End: 2024-01-01

## 2024-01-01 RX ORDER — FUROSEMIDE 10 MG/ML
40 INJECTION INTRAMUSCULAR; INTRAVENOUS EVERY 8 HOURS PRN
Status: DISCONTINUED | OUTPATIENT
Start: 2024-01-01 | End: 2024-01-01

## 2024-01-01 RX ORDER — METOPROLOL TARTRATE 1 MG/ML
5 INJECTION, SOLUTION INTRAVENOUS EVERY 4 HOURS PRN
Status: DISCONTINUED | OUTPATIENT
Start: 2024-01-01 | End: 2024-01-01

## 2024-01-01 RX ORDER — SODIUM CHLORIDE 9 MG/ML
INJECTION, SOLUTION INTRAVENOUS CONTINUOUS
Status: DISCONTINUED | OUTPATIENT
Start: 2024-01-01 | End: 2024-01-01

## 2024-01-01 RX ORDER — LORAZEPAM 2 MG/ML
0.5 INJECTION INTRAMUSCULAR ONCE
Status: COMPLETED | OUTPATIENT
Start: 2024-01-01 | End: 2024-01-01

## 2024-01-01 RX ORDER — SCOLOPAMINE TRANSDERMAL SYSTEM 1 MG/1
1 PATCH, EXTENDED RELEASE TRANSDERMAL
Status: DISCONTINUED | OUTPATIENT
Start: 2024-01-01 | End: 2024-01-01

## 2024-01-01 RX ORDER — SODIUM BICARBONATE 650 MG/1
325 TABLET ORAL AS NEEDED
Status: DISCONTINUED | OUTPATIENT
Start: 2024-01-01 | End: 2024-01-01

## 2024-01-01 RX ORDER — HYDRALAZINE HYDROCHLORIDE 20 MG/ML
10 INJECTION INTRAMUSCULAR; INTRAVENOUS EVERY 4 HOURS PRN
Status: DISCONTINUED | OUTPATIENT
Start: 2024-01-01 | End: 2024-01-01

## 2024-01-01 RX ORDER — SCOLOPAMINE TRANSDERMAL SYSTEM 1 MG/1
1 PATCH, EXTENDED RELEASE TRANSDERMAL
Status: CANCELLED | OUTPATIENT
Start: 2024-01-01

## 2024-01-01 RX ORDER — BISACODYL 10 MG
10 SUPPOSITORY, RECTAL RECTAL EVERY 12 HOURS PRN
Status: DISCONTINUED | OUTPATIENT
Start: 2024-01-01 | End: 2024-01-01

## 2024-01-01 RX ORDER — ESCITALOPRAM OXALATE 10 MG/1
10 TABLET ORAL NIGHTLY
Status: DISCONTINUED | OUTPATIENT
Start: 2024-01-01 | End: 2024-01-01

## 2024-01-01 RX ORDER — TRAMADOL HYDROCHLORIDE 50 MG/1
50 TABLET ORAL EVERY 8 HOURS PRN
Status: DISCONTINUED | OUTPATIENT
Start: 2024-01-01 | End: 2024-01-01

## 2024-01-01 RX ORDER — CLONAZEPAM 0.25 MG/1
0.25 TABLET, ORALLY DISINTEGRATING ORAL 2 TIMES DAILY PRN
Status: DISCONTINUED | OUTPATIENT
Start: 2024-01-01 | End: 2024-01-01

## 2024-01-01 RX ORDER — METOPROLOL TARTRATE 1 MG/ML
5 INJECTION, SOLUTION INTRAVENOUS EVERY 4 HOURS PRN
Status: COMPLETED | OUTPATIENT
Start: 2024-01-01 | End: 2024-01-01

## 2024-01-01 RX ORDER — HALOPERIDOL 5 MG/ML
2 INJECTION INTRAMUSCULAR
Status: DISCONTINUED | OUTPATIENT
Start: 2024-01-01 | End: 2024-01-01

## 2024-01-30 ENCOUNTER — OFFICE VISIT (OUTPATIENT)
Dept: INTERNAL MEDICINE CLINIC | Facility: CLINIC | Age: 79
End: 2024-01-30

## 2024-01-30 VITALS
TEMPERATURE: 98 F | WEIGHT: 120 LBS | BODY MASS INDEX: 22.66 KG/M2 | HEART RATE: 98 BPM | OXYGEN SATURATION: 97 % | SYSTOLIC BLOOD PRESSURE: 122 MMHG | HEIGHT: 61 IN | DIASTOLIC BLOOD PRESSURE: 70 MMHG

## 2024-01-30 DIAGNOSIS — E78.00 HYPERCHOLESTEROLEMIA: ICD-10-CM

## 2024-01-30 DIAGNOSIS — I10 ESSENTIAL HYPERTENSION: ICD-10-CM

## 2024-01-30 DIAGNOSIS — Z00.00 ENCOUNTER FOR ANNUAL WELLNESS EXAM IN MEDICARE PATIENT: Primary | ICD-10-CM

## 2024-01-30 DIAGNOSIS — M35.3 POLYMYALGIA RHEUMATICA (HCC): ICD-10-CM

## 2024-01-30 PROCEDURE — G0439 PPPS, SUBSEQ VISIT: HCPCS | Performed by: INTERNAL MEDICINE

## 2024-01-30 PROCEDURE — 1126F AMNT PAIN NOTED NONE PRSNT: CPT | Performed by: INTERNAL MEDICINE

## 2024-01-30 PROCEDURE — 99214 OFFICE O/P EST MOD 30 MIN: CPT | Performed by: INTERNAL MEDICINE

## 2024-01-30 NOTE — PROGRESS NOTES
Julissa Ferro is a 78 year old female.  HPI:   She Is here for annual  wellness exam.    She has been stable but is now stressed and tired - taking car of  who has had recent fracture of his knee.     Polymyalgia rheumatica - she has completed prednisone and dong well.     She has lost about 25 lbs - states her appetite is good and no alarm sx. May be due to recent stress regarding .       SR: no chest pain or sob, no gu or gi sx.    BP Readings from Last 6 Encounters:   01/30/24 122/70   11/08/23 148/84   10/18/23 130/78   09/27/23 144/84   06/13/23 142/80   12/12/22 160/86     Wt Readings from Last 6 Encounters:   01/30/24 120 lb (54.4 kg)   11/08/23 126 lb (57.2 kg)   10/18/23 138 lb (62.6 kg)   09/27/23 138 lb 6.4 oz (62.8 kg)   06/13/23 146 lb (66.2 kg)   12/12/22 144 lb 12.8 oz (65.7 kg)     Current Outpatient Medications   Medication Sig Dispense Refill    ENALAPRIL 20 MG Oral Tab TAKE 1 TABLET(20 MG) BY MOUTH DAILY 90 tablet 3    hydroCHLOROthiazide 12.5 MG Oral Tab Take 1 tablet (12.5 mg total) by mouth daily. 90 tablet 3    Boswellia Gee (BOSWELLIA OR) Take by mouth daily.      Calcium Carbonate-Vitamin D (OSCAL 500/200 D-3 OR) Take 1 tablet by mouth daily.      B Complex-C (SUPER B COMPLEX OR) Take 1 tablet by mouth daily.      Multiple Vitamins-Minerals (CENTRUM SILVER) Oral Tab Take 1 tablet by mouth daily.        Past Medical History:   Diagnosis Date    Fracture dislocation of left shoulder joint     w/ nerve injury 2009    Injury of nerve, shoulder or arm     w/ dislocation fx of L shoulder 2009    Nerve injury     nerve damage left hand 2009      Social History:  Social History     Socioeconomic History    Marital status:    Tobacco Use    Smoking status: Never    Smokeless tobacco: Never   Vaping Use    Vaping Use: Never used   Substance and Sexual Activity    Alcohol use: Yes     Alcohol/week: 0.0 standard drinks of alcohol     Comment: rarely. 3 drinks a year    Drug  use: No   Other Topics Concern    Caffeine Concern Yes     Comment: Coffee 2 cups daily        REVIEW OF SYSTEMS:   GENERAL HEALTH: feels well otherwise  SKIN: denies any unusual skin lesions or rashes  RESPIRATORY: denies shortness of breath with exertion  CARDIOVASCULAR: denies chest pain on exertion  GI: denies abdominal pain and denies heartburn  NEURO: denies headaches    EXAM:   /70 (BP Location: Right arm, Patient Position: Sitting, Cuff Size: adult)   Pulse 98   Temp 98.2 °F (36.8 °C) (Oral)   Ht 5' 1\" (1.549 m)   Wt 120 lb (54.4 kg)   SpO2 97%   BMI 22.67 kg/m²   GENERAL: well developed, well nourished,in no apparent distress  SKIN: no rashes,no suspicious lesions  HEENT: atraumatic, normocephalic,ears and throat are clear  NECK: supple,no adenopathy,no bruits  LUNGS: clear to auscultation  CARDIO: RRR without murmur  GI: good BS's,no masses, HSM or tenderness  EXTREMITIES: no cyanosis, clubbing or edema    Breasts: no masses, no axillary adenopathy       General Health     In the past six months, have you lost more than 10 pounds without trying?: 1 - Yes    Has your appetite been poor?: Yes    Type of Diet: Balanced    How does the patient maintain a good energy level?: Stretching    How would you describe your daily physical activity?: Light    How would you describe your current health state?: Fair    How do you maintain positive mental well-being?: Social Interaction;Visiting Friends;Visiting Family         Have you had any immunizations at another office such as Influenza, Hepatitis B, Tetanus, or Pneumococcal?: No     Functional Ability     Bathing or Showering: Able without help    Toileting: Able without help    Dressing: Able without help    Eating: Able without help    Driving: Able without help    Preparing your meals: Able without help    Managing money/bills: Able without help    Taking medications as prescribed: Able without help    Are you able to afford your medications?:  Yes    Hearing Problems?: No     Functional Status     Hearing Problems?: No    Vision Problems? : No    Difficulty walking?: No    Difficulty dressing or bathing?: No    Problems with daily activities? : No    Memory Problems?: No      Fall/Risk Assessment                                                              Depression Screening (PHQ-2/PHQ-9): Over the LAST 2 WEEKS                      Advance Directives     Do you have a healthcare power of ?: Yes    Do you have a living will?: Yes     Hearing Assessment (Required for AWV/SWV)      Hearing Screening    Screening Method: Whisper Test  Whisper Test Result: Pass               Visual Acuity                   Cognitive Assessment     What day of the week is this?: Correct    What month is it?: Correct    What year is it?: Correct    Recall \"Ball\": Correct    Recall \"Flag\": Correct    Recall \"Tree\": Correct        Julissa Ferro's SCREENING SCHEDULE   Tests on this list are recommended by your physician but may not be covered, or covered at this frequency, by your insurer. Please check with your insurance carrier before scheduling to verify coverage.   PREVENTATIVE SERVICES  INDICATIONS AND SCHEDULE Internal Lab or Procedure External Lab or Procedure   Diabetes Screening      HbgA1C   Annually No results found for: \"A1C\"      No data to display                Fasting Blood Sugar (FSB)Annually Glucose (mg/dL)   Date Value   11/02/2023 109 (H)         No data to display               Cardiovascular Disease Screening     LDL Annually LDL Cholesterol (mg/dL)   Date Value   11/02/2023 75        EKG One Time yes    Colorectal Cancer Screening      Colonoscopy Screen every 10 years Health Maintenance   Topic Date Due    Colorectal Cancer Screening  08/10/2022    Update Health Maintenance if applicable    Flex Sigmoidoscopy Screen every 5 years No results found for this or any previous visit.      No data to display                 Fecal Occult Blood Annually No  results found for: \"FOB\", \"OCCULTSTOOL\"      No data to display                Glaucoma Screening      Ophthalmology Visit Annually Yes     Bone Density Screening      Dexascan Every two years Last Dexa Scan:    XR DEXA BONE DENSITOMETRY (CPT=77080) 08/29/2022        No data to display               Pap and Pelvic      Pap: Every 3 yrs age 21-65 or Pap+HPV every 5 yrs age 30-65, age 65 and older at high risk   No recommendations at this time Update Health Maintenance if applicable    Chlamydia  Annually if high risk No results found for: \"CHLAMYDIA\"      No data to display                Screening Mammogram      Mammogram  Annually No recommendations at this time Update Health Maintenance if applicable   Immunizations      Influenza No orders found for this or any previous visit. Update Immunization Activity if applicable    Pneumococcal Orders placed or performed in visit on 12/15/14    PNEUMOCOCCAL VACC, 13 CALISTA IM    Update Immunization Activity if applicable    Hepatitis B No orders found for this or any previous visit. Update Immunization Activity if applicable    Tetanus No orders found for this or any previous visit. Update Immunization Activity if applicable    Zoster (Not covered by Medicare Part B) No orders found for this or any previous visit. Update Immunization Activity if applicable     SPECIFIC DISEASE MONITORING Internal Lab or Procedure External Lab or Procedure   Annual Monitoring of Persistent     Medications (ACE/ARB, digoxin, diuretics)    Potassium  Annually Potassium (mmol/L)   Date Value   11/02/2023 3.8     POTASSIUM (P) (mmol/L)   Date Value   06/09/2016 4.5         No data to display                Creatinine  Annually Creatinine (mg/dL)   Date Value   11/02/2023 0.60         No data to display                Digoxin Serum Conc  Annually No results found for: \"DIGOXIN\"      No data to display                Diabetes      HgbA1C  Annually No results found for: \"A1C\"      No data to display                 Creat/alb ratio  Annually      LDL  Annually LDL Cholesterol (mg/dL)   Date Value   11/02/2023 75         No data to display                 Dilated Eye exam  Annually      No data to display                   No data to display                COPD      Spirometry Testing Annually No results found for this or any previous visit.      No data to display                       ASSESSMENT AND PLAN:   1. Encounter for annual wellness exam in Medicare patient  Her examination is stable and she has no alarm sx but need to keep an eye on her weight which is down about 25 lbs but she has been under significant stress regarding caring for      Labs ordered as below     2. Essential hypertension    - CBC W Differential W Platelet [E]; Future  - Comp Metabolic Panel (14) [E]; Future  - Lipid Panel; Future  - TSH and Free T4 [E]; Future  - Sed Rate, Westergren (Automated); Future  - C-Reactive Protein [E]; Future    3. Hypercholesterolemia    - CBC W Differential W Platelet [E]; Future  - Comp Metabolic Panel (14) [E]; Future  - Lipid Panel; Future  - TSH and Free T4 [E]; Future  - Sed Rate, Westergren (Automated); Future  - C-Reactive Protein [E]; Future    4. Polymyalgia rheumatica (HCC)   She has been onb tapering doses of steroids over about 5 mo and is now off.     - CBC W Differential W Platelet [E]; Future  - Comp Metabolic Panel (14) [E]; Future  - Lipid Panel; Future  - TSH and Free T4 [E]; Future  - Sed Rate, Westergren (Automated); Future  - C-Reactive Protein [E]; Future    The patient indicates understanding of these issues and agrees to the plan.  The patient is asked to return in 2 mo

## 2024-01-31 ENCOUNTER — LAB ENCOUNTER (OUTPATIENT)
Dept: LAB | Age: 79
End: 2024-01-31
Attending: INTERNAL MEDICINE
Payer: MEDICARE

## 2024-01-31 DIAGNOSIS — E78.00 HYPERCHOLESTEROLEMIA: ICD-10-CM

## 2024-01-31 DIAGNOSIS — M35.3 POLYMYALGIA RHEUMATICA (HCC): ICD-10-CM

## 2024-01-31 DIAGNOSIS — I10 ESSENTIAL HYPERTENSION: ICD-10-CM

## 2024-01-31 LAB
ALBUMIN SERPL-MCNC: 3.6 G/DL (ref 3.2–4.8)
ALBUMIN/GLOB SERPL: 1.5 {RATIO} (ref 1–2)
ALP LIVER SERPL-CCNC: 115 U/L
ALT SERPL-CCNC: <7 U/L
ANION GAP SERPL CALC-SCNC: 6 MMOL/L (ref 0–18)
AST SERPL-CCNC: 20 U/L (ref ?–34)
BASOPHILS # BLD AUTO: 0.11 X10(3) UL (ref 0–0.2)
BASOPHILS NFR BLD AUTO: 1.4 %
BILIRUB SERPL-MCNC: 0.4 MG/DL (ref 0.2–1.1)
BUN BLD-MCNC: 14 MG/DL (ref 9–23)
BUN/CREAT SERPL: 17.7 (ref 10–20)
CALCIUM BLD-MCNC: 8.9 MG/DL (ref 8.7–10.4)
CHLORIDE SERPL-SCNC: 103 MMOL/L (ref 98–112)
CHOLEST SERPL-MCNC: 163 MG/DL (ref ?–200)
CO2 SERPL-SCNC: 27 MMOL/L (ref 21–32)
CREAT BLD-MCNC: 0.79 MG/DL
CRP SERPL-MCNC: 4.6 MG/DL (ref ?–1)
DEPRECATED RDW RBC AUTO: 46.3 FL (ref 35.1–46.3)
EGFRCR SERPLBLD CKD-EPI 2021: 77 ML/MIN/1.73M2 (ref 60–?)
EOSINOPHIL # BLD AUTO: 0.27 X10(3) UL (ref 0–0.7)
EOSINOPHIL NFR BLD AUTO: 3.5 %
ERYTHROCYTE [DISTWIDTH] IN BLOOD BY AUTOMATED COUNT: 14.2 % (ref 11–15)
ERYTHROCYTE [SEDIMENTATION RATE] IN BLOOD: 34 MM/HR
FASTING PATIENT LIPID ANSWER: YES
FASTING STATUS PATIENT QL REPORTED: YES
GLOBULIN PLAS-MCNC: 2.4 G/DL (ref 2.8–4.4)
GLUCOSE BLD-MCNC: 98 MG/DL (ref 70–99)
HCT VFR BLD AUTO: 32.8 %
HDLC SERPL-MCNC: 24 MG/DL (ref 40–59)
HGB BLD-MCNC: 10.7 G/DL
IMM GRANULOCYTES # BLD AUTO: 0.11 X10(3) UL (ref 0–1)
IMM GRANULOCYTES NFR BLD: 1.4 %
LDLC SERPL CALC-MCNC: 114 MG/DL (ref ?–100)
LYMPHOCYTES # BLD AUTO: 1.93 X10(3) UL (ref 1–4)
LYMPHOCYTES NFR BLD AUTO: 25.1 %
MCH RBC QN AUTO: 29.2 PG (ref 26–34)
MCHC RBC AUTO-ENTMCNC: 32.6 G/DL (ref 31–37)
MCV RBC AUTO: 89.4 FL
MONOCYTES # BLD AUTO: 0.63 X10(3) UL (ref 0.1–1)
MONOCYTES NFR BLD AUTO: 8.2 %
NEUTROPHILS # BLD AUTO: 4.65 X10 (3) UL (ref 1.5–7.7)
NEUTROPHILS # BLD AUTO: 4.65 X10(3) UL (ref 1.5–7.7)
NEUTROPHILS NFR BLD AUTO: 60.4 %
NONHDLC SERPL-MCNC: 139 MG/DL (ref ?–130)
OSMOLALITY SERPL CALC.SUM OF ELEC: 282 MOSM/KG (ref 275–295)
PLATELET # BLD AUTO: 394 10(3)UL (ref 150–450)
POTASSIUM SERPL-SCNC: 4 MMOL/L (ref 3.5–5.1)
PROT SERPL-MCNC: 6 G/DL (ref 5.7–8.2)
RBC # BLD AUTO: 3.67 X10(6)UL
SODIUM SERPL-SCNC: 136 MMOL/L (ref 136–145)
T4 FREE SERPL-MCNC: 1.2 NG/DL (ref 0.8–1.7)
TRIGL SERPL-MCNC: 138 MG/DL (ref 30–149)
TSI SER-ACNC: 4.15 MIU/ML (ref 0.55–4.78)
VLDLC SERPL CALC-MCNC: 24 MG/DL (ref 0–30)
WBC # BLD AUTO: 7.7 X10(3) UL (ref 4–11)

## 2024-01-31 PROCEDURE — 36415 COLL VENOUS BLD VENIPUNCTURE: CPT

## 2024-01-31 PROCEDURE — 85652 RBC SED RATE AUTOMATED: CPT

## 2024-01-31 PROCEDURE — 85025 COMPLETE CBC W/AUTO DIFF WBC: CPT

## 2024-01-31 PROCEDURE — 86140 C-REACTIVE PROTEIN: CPT

## 2024-01-31 PROCEDURE — 84443 ASSAY THYROID STIM HORMONE: CPT

## 2024-01-31 PROCEDURE — 80053 COMPREHEN METABOLIC PANEL: CPT

## 2024-01-31 PROCEDURE — 80061 LIPID PANEL: CPT

## 2024-01-31 PROCEDURE — 84439 ASSAY OF FREE THYROXINE: CPT

## 2024-02-01 ENCOUNTER — TELEPHONE (OUTPATIENT)
Dept: INTERNAL MEDICINE CLINIC | Facility: CLINIC | Age: 79
End: 2024-02-01

## 2024-02-01 DIAGNOSIS — D64.9 ANEMIA, UNSPECIFIED TYPE: ICD-10-CM

## 2024-02-01 DIAGNOSIS — M35.3 POLYMYALGIA RHEUMATICA (HCC): Primary | ICD-10-CM

## 2024-02-01 DIAGNOSIS — I10 ESSENTIAL HYPERTENSION: ICD-10-CM

## 2024-02-01 NOTE — TELEPHONE ENCOUNTER
Please call pt with lab results - [phone]     Patient asks on call  to review blood test results  Hoping to hear back today

## 2024-02-02 NOTE — TELEPHONE ENCOUNTER
Telephone call to the patient to let her know that her blood test have turned out pretty similar to where they have been before.  Specifically her thyroid test is normal as she was concerned about fatigue.  She I did notice her hemoglobin had dropped slightly to 10.7 but that is still similar to where it has been running.  Her other labs are also similar to where they have been before.  I told the patient Dr. Kirkland reviewed the labs and get back in touch with her.  I will forward this to Dr. Kirkland as an FYI.

## 2024-02-09 ENCOUNTER — TELEPHONE (OUTPATIENT)
Dept: INTERNAL MEDICINE CLINIC | Facility: CLINIC | Age: 79
End: 2024-02-09

## 2024-02-12 NOTE — TELEPHONE ENCOUNTER
I did review lebs, all OK other than Hb down a little.     Repeat CBC, iron and TIBC and ferritin, retic count in about 1 month and see me a few days later.

## 2024-03-13 ENCOUNTER — LAB ENCOUNTER (OUTPATIENT)
Dept: LAB | Age: 79
End: 2024-03-13
Attending: INTERNAL MEDICINE
Payer: MEDICARE

## 2024-03-13 ENCOUNTER — TELEPHONE (OUTPATIENT)
Dept: INTERNAL MEDICINE CLINIC | Facility: CLINIC | Age: 79
End: 2024-03-13

## 2024-03-13 DIAGNOSIS — R53.83 FATIGUE, UNSPECIFIED TYPE: ICD-10-CM

## 2024-03-13 DIAGNOSIS — D64.9 ANEMIA, UNSPECIFIED TYPE: ICD-10-CM

## 2024-03-13 DIAGNOSIS — M35.3 POLYMYALGIA RHEUMATICA (HCC): ICD-10-CM

## 2024-03-13 DIAGNOSIS — I10 ESSENTIAL HYPERTENSION: ICD-10-CM

## 2024-03-13 DIAGNOSIS — R53.83 FATIGUE, UNSPECIFIED TYPE: Primary | ICD-10-CM

## 2024-03-13 LAB
ALBUMIN SERPL-MCNC: 3.8 G/DL (ref 3.2–4.8)
ALBUMIN/GLOB SERPL: 1.5 {RATIO} (ref 1–2)
ALP LIVER SERPL-CCNC: 146 U/L
ALT SERPL-CCNC: 13 U/L
ANION GAP SERPL CALC-SCNC: 6 MMOL/L (ref 0–18)
AST SERPL-CCNC: 28 U/L (ref ?–34)
BASOPHILS # BLD AUTO: 0.09 X10(3) UL (ref 0–0.2)
BASOPHILS NFR BLD AUTO: 1 %
BILIRUB SERPL-MCNC: 0.4 MG/DL (ref 0.2–1.1)
BUN BLD-MCNC: 16 MG/DL (ref 9–23)
BUN/CREAT SERPL: 21.6 (ref 10–20)
CALCIUM BLD-MCNC: 9.6 MG/DL (ref 8.7–10.4)
CHLORIDE SERPL-SCNC: 104 MMOL/L (ref 98–112)
CO2 SERPL-SCNC: 29 MMOL/L (ref 21–32)
CREAT BLD-MCNC: 0.74 MG/DL
DEPRECATED HBV CORE AB SER IA-ACNC: 333.6 NG/ML
DEPRECATED RDW RBC AUTO: 46.2 FL (ref 35.1–46.3)
EGFRCR SERPLBLD CKD-EPI 2021: 83 ML/MIN/1.73M2 (ref 60–?)
EOSINOPHIL # BLD AUTO: 0.12 X10(3) UL (ref 0–0.7)
EOSINOPHIL NFR BLD AUTO: 1.3 %
ERYTHROCYTE [DISTWIDTH] IN BLOOD BY AUTOMATED COUNT: 14.4 % (ref 11–15)
ERYTHROCYTE [SEDIMENTATION RATE] IN BLOOD: 42 MM/HR
FASTING STATUS PATIENT QL REPORTED: NO
GLOBULIN PLAS-MCNC: 2.6 G/DL (ref 2.8–4.4)
GLUCOSE BLD-MCNC: 97 MG/DL (ref 70–99)
HCT VFR BLD AUTO: 36.8 %
HGB BLD-MCNC: 12 G/DL
HGB RETIC QN AUTO: 31.3 PG (ref 28.2–36.6)
IMM GRANULOCYTES # BLD AUTO: 0.03 X10(3) UL (ref 0–1)
IMM GRANULOCYTES NFR BLD: 0.3 %
IMM RETICS NFR: 0.14 RATIO (ref 0.1–0.3)
IRON SATN MFR SERPL: 15 %
IRON SERPL-MCNC: 40 UG/DL
LYMPHOCYTES # BLD AUTO: 1.83 X10(3) UL (ref 1–4)
LYMPHOCYTES NFR BLD AUTO: 20.2 %
MCH RBC QN AUTO: 29 PG (ref 26–34)
MCHC RBC AUTO-ENTMCNC: 32.6 G/DL (ref 31–37)
MCV RBC AUTO: 88.9 FL
MONOCYTES # BLD AUTO: 0.69 X10(3) UL (ref 0.1–1)
MONOCYTES NFR BLD AUTO: 7.6 %
NEUTROPHILS # BLD AUTO: 6.32 X10 (3) UL (ref 1.5–7.7)
NEUTROPHILS # BLD AUTO: 6.32 X10(3) UL (ref 1.5–7.7)
NEUTROPHILS NFR BLD AUTO: 69.6 %
OSMOLALITY SERPL CALC.SUM OF ELEC: 289 MOSM/KG (ref 275–295)
PLATELET # BLD AUTO: 353 10(3)UL (ref 150–450)
POTASSIUM SERPL-SCNC: 3.9 MMOL/L (ref 3.5–5.1)
PROT SERPL-MCNC: 6.4 G/DL (ref 5.7–8.2)
RBC # BLD AUTO: 4.14 X10(6)UL
RETICS # AUTO: 65.8 X10(3) UL (ref 22.5–147.5)
RETICS/RBC NFR AUTO: 1.6 %
SODIUM SERPL-SCNC: 139 MMOL/L (ref 136–145)
T4 FREE SERPL-MCNC: 1.1 NG/DL (ref 0.8–1.7)
TIBC SERPL-MCNC: 259 UG/DL (ref 250–425)
TRANSFERRIN SERPL-MCNC: 174 MG/DL (ref 250–380)
TSI SER-ACNC: 3.25 MIU/ML (ref 0.55–4.78)
WBC # BLD AUTO: 9.1 X10(3) UL (ref 4–11)

## 2024-03-13 PROCEDURE — 36415 COLL VENOUS BLD VENIPUNCTURE: CPT

## 2024-03-13 PROCEDURE — 84466 ASSAY OF TRANSFERRIN: CPT

## 2024-03-13 PROCEDURE — 85045 AUTOMATED RETICULOCYTE COUNT: CPT

## 2024-03-13 PROCEDURE — 84443 ASSAY THYROID STIM HORMONE: CPT

## 2024-03-13 PROCEDURE — 82728 ASSAY OF FERRITIN: CPT

## 2024-03-13 PROCEDURE — 80053 COMPREHEN METABOLIC PANEL: CPT

## 2024-03-13 PROCEDURE — 84439 ASSAY OF FREE THYROXINE: CPT

## 2024-03-13 PROCEDURE — 85025 COMPLETE CBC W/AUTO DIFF WBC: CPT

## 2024-03-13 PROCEDURE — 83540 ASSAY OF IRON: CPT

## 2024-03-13 PROCEDURE — 85652 RBC SED RATE AUTOMATED: CPT

## 2024-03-21 ENCOUNTER — TELEPHONE (OUTPATIENT)
Dept: INTERNAL MEDICINE CLINIC | Facility: CLINIC | Age: 79
End: 2024-03-21

## 2024-03-21 RX ORDER — PREDNISONE 10 MG/1
10 TABLET ORAL AS DIRECTED
Qty: 100 TABLET | Refills: 0 | Status: SHIPPED | OUTPATIENT
Start: 2024-03-21

## 2024-03-21 NOTE — TELEPHONE ENCOUNTER
LABS GOOD BUT INFLAMMATION TEST IS HIGH (SED RATE)     Start prednisone 10 mg, # 100 - take three daily for 2 weeks then two daily until I see her.  Refill prn

## 2024-03-21 NOTE — TELEPHONE ENCOUNTER
S/w patient and relayed MD message.  Verbalizes understanding and agreement with tx. Plan. Rx placed for prednisone Rx as per Dr JAIN request.    Medication Quantity Refills Start End   predniSONE 10 MG Oral Tab 100 tablet 0 3/21/2024 --   Sig:   Take 1 tablet (10 mg total) by mouth As Directed.     Route:   Oral     Note to Pharmacy:   Start prednisone 10 mg, # 100 - take three daily for 2 weeks then two daily until I see her.  Refill prn     Order #:   [id number]

## 2024-03-25 NOTE — TELEPHONE ENCOUNTER
Spoke to pt and  on call---  pt is reporting redness on entire back and upper chest area;   skin on the back is \"peeling\";  no pustules/ blisters/ pimples;   states skin is warm to the touch;  pt reports it is still itchy;  pt denies fevers or other symptoms  Dr. VO recommendation is to be seen at urgent care;   offered pt appt with  3/26;   pt declines both;   I advised pt the symptom of skin peeling can be very serious and lead to serious complications/infections and pt should be examined today.  Pt acknowledged and declines to be seen.   She will wait for Dr.C Dr.C DONNA Friedman PSR came back after and stated pt called and scheduled with DR. JAIN for 3/26

## 2024-03-25 NOTE — TELEPHONE ENCOUNTER
Please call patient  She started Prednisone Friday  (2 pills-one at lunch, one a dinner) and had a bad allergic reaction  She had hives on her back  Patient took previously and did not have a reaction  Please call to advise  Tasked to nursing

## 2024-03-25 NOTE — TELEPHONE ENCOUNTER
To Liv     Please advise     Spoke to patient, stated she was started on Prednisone on 3/21. Reports that after two doses she began with redness and itching on her back, chest and arms. Stated she took prednisone last November and tolerated well. After reaction stated she called [facility] and they recommended benadryl lotion for itching. Has been using with some relief but still experiencing redness and itching. States her skin is very dry and peeling.

## 2024-03-26 ENCOUNTER — OFFICE VISIT (OUTPATIENT)
Dept: INTERNAL MEDICINE CLINIC | Facility: CLINIC | Age: 79
End: 2024-03-26

## 2024-03-26 ENCOUNTER — TELEPHONE (OUTPATIENT)
Dept: INTERNAL MEDICINE CLINIC | Facility: CLINIC | Age: 79
End: 2024-03-26

## 2024-03-26 VITALS
SYSTOLIC BLOOD PRESSURE: 128 MMHG | TEMPERATURE: 98 F | WEIGHT: 112.38 LBS | DIASTOLIC BLOOD PRESSURE: 82 MMHG | BODY MASS INDEX: 21.22 KG/M2 | HEART RATE: 110 BPM | HEIGHT: 61 IN | OXYGEN SATURATION: 100 %

## 2024-03-26 DIAGNOSIS — I10 ESSENTIAL HYPERTENSION: ICD-10-CM

## 2024-03-26 DIAGNOSIS — T78.40XA ALLERGIC REACTION, INITIAL ENCOUNTER: Primary | ICD-10-CM

## 2024-03-26 DIAGNOSIS — E78.00 HYPERCHOLESTEROLEMIA: ICD-10-CM

## 2024-03-26 DIAGNOSIS — M35.3 POLYMYALGIA RHEUMATICA (HCC): ICD-10-CM

## 2024-03-26 DIAGNOSIS — R53.1 WEAKNESS: ICD-10-CM

## 2024-03-26 PROCEDURE — 99214 OFFICE O/P EST MOD 30 MIN: CPT | Performed by: INTERNAL MEDICINE

## 2024-03-26 RX ORDER — TRIAMCINOLONE ACETONIDE 1 MG/G
CREAM TOPICAL 2 TIMES DAILY PRN
Qty: 80 G | Refills: 3 | Status: SHIPPED | OUTPATIENT
Start: 2024-03-26

## 2024-03-26 RX ORDER — METOPROLOL SUCCINATE 25 MG/1
25 TABLET, EXTENDED RELEASE ORAL DAILY
Qty: 90 TABLET | Refills: 3 | Status: SHIPPED | OUTPATIENT
Start: 2024-03-26

## 2024-03-26 NOTE — TELEPHONE ENCOUNTER
I contacted [facility] pharmacy with 's instructions to fill prescription as written and was told that the prescription was filled and patient picked it up already.

## 2024-03-26 NOTE — TELEPHONE ENCOUNTER
Fax rec'd from Liss Gamez regarding:  Triamcinolone   Patient was seen in the office today  \"The use of Triamcinolone Acetonide External Cream may result in an allergic reaction based on a reported history of allergy to Prednisone oral tablet 10MG. \"  Fax placed in green folder  Tasked to RX

## 2024-03-26 NOTE — PROGRESS NOTES
Julissa Ferro is a 78 year old female.  HPI:   She had recurrence of fatigue, lack of energy, numbness of toes and fingers and very poor appetite -  I resumed prednisone  and she took two 10 mg tablets of prednisone on Friday and then broke out in sindy pruritic confluent red rash across the back and anterior chest -  she only took 2 prednisone tabs. The rash is now starting to scale, itching less since using OTC steroid cream.     Sed rate was 42.  Strength and energy very poor.     She is also on enalapril and hydrochlorothiazide     She has lost 30 lbs since last June - she eats very poorly.     No NVD, no dysphagia.     Hb has risen from 10.7 in Jan to 12.0 now.     Alk phos noted to be 146.       SR: no chest pain or sob, no gu or gi sx.    BP Readings from Last 6 Encounters:   03/26/24 128/82   01/30/24 122/70   11/08/23 148/84   10/18/23 130/78   09/27/23 144/84   06/13/23 142/80     Wt Readings from Last 6 Encounters:   03/26/24 112 lb 6.4 oz (51 kg)   01/30/24 120 lb (54.4 kg)   11/08/23 126 lb (57.2 kg)   10/18/23 138 lb (62.6 kg)   09/27/23 138 lb 6.4 oz (62.8 kg)   06/13/23 146 lb (66.2 kg)     Current Outpatient Medications   Medication Sig Dispense Refill    ENALAPRIL 20 MG Oral Tab TAKE 1 TABLET(20 MG) BY MOUTH DAILY 90 tablet 3    hydroCHLOROthiazide 12.5 MG Oral Tab Take 1 tablet (12.5 mg total) by mouth daily. 90 tablet 3    Boswellia Gee (BOSWELLIA OR) Take by mouth daily.      Calcium Carbonate-Vitamin D (OSCAL 500/200 D-3 OR) Take 1 tablet by mouth daily.      B Complex-C (SUPER B COMPLEX OR) Take 1 tablet by mouth daily.      Multiple Vitamins-Minerals (CENTRUM SILVER) Oral Tab Take 1 tablet by mouth daily.      predniSONE 10 MG Oral Tab Take 1 tablet (10 mg total) by mouth As Directed. (Patient not taking: Reported on 3/26/2024) 100 tablet 0      Past Medical History:   Diagnosis Date    Fracture dislocation of left shoulder joint     w/ nerve injury 2009    Injury of nerve, shoulder or  arm     w/ dislocation fx of L shoulder 2009    Nerve injury     nerve damage left hand 2009      Social History:  Social History     Socioeconomic History    Marital status:    Tobacco Use    Smoking status: Never    Smokeless tobacco: Never   Vaping Use    Vaping Use: Never used   Substance and Sexual Activity    Alcohol use: Yes     Alcohol/week: 0.0 standard drinks of alcohol     Comment: rarely. 3 drinks a year    Drug use: No   Other Topics Concern    Caffeine Concern Yes     Comment: Coffee 2 cups daily        REVIEW OF SYSTEMS:   GENERAL HEALTH: feels well otherwise  SKIN: denies any unusual skin lesions or rashes  RESPIRATORY: denies shortness of breath with exertion  CARDIOVASCULAR: denies chest pain on exertion  GI: denies abdominal pain and denies heartburn  NEURO: denies headaches    EXAM:   /82   Pulse 110   Temp 98.3 °F (36.8 °C)   Ht 5' 1\" (1.549 m)   Wt 112 lb 6.4 oz (51 kg)   SpO2 100%   BMI 21.24 kg/m²   GENERAL: well developed, well nourished,in no apparent distress  SKIN: no rashes,no suspicious lesions  HEENT: atraumatic, normocephalic,ears and throat are clear  NECK: supple,no adenopathy,no bruits  LUNGS: clear to auscultation  CARDIO: RRR without murmur  GI: good BS's,no masses, HSM or tenderness  EXTREMITIES: no cyanosis, clubbing or edema    ASSESSMENT AND PLAN:   1. Allergic reaction, initial encounter  I am stopping all medication at this point including enalapril, hydrochlorothiazide and amends and supplements.    I am giving her topical triamcinolone cream and I will reexamine her in 1 week.    2. Essential hypertension  At goal but stop antihypertensives at this time.    3. Hypercholesterolemia  At goal, no medication but HDL 24    4. Polymyalgia rheumatica (HCC)  No signs of acute inflammatory process at this time.    5. Weakness  Her weakness fatigue and weight loss remain unexplained I am reevaluating her in 1 week and she may need complete evaluation including  CAT scan of the chest abdomen and pelvis and endoscopy.    The patient indicates understanding of these issues and agrees to the plan.  The patient is asked to return in 1 week..

## 2024-04-02 ENCOUNTER — OFFICE VISIT (OUTPATIENT)
Dept: INTERNAL MEDICINE CLINIC | Facility: CLINIC | Age: 79
End: 2024-04-02

## 2024-04-02 VITALS
TEMPERATURE: 98 F | WEIGHT: 112 LBS | HEART RATE: 72 BPM | SYSTOLIC BLOOD PRESSURE: 128 MMHG | HEIGHT: 61 IN | BODY MASS INDEX: 21.14 KG/M2 | DIASTOLIC BLOOD PRESSURE: 80 MMHG

## 2024-04-02 DIAGNOSIS — E78.00 HYPERCHOLESTEREMIA: Primary | ICD-10-CM

## 2024-04-02 DIAGNOSIS — I10 ESSENTIAL HYPERTENSION: ICD-10-CM

## 2024-04-02 DIAGNOSIS — T78.40XA ALLERGIC REACTION, INITIAL ENCOUNTER: ICD-10-CM

## 2024-04-02 DIAGNOSIS — R53.1 WEAKNESS: ICD-10-CM

## 2024-04-02 PROCEDURE — 99214 OFFICE O/P EST MOD 30 MIN: CPT | Performed by: INTERNAL MEDICINE

## 2024-04-02 RX ORDER — TRIAMCINOLONE ACETONIDE 1 MG/G
CREAM TOPICAL 2 TIMES DAILY PRN
Qty: 80 G | Refills: 3 | Status: SHIPPED | OUTPATIENT
Start: 2024-04-02

## 2024-04-02 NOTE — PROGRESS NOTES
Julissa Ferro is a 78 year old female.  HPI:   Is here in follow-up for rash treated last week with triamcinolone cream.  The rash is becoming less confluent, I be steroid cream lessens the itching quite a bit but the rash is still mostly confluent over her back but lessening up over the anterior chest arms and legs.    She otherwise feels the same in terms of fatigue and lack of appetite.  Weight has been stable over the last week.    She has no other alarm symptoms but the weight loss is concerning and a she knows this may need to be evaluated.  The other issue in play is the extreme stress that she has been under caring for her ill .    She remains off all prescription and is well as over-the-counter medication including vitamins    SR: no chest pain or sob, no gu or gi sx.    BP Readings from Last 6 Encounters:   04/02/24 128/80   03/26/24 128/82   01/30/24 122/70   11/08/23 148/84   10/18/23 130/78   09/27/23 144/84     Wt Readings from Last 6 Encounters:   04/02/24 112 lb (50.8 kg)   03/26/24 112 lb 6.4 oz (51 kg)   01/30/24 120 lb (54.4 kg)   11/08/23 126 lb (57.2 kg)   10/18/23 138 lb (62.6 kg)   09/27/23 138 lb 6.4 oz (62.8 kg)     Current Outpatient Medications   Medication Sig Dispense Refill    triamcinolone 0.1 % External Cream Apply topically 2 (two) times daily as needed. 80 g 3    metoprolol succinate ER 25 MG Oral Tablet 24 Hr Take 1 tablet (25 mg total) by mouth daily. (Patient not taking: Reported on 4/2/2024) 90 tablet 3    predniSONE 10 MG Oral Tab Take 1 tablet (10 mg total) by mouth As Directed. (Patient not taking: Reported on 3/26/2024) 100 tablet 0    ENALAPRIL 20 MG Oral Tab TAKE 1 TABLET(20 MG) BY MOUTH DAILY (Patient not taking: Reported on 4/2/2024) 90 tablet 3    hydroCHLOROthiazide 12.5 MG Oral Tab Take 1 tablet (12.5 mg total) by mouth daily. (Patient not taking: Reported on 4/2/2024) 90 tablet 3    Boswellia Gee (BOSWELLIA OR) Take by mouth daily. (Patient not taking:  Reported on 4/2/2024)      Calcium Carbonate-Vitamin D (OSCAL 500/200 D-3 OR) Take 1 tablet by mouth daily. (Patient not taking: Reported on 4/2/2024)      B Complex-C (SUPER B COMPLEX OR) Take 1 tablet by mouth daily. (Patient not taking: Reported on 4/2/2024)      Multiple Vitamins-Minerals (CENTRUM SILVER) Oral Tab Take 1 tablet by mouth daily. (Patient not taking: Reported on 4/2/2024)        Past Medical History:   Diagnosis Date    Fracture dislocation of left shoulder joint     w/ nerve injury 2009    Injury of nerve, shoulder or arm     w/ dislocation fx of L shoulder 2009    Nerve injury     nerve damage left hand 2009      Social History:  Social History     Socioeconomic History    Marital status:    Tobacco Use    Smoking status: Never    Smokeless tobacco: Never   Vaping Use    Vaping Use: Never used   Substance and Sexual Activity    Alcohol use: Yes     Alcohol/week: 0.0 standard drinks of alcohol     Comment: rarely. 3 drinks a year    Drug use: No   Other Topics Concern    Caffeine Concern Yes     Comment: Coffee 2 cups daily        REVIEW OF SYSTEMS:   GENERAL HEALTH: feels well otherwise  SKIN: denies any unusual skin lesions or rashes  RESPIRATORY: denies shortness of breath with exertion  CARDIOVASCULAR: denies chest pain on exertion  GI: denies abdominal pain and denies heartburn  NEURO: denies headaches    EXAM:   /80   Pulse 72   Temp 97.7 °F (36.5 °C)   Ht 5' 1\" (1.549 m)   Wt 112 lb (50.8 kg)   BMI 21.16 kg/m²   GENERAL: well developed, well nourished,in no apparent distress  SKIN: no rashes,no suspicious lesions  HEENT: atraumatic, normocephalic,ears and throat are clear  NECK: supple,no adenopathy,no bruits  LUNGS: clear to auscultation  CARDIO: RRR without murmur  GI: good BS's,no masses, HSM or tenderness  EXTREMITIES: no cyanosis, clubbing or edema    The rash is as described above in the present illness    ASSESSMENT AND PLAN:   1. Hypercholesteremia    - CBC W  Differential W Platelet [E]; Future  - Comp Metabolic Panel (14) [E]; Future  - Sed Rate, Westergren (Automated); Future    2. Allergic reaction, initial encounter  I have no etiology to the rash which I feel is secondary to an allergic reaction.  I am referring to dermatology.  In the meantime continue triamcinolone cream    3. Weakness  Labs ordered    4. Essential hypertension  Her blood pressure is at goal and I am with holding all antihypertensives at this point.    The patient indicates understanding of these issues and agrees to the plan.  The patient is asked to return in 1 month or sooner as needed.

## 2024-04-16 ENCOUNTER — APPOINTMENT (OUTPATIENT)
Dept: URBAN - METROPOLITAN AREA CLINIC 244 | Age: 79
Setting detail: DERMATOLOGY
End: 2024-04-17

## 2024-04-16 DIAGNOSIS — R21 RASH AND OTHER NONSPECIFIC SKIN ERUPTION: ICD-10-CM

## 2024-04-16 PROCEDURE — OTHER PRESCRIPTION MEDICATION MANAGEMENT: OTHER

## 2024-04-16 PROCEDURE — OTHER MIPS QUALITY: OTHER

## 2024-04-16 PROCEDURE — OTHER COUNSELING: OTHER

## 2024-04-16 PROCEDURE — OTHER PRESCRIPTION: OTHER

## 2024-04-16 PROCEDURE — OTHER ORDER TESTS: OTHER

## 2024-04-16 PROCEDURE — 99202 OFFICE O/P NEW SF 15 MIN: CPT

## 2024-04-16 RX ORDER — TRIAMCINOLONE ACETONIDE 1 MG/G
OINTMENT TOPICAL
Qty: 454 | Refills: 0 | Status: ERX | COMMUNITY
Start: 2024-04-16

## 2024-04-16 ASSESSMENT — LOCATION SIMPLE DESCRIPTION DERM
LOCATION SIMPLE: CHEST
LOCATION SIMPLE: RIGHT POSTERIOR UPPER ARM
LOCATION SIMPLE: UPPER BACK
LOCATION SIMPLE: LEFT POSTERIOR UPPER ARM

## 2024-04-16 ASSESSMENT — LOCATION ZONE DERM
LOCATION ZONE: ARM
LOCATION ZONE: TRUNK

## 2024-04-16 ASSESSMENT — LOCATION DETAILED DESCRIPTION DERM
LOCATION DETAILED: RIGHT DISTAL POSTERIOR UPPER ARM
LOCATION DETAILED: UPPER STERNUM
LOCATION DETAILED: LEFT DISTAL POSTERIOR UPPER ARM
LOCATION DETAILED: INFERIOR THORACIC SPINE

## 2024-04-16 NOTE — PROCEDURE: PRESCRIPTION MEDICATION MANAGEMENT
Continue Regimen: Triamcinolone 0.1% topical cream
Detail Level: Zone
Render In Strict Bullet Format?: No

## 2024-04-16 NOTE — HPI: RASH
What Type Of Note Output Would You Prefer (Optional)?: Bullet Format
How Severe Is Your Rash?: mild
Is This A New Presentation, Or A Follow-Up?: Rash
Additional History: Patient started prednisone 4 weeks ago and rash started afterwards.

## 2024-04-16 NOTE — PROCEDURE: ORDER TESTS
Expected Date Of Service: 04/16/2024
Billing Type: Third-Party Bill
Lab Facility: [id number]
Bill For Surgical Tray: no
Performing Laboratory: [id number]

## 2024-05-01 ENCOUNTER — APPOINTMENT (OUTPATIENT)
Dept: URBAN - METROPOLITAN AREA CLINIC 244 | Age: 79
Setting detail: DERMATOLOGY
End: 2024-05-01

## 2024-05-01 DIAGNOSIS — L82.0 INFLAMED SEBORRHEIC KERATOSIS: ICD-10-CM

## 2024-05-01 DIAGNOSIS — R21 RASH AND OTHER NONSPECIFIC SKIN ERUPTION: ICD-10-CM

## 2024-05-01 PROCEDURE — OTHER PRESCRIPTION: OTHER

## 2024-05-01 PROCEDURE — OTHER COUNSELING: OTHER

## 2024-05-01 PROCEDURE — OTHER DEFER: OTHER

## 2024-05-01 PROCEDURE — OTHER PRESCRIPTION MEDICATION MANAGEMENT: OTHER

## 2024-05-01 PROCEDURE — 99213 OFFICE O/P EST LOW 20 MIN: CPT

## 2024-05-01 RX ORDER — TRIAMCINOLONE ACETONIDE 1 MG/G
OINTMENT TOPICAL
Qty: 454 | Refills: 0 | Status: ACTIVE

## 2024-05-01 ASSESSMENT — LOCATION SIMPLE DESCRIPTION DERM
LOCATION SIMPLE: RIGHT POSTERIOR UPPER ARM
LOCATION SIMPLE: CHEST
LOCATION SIMPLE: LEFT POSTERIOR UPPER ARM
LOCATION SIMPLE: LEFT SUPERIOR EYELID
LOCATION SIMPLE: UPPER BACK

## 2024-05-01 ASSESSMENT — LOCATION ZONE DERM
LOCATION ZONE: ARM
LOCATION ZONE: TRUNK
LOCATION ZONE: EYELID

## 2024-05-01 ASSESSMENT — LOCATION DETAILED DESCRIPTION DERM
LOCATION DETAILED: UPPER STERNUM
LOCATION DETAILED: RIGHT DISTAL POSTERIOR UPPER ARM
LOCATION DETAILED: LEFT LATERAL SUPERIOR EYELID
LOCATION DETAILED: LEFT DISTAL POSTERIOR UPPER ARM
LOCATION DETAILED: INFERIOR THORACIC SPINE

## 2024-05-01 NOTE — PROCEDURE: PRESCRIPTION MEDICATION MANAGEMENT
Continue Regimen: Triamcinolone 0.1% topical cream for one to two weeks
Detail Level: Zone
Render In Strict Bullet Format?: No

## 2024-05-01 NOTE — PROCEDURE: DEFER
X Size Of Lesion In Cm (Optional): 0
Detail Level: Detailed
Instructions (Optional): Cryo
Introduction Text (Please End With A Colon): The following procedure was deferred:

## 2024-05-10 ENCOUNTER — LAB ENCOUNTER (OUTPATIENT)
Dept: LAB | Age: 79
End: 2024-05-10
Attending: INTERNAL MEDICINE

## 2024-05-10 DIAGNOSIS — E78.00 HYPERCHOLESTEREMIA: ICD-10-CM

## 2024-05-10 DIAGNOSIS — R63.4 UNINTENTIONAL WEIGHT LOSS: ICD-10-CM

## 2024-05-10 LAB
ALBUMIN SERPL-MCNC: 3.9 G/DL (ref 3.2–4.8)
ALBUMIN/GLOB SERPL: 1.4 {RATIO} (ref 1–2)
ALP LIVER SERPL-CCNC: 105 U/L
ALT SERPL-CCNC: <7 U/L
ANION GAP SERPL CALC-SCNC: 10 MMOL/L (ref 0–18)
AST SERPL-CCNC: 17 U/L (ref ?–34)
BASOPHILS # BLD AUTO: 0.05 X10(3) UL (ref 0–0.2)
BASOPHILS NFR BLD AUTO: 0.6 %
BILIRUB SERPL-MCNC: 0.8 MG/DL (ref 0.2–1.1)
BUN BLD-MCNC: 13 MG/DL (ref 9–23)
BUN/CREAT SERPL: 20 (ref 10–20)
CALCIUM BLD-MCNC: 9.7 MG/DL (ref 8.7–10.4)
CHLORIDE SERPL-SCNC: 107 MMOL/L (ref 98–112)
CO2 SERPL-SCNC: 26 MMOL/L (ref 21–32)
CREAT BLD-MCNC: 0.65 MG/DL
DEPRECATED RDW RBC AUTO: 45.1 FL (ref 35.1–46.3)
EGFRCR SERPLBLD CKD-EPI 2021: 90 ML/MIN/1.73M2 (ref 60–?)
EOSINOPHIL # BLD AUTO: 0.15 X10(3) UL (ref 0–0.7)
EOSINOPHIL NFR BLD AUTO: 1.9 %
ERYTHROCYTE [DISTWIDTH] IN BLOOD BY AUTOMATED COUNT: 14 % (ref 11–15)
ERYTHROCYTE [SEDIMENTATION RATE] IN BLOOD: 58 MM/HR
FASTING STATUS PATIENT QL REPORTED: YES
GLOBULIN PLAS-MCNC: 2.8 G/DL (ref 2–3.5)
GLUCOSE BLD-MCNC: 100 MG/DL (ref 70–99)
HCT VFR BLD AUTO: 36.9 %
HGB BLD-MCNC: 12 G/DL
IMM GRANULOCYTES # BLD AUTO: 0.02 X10(3) UL (ref 0–1)
IMM GRANULOCYTES NFR BLD: 0.3 %
LYMPHOCYTES # BLD AUTO: 1.83 X10(3) UL (ref 1–4)
LYMPHOCYTES NFR BLD AUTO: 23.2 %
MCH RBC QN AUTO: 29.1 PG (ref 26–34)
MCHC RBC AUTO-ENTMCNC: 32.5 G/DL (ref 31–37)
MCV RBC AUTO: 89.3 FL
MONOCYTES # BLD AUTO: 0.64 X10(3) UL (ref 0.1–1)
MONOCYTES NFR BLD AUTO: 8.1 %
NEUTROPHILS # BLD AUTO: 5.19 X10 (3) UL (ref 1.5–7.7)
NEUTROPHILS # BLD AUTO: 5.19 X10(3) UL (ref 1.5–7.7)
NEUTROPHILS NFR BLD AUTO: 65.9 %
OSMOLALITY SERPL CALC.SUM OF ELEC: 296 MOSM/KG (ref 275–295)
PLATELET # BLD AUTO: 462 10(3)UL (ref 150–450)
POTASSIUM SERPL-SCNC: 4.1 MMOL/L (ref 3.5–5.1)
PROT SERPL-MCNC: 6.7 G/DL (ref 5.7–8.2)
RBC # BLD AUTO: 4.13 X10(6)UL
SODIUM SERPL-SCNC: 143 MMOL/L (ref 136–145)
WBC # BLD AUTO: 7.9 X10(3) UL (ref 4–11)

## 2024-05-10 PROCEDURE — 80053 COMPREHEN METABOLIC PANEL: CPT

## 2024-05-10 PROCEDURE — 84443 ASSAY THYROID STIM HORMONE: CPT

## 2024-05-10 PROCEDURE — 36415 COLL VENOUS BLD VENIPUNCTURE: CPT

## 2024-05-10 PROCEDURE — 85652 RBC SED RATE AUTOMATED: CPT

## 2024-05-10 PROCEDURE — 85025 COMPLETE CBC W/AUTO DIFF WBC: CPT

## 2024-05-14 ENCOUNTER — TELEPHONE (OUTPATIENT)
Dept: INTERNAL MEDICINE CLINIC | Facility: CLINIC | Age: 79
End: 2024-05-14

## 2024-05-14 ENCOUNTER — HOSPITAL ENCOUNTER (OUTPATIENT)
Dept: ULTRASOUND IMAGING | Facility: HOSPITAL | Age: 79
Discharge: HOME | End: 2024-05-14
Attending: INTERNAL MEDICINE

## 2024-05-14 ENCOUNTER — OFFICE VISIT (OUTPATIENT)
Dept: INTERNAL MEDICINE CLINIC | Facility: CLINIC | Age: 79
End: 2024-05-14

## 2024-05-14 VITALS
BODY MASS INDEX: 20.67 KG/M2 | DIASTOLIC BLOOD PRESSURE: 78 MMHG | WEIGHT: 109.5 LBS | SYSTOLIC BLOOD PRESSURE: 128 MMHG | HEIGHT: 61 IN | OXYGEN SATURATION: 99 % | HEART RATE: 120 BPM

## 2024-05-14 DIAGNOSIS — R60.0 EDEMA OF LEFT LOWER EXTREMITY: ICD-10-CM

## 2024-05-14 DIAGNOSIS — R63.4 UNINTENTIONAL WEIGHT LOSS: ICD-10-CM

## 2024-05-14 DIAGNOSIS — R63.4 UNINTENTIONAL WEIGHT LOSS: Primary | ICD-10-CM

## 2024-05-14 LAB — TSI SER-ACNC: 2.86 MIU/ML (ref 0.55–4.78)

## 2024-05-14 PROCEDURE — 93970 EXTREMITY STUDY: CPT | Performed by: INTERNAL MEDICINE

## 2024-05-14 PROCEDURE — 99214 OFFICE O/P EST MOD 30 MIN: CPT | Performed by: INTERNAL MEDICINE

## 2024-05-14 NOTE — PROGRESS NOTES
Julissa Ferro is a 78 year old female.  Chief Complaint   Patient presents with    Checkup     1 month      HPI:   Julissa Ferro is a 78 year old female who presents for a 1 month follow up.    LOV 4/2/24.    Since, she has been doing okay.    Rash is completely resolved, seen by derm, given topical gel.    Admits to stable appetite, lost 3 lbs since last visit.    Admits to L ankle swelling since February. Denies trauma or pain, erythema/warmth.    Denies other sx including f/c, night sweats, CP, dyspnea, n/v/d/c, dysuria, hematuria, melena, hematochezia.    Labs from 5/10/24 reviewed.    Wt Readings from Last 6 Encounters:   05/14/24 109 lb 8 oz (49.7 kg)   04/02/24 112 lb (50.8 kg)   03/26/24 112 lb 6.4 oz (51 kg)   01/30/24 120 lb (54.4 kg)   11/08/23 126 lb (57.2 kg)   10/18/23 138 lb (62.6 kg)     Body mass index is 20.69 kg/m².     Current Outpatient Medications   Medication Sig Dispense Refill    triamcinolone 0.1 % External Cream Apply topically 2 (two) times daily as needed. 80 g 3    metoprolol succinate ER 25 MG Oral Tablet 24 Hr Take 1 tablet (25 mg total) by mouth daily. 90 tablet 3    Boswellia Gee (BOSWELLIA OR) Take by mouth daily.      Calcium Carbonate-Vitamin D (OSCAL 500/200 D-3 OR) Take 1 tablet by mouth daily.      B Complex-C (SUPER B COMPLEX OR) Take 1 tablet by mouth daily.      Multiple Vitamins-Minerals (CENTRUM SILVER) Oral Tab Take 1 tablet by mouth daily.      hydroCHLOROthiazide 12.5 MG Oral Tab Take 1 tablet (12.5 mg total) by mouth daily. (Patient not taking: Reported on 5/14/2024) 90 tablet 3      Past Medical History:    Fracture dislocation of left shoulder joint    w/ nerve injury 2009    Injury of nerve, shoulder or arm    w/ dislocation fx of L shoulder 2009    Nerve injury    nerve damage left hand 2009      Past Surgical History:   Procedure Laterality Date    Colonoscopy N/A 8/10/2017    Procedure: COLONOSCOPY;  Surgeon: Sony Galvez MD;  Location: [facility] ENDOSCOPY     Tubal ligation        Family History   Problem Relation Age of Onset    Alcohol and Other Disorders Associated Father     Diabetes Paternal Aunt     Diabetes Maternal Uncle     No Known Problems Son     No Known Problems Son     Breast Cancer Neg     Ovarian Cancer Neg       Social History:   Social History     Socioeconomic History    Marital status:    Tobacco Use    Smoking status: Never    Smokeless tobacco: Never   Vaping Use    Vaping status: Never Used   Substance and Sexual Activity    Alcohol use: Yes     Comment: rarely 3 drinks a year    Drug use: No   Other Topics Concern    Caffeine Concern Yes     Comment: Coffee 2 cups daily        REVIEW OF SYSTEMS:   GENERAL: feels well otherwise, + unintentional weight loss  SKIN: denies any unusual skin lesions  EYES:denies blurred vision or double vision  HEENT: denies nasal congestion, sinus pain, ST, sore throat  LUNGS: denies shortness of breath with exertion, cough or wheezing  CARDIOVASCULAR: denies chest pain, pressure, or palpitations  GI: denies abdominal pain, nausea, vomiting, diarrhea, constipation, hematochezia, or melena  : denies dysuria, urinary frequency, vaginal discharge, dyspareunia, heavy menses  NEURO: denies headaches, dizziness, focal deficits  EXT: + LLE ankle edema    EXAM:   /78   Pulse 120   Ht 5' 1\" (1.549 m)   Wt 109 lb 8 oz (49.7 kg)   SpO2 99%   BMI 20.69 kg/m²     GENERAL: well developed, well nourished, in no apparent distress  LUNGS: clear to auscultation b/l, no w/r/r  CARDIO: RRR, normal S1S2, no m/r/g  GI: soft, NT, ND, NABS, no HSM  EXTREMITIES: + L ankle edema, trace LLE edema  NEURO: A&O x 3, moves all 4 extremities spontaneously      ASSESSMENT AND PLAN:   Julissa Ferro is a 78 year old female who presents for a 1 month follow up.    LLE edema  - US VENOUS DOPPLER LEG BILAT - DIAG IMG (CPT=93970); Future    Unintentional weight loss  - CT CHEST+ABDOMEN+PELVIS(ALL CNTRST ONLY)(CPT=71821/35522);  Future  - TSH W Reflex To Free T4 [E]; Future    Elevated ESR  - CT CHEST+ABDOMEN+PELVIS(ALL CNTRST ONLY)(JMP=69035/78686); Future  - consider further lab testing if imaging unrevealing    Thrombocytosis  - mild, inflammatory w/o as above, CTM    Tachycardia  - TSH W Reflex To Free T4 [E]; Future    RTC pending results of above testing.    For E/M code - 30 minutes spent reviewing performing chart review, obtaining a history, performing a physical exam, reviewing the assessment/plan, placing orders, and completing documentation.     Homa Romero DO  5/14/2024  10:06 AM

## 2024-05-14 NOTE — TELEPHONE ENCOUNTER
Called patient with lab result and results of b/l LE venous doppler. Rx for eliquis sent to patient's pharmacy. Side effects including risk of ICH and GIB discussed. Advised to avoid NSAIDs, ETOH with use of Eliquis. CT C/A/P scheduled for 5/24/24.    All questions were answered.

## 2024-05-25 ENCOUNTER — TELEPHONE (OUTPATIENT)
Dept: INTERNAL MEDICINE CLINIC | Facility: CLINIC | Age: 79
End: 2024-05-25

## 2024-05-25 ENCOUNTER — HOSPITAL ENCOUNTER (OUTPATIENT)
Dept: CT IMAGING | Facility: HOSPITAL | Age: 79
Discharge: HOME | End: 2024-05-25
Attending: INTERNAL MEDICINE

## 2024-05-25 DIAGNOSIS — R63.4 UNINTENTIONAL WEIGHT LOSS: ICD-10-CM

## 2024-05-25 PROCEDURE — 71260 CT THORAX DX C+: CPT | Performed by: INTERNAL MEDICINE

## 2024-05-25 PROCEDURE — 74177 CT ABD & PELVIS W/CONTRAST: CPT | Performed by: INTERNAL MEDICINE

## 2024-05-28 ENCOUNTER — OFFICE VISIT (OUTPATIENT)
Dept: INTERNAL MEDICINE CLINIC | Facility: CLINIC | Age: 79
End: 2024-05-28

## 2024-05-28 VITALS
HEART RATE: 120 BPM | OXYGEN SATURATION: 94 % | DIASTOLIC BLOOD PRESSURE: 86 MMHG | WEIGHT: 106 LBS | SYSTOLIC BLOOD PRESSURE: 136 MMHG | TEMPERATURE: 99 F | BODY MASS INDEX: 20.01 KG/M2 | HEIGHT: 61 IN

## 2024-05-28 DIAGNOSIS — R59.0 AXILLARY LYMPHADENOPATHY: Primary | ICD-10-CM

## 2024-05-28 PROCEDURE — 99214 OFFICE O/P EST MOD 30 MIN: CPT | Performed by: INTERNAL MEDICINE

## 2024-05-28 RX ORDER — DOXYCYCLINE HYCLATE 100 MG/1
100 CAPSULE ORAL 2 TIMES DAILY
Qty: 14 CAPSULE | Refills: 0 | Status: SHIPPED | OUTPATIENT
Start: 2024-05-28 | End: 2024-06-04

## 2024-05-28 NOTE — PROGRESS NOTES
Julissa Ferro is a 78 year old female.  Chief Complaint   Patient presents with    Results     Discuss CT abd/pelvis result.  Reports possible infection to Rt upper arm--IV site placement (contrast for imaging).  Onset: 5/26.  No drainage. NO Fever     HPI:   Julissa Ferro is a 78 year old female who presents for CT results.    CT C/A/P results from 5/25/24 discussed.    C/o RUE infection since placement of IV on 5/25. Denies f/c, discharge from area.    Wt Readings from Last 6 Encounters:   05/28/24 106 lb (48.1 kg)   05/14/24 109 lb 8 oz (49.7 kg)   04/02/24 112 lb (50.8 kg)   03/26/24 112 lb 6.4 oz (51 kg)   01/30/24 120 lb (54.4 kg)   11/08/23 126 lb (57.2 kg)     Body mass index is 20.03 kg/m².     Current Outpatient Medications   Medication Sig Dispense Refill    doxycycline 100 MG Oral Cap Take 1 capsule (100 mg total) by mouth 2 (two) times daily for 7 days. 14 capsule 0    apixaban 5 MG Oral Tab Take 1 tablet (5 mg total) by mouth 2 (two) times daily. 120 tablet 0    apixaban 5 MG Oral Tab Take 1 tablet (5 mg total) by mouth 2 (two) times daily. Take two tablets (10 mg) by mouth twice daily for 7 days, then take one tablet (5 mg) by mouth twice daily thereafter. 74 tablet 0    Boswellia Gee (BOSWELLIA OR) Take by mouth daily.      Calcium Carbonate-Vitamin D (OSCAL 500/200 D-3 OR) Take 1 tablet by mouth daily.      B Complex-C (SUPER B COMPLEX OR) Take 1 tablet by mouth daily.      Multiple Vitamins-Minerals (CENTRUM SILVER) Oral Tab Take 1 tablet by mouth daily.        Past Medical History:    Fracture dislocation of left shoulder joint    w/ nerve injury 2009    Injury of nerve, shoulder or arm    w/ dislocation fx of L shoulder 2009    Nerve injury    nerve damage left hand 2009      Past Surgical History:   Procedure Laterality Date    Colonoscopy N/A 8/10/2017    Procedure: COLONOSCOPY;  Surgeon: Sony Galvez MD;  Location: [facility] ENDOSCOPY    Tubal ligation        Family History   Problem  Relation Age of Onset    Alcohol and Other Disorders Associated Father     Diabetes Paternal Aunt     Diabetes Maternal Uncle     No Known Problems Son     No Known Problems Son     Breast Cancer Neg     Ovarian Cancer Neg       Social History:   Social History     Socioeconomic History    Marital status:    Tobacco Use    Smoking status: Never    Smokeless tobacco: Never   Vaping Use    Vaping status: Never Used   Substance and Sexual Activity    Alcohol use: Yes     Comment: rarely 3 drinks a year    Drug use: No   Other Topics Concern    Caffeine Concern Yes     Comment: Coffee 2 cups daily          REVIEW OF SYSTEMS:   GENERAL: denies f/c  EXT: + RUE infection      EXAM:   /86   Pulse 120   Temp 98.7 °F (37.1 °C) (Oral)   Ht 5' 1\" (1.549 m)   Wt 106 lb (48.1 kg)   SpO2 94%   BMI 20.03 kg/m²     GENERAL: well developed, well nourished, in no apparent distress  EXTREMITIES: + RUE pustule  NEURO: A&O x 3, moves all 4 extremities spontaneously      ASSESSMENT AND PLAN:   Julissa Ferro is a 78 year old female who presents for CT results.    RUE abscess  - at site of contrast IV  - rx doxycyline 7 day course, side effects discussed, advised to call if no improvement    Axillary lymphadenopathy  Abnormal CT   - advised schedule mammogram, gave phone number  - US AXILLARY BILATERAL SH(CPT=76882); Future    RLL lung nodule  - f/u CT chest 5/2024    LLE femoral and popliteal vein  - continue Eliquis x3 months    HTN  - possibly 2/2 anxiety regarding CT results  - advised patient to monitor bp at home and call if uncontrolled    RTC as previously scheduled or sooner PRN.    For E/M code - 30 minutes spent reviewing performing chart review, obtaining a history, performing a physical exam, reviewing the assessment/plan, placing orders, and completing documentation.     Homa Romero DO  5/28/2024  1:27 PM

## 2024-05-31 ENCOUNTER — TELEPHONE (OUTPATIENT)
Dept: INTERNAL MEDICINE CLINIC | Facility: CLINIC | Age: 79
End: 2024-05-31

## 2024-05-31 NOTE — TELEPHONE ENCOUNTER
To Dr Kirkland  Please Advise    Patient called requesting to be referred to a new OB/GYNE for prolapse, she thinks she is having the same issues as before.

## 2024-05-31 NOTE — TELEPHONE ENCOUNTER
Patient called requesting to be referred to a new OB/GYNE for prolapse, she thinks she is having the same issues as before.

## 2024-06-03 ENCOUNTER — TELEPHONE (OUTPATIENT)
Dept: INTERNAL MEDICINE CLINIC | Facility: CLINIC | Age: 79
End: 2024-06-03

## 2024-06-03 NOTE — TELEPHONE ENCOUNTER
Please call patient  She is experiencing some female  Requesting Dr Kirkland recommendation for an OB/GYN  Patient understands that Dr Kirkland out of office today, back tomorrow, ok to wait  Tasked to nursing

## 2024-06-03 NOTE — TELEPHONE ENCOUNTER
To Dr VIVEROS and Dr JAIN ( Pt of Dr JAIN),    Please advise...    Called and spoke with pt who stated that 2 weeks ago she started having \"daily bouts of diarrhea and a small amount of reddish-brown bleeding daily\". I asked pt where bleeding is coming from and she stated she is not sure if it's coming from her vagina or her rectum. \" Sometimes I even notice it on my panties when I wake up in the morning\" Pt denied any history of hemorrhoids, fever, abdominal pain/cramping, injury or other symptoms. Pt stated that she has a history of a prolapsed bladder and  she saw a specialist and had a \" disc inserted\". Pt does not want to call or go back to the specialist and wants Dr JAIN to call her. Has not taken any meds for her symptoms to date.    Pt advised to go to the UC/ED now for evaluation but stated she will not go and wants to wait to talk with Dr JAIN tomorrow .

## 2024-06-04 NOTE — TELEPHONE ENCOUNTER
Telephone call to patient and situation discussed.  Patient has been having vaginal spotting for the past 2 weeks.  She is also having diarrhea.  She does not have a gynecologist at this time.  I discussed the situation with the patient.  Since Dr. Carlson be back tomorrow she would prefer to wait and discuss his situation with him at that time.  I will refer this message to Dr. Kirkland.

## 2024-06-06 ENCOUNTER — OFFICE VISIT (OUTPATIENT)
Dept: OBGYN CLINIC | Facility: CLINIC | Age: 79
End: 2024-06-06

## 2024-06-06 VITALS
HEART RATE: 116 BPM | WEIGHT: 104.81 LBS | SYSTOLIC BLOOD PRESSURE: 139 MMHG | DIASTOLIC BLOOD PRESSURE: 85 MMHG | BODY MASS INDEX: 20 KG/M2

## 2024-06-06 DIAGNOSIS — N81.4 UTERINE PROLAPSE: ICD-10-CM

## 2024-06-06 DIAGNOSIS — N95.0 POSTMENOPAUSAL BLEEDING: Primary | ICD-10-CM

## 2024-06-06 PROCEDURE — 99202 OFFICE O/P NEW SF 15 MIN: CPT | Performed by: OBSTETRICS & GYNECOLOGY

## 2024-06-07 ENCOUNTER — TELEPHONE (OUTPATIENT)
Dept: OBGYN CLINIC | Facility: CLINIC | Age: 79
End: 2024-06-07

## 2024-06-07 NOTE — PROGRESS NOTES
Julissa Ferro is a 78 year old female  No LMP recorded. Patient is postmenopausal.   Chief Complaint   Patient presents with    Gyn Problem     C/O POSSIBLE PROLAPSE & SPOTTING -patient HAS PESSARY      New patient.  Was seen by another gyne in 2023 for prolapse.  Had Shaatz pessary 3.25 with good support.  Never follow up for pessary cleaning or taught patient to remove pessary.      Has been bleeding for last 2 weeks.    Has been on Warfarin last month for DVT      OBSTETRICS HISTORY:  OB History    Para Term  AB Living   1 1 0 1 0 2   SAB IAB Ectopic Multiple Live Births   0 0 0 1 2       GYNE HISTORY   Follow Up Recommendation: MAMMO 22 MINNIE NEG  --DEXA 22 OSTEOPENIA    MEDICAL HISTORY:  Past Medical History:    Fracture dislocation of left shoulder joint    w/ nerve injury     Injury of nerve, shoulder or arm    w/ dislocation fx of L shoulder     Nerve injury    nerve damage left hand      Past Surgical History:   Procedure Laterality Date    Colonoscopy N/A 8/10/2017    Procedure: COLONOSCOPY;  Surgeon: Sony Galvez MD;  Location: [facility] ENDOSCOPY    Tubal ligation         SOCIAL HISTORY:  Social History     Socioeconomic History    Marital status:    Tobacco Use    Smoking status: Never    Smokeless tobacco: Never   Vaping Use    Vaping status: Never Used   Substance and Sexual Activity    Alcohol use: Yes     Comment: rarely 3 drinks a year    Drug use: No   Other Topics Concern    Caffeine Concern Yes     Comment: Coffee 2 cups daily       MEDICATIONS:  Current Outpatient Medications   Medication Sig Dispense Refill    WARFARIN SODIUM OR Take by mouth.      Boswellia Gee (BOSWELLIA OR) Take by mouth daily.      Calcium Carbonate-Vitamin D (OSCAL 500/200 D-3 OR) Take 1 tablet by mouth daily.      B Complex-C (SUPER B COMPLEX OR) Take 1 tablet by mouth daily.      Multiple Vitamins-Minerals (CENTRUM SILVER) Oral Tab Take 1 tablet by mouth daily.       apixaban 5 MG Oral Tab Take 1 tablet (5 mg total) by mouth 2 (two) times daily. (Patient not taking: Reported on 6/6/2024) 120 tablet 0       ALLERGIES:  No Known Allergies      PHYSICAL EXAM:   /85   Pulse 116   Wt 104 lb 12.8 oz (47.5 kg)   BMI 19.80 kg/m²   Body mass index is 19.8 kg/m².    Constitutional: well developed, well nourished       Pelvic Exam:  External Genitalia: normal appearance, hair distribution, and no lesions  Urethral Meatus:  normal in size, location, without lesions and prolapse  Bladder:  No fullness, masses or tenderness  Vagina:  Normal appearance without lesions, no abnormal discharge.  Shaatz pessary removed.  Scant blood in vault.    Cervix:  Normal without tenderness on motion  Uterus: normal in size, contour, position, mobility, without tenderness  Adnexa: normal without masses or tenderness    Assessment & Plan:  1. Postmenopausal bleeding  Order for pelvic ultrasound  to look at e-stripe.  Left pessary out so that she can have ultrasound done.  She will return after ultrasound to discuss results, do e-bx, and have pessary replaced.    2. Uterine prolapse        Requested Prescriptions      No prescriptions requested or ordered in this encounter

## 2024-06-07 NOTE — TELEPHONE ENCOUNTER
Patient has pessary that needs to be replaced after ultrasound.  Offer patient 6/28 at end of morning session or New Prenatal slot for discussion of ultrasound, e-bx, and pessary replacement

## 2024-06-07 NOTE — TELEPHONE ENCOUNTER
Patient calling to inform Dr. Blanca she has pelvic ultrasound scheduled on . Patient indicating Dr. Blanca wanted her to complete endometrial biopsy after. Patient scheduled for  with Dr. Blanca. Ibuprofen recommendations given. Hx of . .

## 2024-06-07 NOTE — TELEPHONE ENCOUNTER
Patient is calling having Ct done on Monday June 24th , patient was told to let DR know when it was getting done

## 2024-06-08 NOTE — TELEPHONE ENCOUNTER
Notified patient of Dr. Blanca message below. Patient accepts appointment on 6/28 to discuss results, endometrial biopsy and pessary replacement. Advised patient to take ibuprofen 600 mg with food one hour before appointment.

## 2024-06-18 ENCOUNTER — OFFICE VISIT (OUTPATIENT)
Dept: INTERNAL MEDICINE CLINIC | Facility: CLINIC | Age: 79
End: 2024-06-18

## 2024-06-18 ENCOUNTER — LAB ENCOUNTER (OUTPATIENT)
Dept: LAB | Age: 79
End: 2024-06-18
Attending: INTERNAL MEDICINE

## 2024-06-18 VITALS
TEMPERATURE: 98 F | HEART RATE: 103 BPM | OXYGEN SATURATION: 98 % | SYSTOLIC BLOOD PRESSURE: 138 MMHG | RESPIRATION RATE: 16 BRPM | DIASTOLIC BLOOD PRESSURE: 86 MMHG | BODY MASS INDEX: 20.1 KG/M2 | WEIGHT: 102.38 LBS | HEIGHT: 60 IN

## 2024-06-18 DIAGNOSIS — E78.00 HYPERCHOLESTEROLEMIA: ICD-10-CM

## 2024-06-18 DIAGNOSIS — I82.412 ACUTE DEEP VEIN THROMBOSIS (DVT) OF FEMORAL VEIN OF LEFT LOWER EXTREMITY (HCC): ICD-10-CM

## 2024-06-18 DIAGNOSIS — R53.1 WEAKNESS: Primary | ICD-10-CM

## 2024-06-18 DIAGNOSIS — I10 ESSENTIAL HYPERTENSION: ICD-10-CM

## 2024-06-18 DIAGNOSIS — D50.9 IRON DEFICIENCY ANEMIA, UNSPECIFIED IRON DEFICIENCY ANEMIA TYPE: ICD-10-CM

## 2024-06-18 DIAGNOSIS — M35.3 POLYMYALGIA RHEUMATICA (HCC): ICD-10-CM

## 2024-06-18 DIAGNOSIS — R91.1 NODULE OF RIGHT LUNG: ICD-10-CM

## 2024-06-18 PROBLEM — I82.409 DVT (DEEP VENOUS THROMBOSIS) (HCC): Status: ACTIVE | Noted: 2024-01-01

## 2024-06-18 PROBLEM — I82.409 DVT (DEEP VENOUS THROMBOSIS) (HCC): Status: ACTIVE | Noted: 2024-05-10

## 2024-06-18 LAB
ALBUMIN SERPL-MCNC: 3.6 G/DL (ref 3.2–4.8)
ALBUMIN/GLOB SERPL: 1.2 {RATIO} (ref 1–2)
ALP LIVER SERPL-CCNC: 109 U/L
ALT SERPL-CCNC: 15 U/L
ANION GAP SERPL CALC-SCNC: 6 MMOL/L (ref 0–18)
AST SERPL-CCNC: 41 U/L (ref ?–34)
BASOPHILS # BLD AUTO: 0.03 X10(3) UL (ref 0–0.2)
BASOPHILS NFR BLD AUTO: 0.5 %
BILIRUB SERPL-MCNC: 0.7 MG/DL (ref 0.2–1.1)
BUN BLD-MCNC: 15 MG/DL (ref 9–23)
BUN/CREAT SERPL: 25.9 (ref 10–20)
CALCIUM BLD-MCNC: 8.9 MG/DL (ref 8.7–10.4)
CHLORIDE SERPL-SCNC: 105 MMOL/L (ref 98–112)
CO2 SERPL-SCNC: 28 MMOL/L (ref 21–32)
CREAT BLD-MCNC: 0.58 MG/DL
DEPRECATED HBV CORE AB SER IA-ACNC: 449.6 NG/ML
DEPRECATED RDW RBC AUTO: 43.3 FL (ref 35.1–46.3)
EGFRCR SERPLBLD CKD-EPI 2021: 93 ML/MIN/1.73M2 (ref 60–?)
EOSINOPHIL # BLD AUTO: 0.01 X10(3) UL (ref 0–0.7)
EOSINOPHIL NFR BLD AUTO: 0.2 %
ERYTHROCYTE [DISTWIDTH] IN BLOOD BY AUTOMATED COUNT: 14 % (ref 11–15)
ERYTHROCYTE [SEDIMENTATION RATE] IN BLOOD: 22 MM/HR
FASTING STATUS PATIENT QL REPORTED: NO
GLOBULIN PLAS-MCNC: 2.9 G/DL (ref 2–3.5)
GLUCOSE BLD-MCNC: 91 MG/DL (ref 70–99)
HCT VFR BLD AUTO: 36.6 %
HGB BLD-MCNC: 11.9 G/DL
IMM GRANULOCYTES # BLD AUTO: 0.02 X10(3) UL (ref 0–1)
IMM GRANULOCYTES NFR BLD: 0.4 %
LYMPHOCYTES # BLD AUTO: 1.17 X10(3) UL (ref 1–4)
LYMPHOCYTES NFR BLD AUTO: 20.8 %
MCH RBC QN AUTO: 27.9 PG (ref 26–34)
MCHC RBC AUTO-ENTMCNC: 32.5 G/DL (ref 31–37)
MCV RBC AUTO: 85.9 FL
MONOCYTES # BLD AUTO: 0.27 X10(3) UL (ref 0.1–1)
MONOCYTES NFR BLD AUTO: 4.8 %
NEUTROPHILS # BLD AUTO: 4.13 X10 (3) UL (ref 1.5–7.7)
NEUTROPHILS # BLD AUTO: 4.13 X10(3) UL (ref 1.5–7.7)
NEUTROPHILS NFR BLD AUTO: 73.3 %
OSMOLALITY SERPL CALC.SUM OF ELEC: 288 MOSM/KG (ref 275–295)
PLATELET # BLD AUTO: 403 10(3)UL (ref 150–450)
POTASSIUM SERPL-SCNC: 4.3 MMOL/L (ref 3.5–5.1)
PROT SERPL-MCNC: 6.5 G/DL (ref 5.7–8.2)
RBC # BLD AUTO: 4.26 X10(6)UL
SODIUM SERPL-SCNC: 139 MMOL/L (ref 136–145)
TSI SER-ACNC: 7.76 MIU/ML (ref 0.55–4.78)
WBC # BLD AUTO: 5.6 X10(3) UL (ref 4–11)

## 2024-06-18 PROCEDURE — 99214 OFFICE O/P EST MOD 30 MIN: CPT | Performed by: INTERNAL MEDICINE

## 2024-06-18 PROCEDURE — 84443 ASSAY THYROID STIM HORMONE: CPT

## 2024-06-18 PROCEDURE — 80053 COMPREHEN METABOLIC PANEL: CPT

## 2024-06-18 PROCEDURE — 85025 COMPLETE CBC W/AUTO DIFF WBC: CPT

## 2024-06-18 PROCEDURE — 36415 COLL VENOUS BLD VENIPUNCTURE: CPT

## 2024-06-18 PROCEDURE — 82728 ASSAY OF FERRITIN: CPT

## 2024-06-18 PROCEDURE — 85652 RBC SED RATE AUTOMATED: CPT

## 2024-06-18 NOTE — PROGRESS NOTES
Julissa Ferro is a 78 year old female.  HPI:   Recent diagnosis of non-occlusive DVT left femoral vein - unprovoked. CT chest abdo and pelvis neg other than bilateral axillary adenopathy and 0.3 cm ground glass opacity RLL.  She is on Eliquis.     Prior sx of severe myalgia compatible with PMR - all gone, she is no longer on prednisone.     She needs upper and lower endoscopy to complete w/u for weight loss - she has lost about 35 lbs without explanation other than possible extreme stress due to husbands illness. She looks much better today, is laughing and smiling and states she has had an appetite for the last several days.     She is undergoing evaluation for vaginal bleeding which occurred about 1 mo ago - she is seeing Dr Blanca. The bleeding has stopped and endometrial bx is planned in office - US pelvis planned also.       SR: no chest pain or sob, no gu or gi sx.    BP Readings from Last 6 Encounters:   06/18/24 138/86   06/06/24 139/85   05/28/24 136/86   05/14/24 128/78   04/02/24 128/80   03/26/24 128/82     Wt Readings from Last 6 Encounters:   06/18/24 102 lb 6.4 oz (46.4 kg)   06/06/24 104 lb 12.8 oz (47.5 kg)   05/28/24 106 lb (48.1 kg)   05/14/24 109 lb 8 oz (49.7 kg)   04/02/24 112 lb (50.8 kg)   03/26/24 112 lb 6.4 oz (51 kg)     Current Outpatient Medications   Medication Sig Dispense Refill    apixaban 5 MG Oral Tab Take 1 tablet (5 mg total) by mouth 2 (two) times daily. 120 tablet 0    Calcium Carbonate-Vitamin D (OSCAL 500/200 D-3 OR) Take 1 tablet by mouth daily.      B Complex-C (SUPER B COMPLEX OR) Take 1 tablet by mouth daily.      Multiple Vitamins-Minerals (CENTRUM SILVER) Oral Tab Take 1 tablet by mouth daily.      WARFARIN SODIUM OR Take by mouth. (Patient not taking: Reported on 6/18/2024)      Boswellia Gee (BOSWELLIA OR) Take by mouth daily. (Patient not taking: Reported on 6/18/2024)        Past Medical History:    DVT (deep venous thrombosis) (HCC)    Fracture dislocation of  left shoulder joint    w/ nerve injury 2009    Injury of nerve, shoulder or arm    w/ dislocation fx of L shoulder 2009    Nerve injury    nerve damage left hand 2009      Social History:  Social History     Socioeconomic History    Marital status:    Tobacco Use    Smoking status: Never    Smokeless tobacco: Never   Vaping Use    Vaping status: Never Used   Substance and Sexual Activity    Alcohol use: Yes     Comment: rarely 3 drinks a year    Drug use: No   Other Topics Concern    Caffeine Concern Yes     Comment: Coffee 2 cups daily        REVIEW OF SYSTEMS:   GENERAL HEALTH: feels well otherwise  SKIN: denies any unusual skin lesions or rashes  RESPIRATORY: denies shortness of breath with exertion  CARDIOVASCULAR: denies chest pain on exertion  GI: denies abdominal pain and denies heartburn  NEURO: denies headaches    EXAM:   /86   Pulse 103   Temp 98 °F (36.7 °C) (Oral)   Resp 16   Ht 5' (1.524 m)   Wt 102 lb 6.4 oz (46.4 kg)   SpO2 98%   BMI 20.00 kg/m²   GENERAL: well developed, well nourished,in no apparent distress  SKIN: no rashes,no suspicious lesions  HEENT: atraumatic, normocephalic,ears and throat are clear  NECK: supple,no adenopathy,no bruits  LUNGS: clear to auscultation  CARDIO: RRR without murmur  GI: good BS's,no masses, HSM or tenderness  EXTREMITIES: no cyanosis, clubbing or edema    ASSESSMENT AND PLAN:   1. Weakness  Slow improvement     2. Polymyalgia rheumatica (HCC)  Repeat sed rate     3. Hypercholesterolemia  Last labs at goal     4. Essential hypertension  At goal, same meds     5. Acute deep vein thrombosis (DVT) of femoral vein of left lower extremity (HCC)  Continue Eliquis at least 4 more months.   No etiology thus far to explain the unprovoked DVT of the left lower extremity.    6. Weight loss - I am ordering an upper and lower endoscopy.   Her weight loss seems to have stabilized and she now is developing an appetite.  CT of the chest abdomen and pelvis did  not yield any etiology.  Upper and lower endoscopy is planned.    7. Ground glass nodule RLL - repeat CT chest 1 yr    The patient indicates understanding of these issues and agrees to the plan.  The patient is asked to return in 2 mo

## 2024-06-19 ENCOUNTER — TELEPHONE (OUTPATIENT)
Dept: INTERNAL MEDICINE CLINIC | Facility: CLINIC | Age: 79
End: 2024-06-19

## 2024-06-20 ENCOUNTER — TELEPHONE (OUTPATIENT)
Dept: INTERNAL MEDICINE CLINIC | Facility: CLINIC | Age: 79
End: 2024-06-20

## 2024-06-20 NOTE — TELEPHONE ENCOUNTER
Patient called requesting to speak to Liv in regards to the prescription for Eliquis 5 MG tablets to be sent to a pharmacy in [place].      Dr. Kirkland referred patient to contact Liv

## 2024-06-21 NOTE — TELEPHONE ENCOUNTER
Spoke to pt - we discussed she already paid for eliquis;  recommend wait for 1 month to decide if pt tolerates and stays on med;  we reviewed the pharmacy process and can do that at a later date if pt still wants to proceed.

## 2024-06-24 ENCOUNTER — HOSPITAL ENCOUNTER (OUTPATIENT)
Dept: ULTRASOUND IMAGING | Facility: HOSPITAL | Age: 79
Discharge: HOME | End: 2024-06-24
Attending: OBSTETRICS & GYNECOLOGY

## 2024-06-24 DIAGNOSIS — N95.0 POSTMENOPAUSAL BLEEDING: ICD-10-CM

## 2024-06-24 PROCEDURE — 76830 TRANSVAGINAL US NON-OB: CPT | Performed by: OBSTETRICS & GYNECOLOGY

## 2024-06-24 PROCEDURE — 76856 US EXAM PELVIC COMPLETE: CPT | Performed by: OBSTETRICS & GYNECOLOGY

## 2024-06-28 ENCOUNTER — OFFICE VISIT (OUTPATIENT)
Dept: OBGYN CLINIC | Facility: CLINIC | Age: 79
End: 2024-06-28

## 2024-06-28 VITALS
SYSTOLIC BLOOD PRESSURE: 153 MMHG | HEART RATE: 109 BPM | BODY MASS INDEX: 20 KG/M2 | WEIGHT: 100.19 LBS | DIASTOLIC BLOOD PRESSURE: 83 MMHG

## 2024-06-28 DIAGNOSIS — N81.4 UTERINE PROLAPSE: ICD-10-CM

## 2024-06-28 DIAGNOSIS — N95.0 POSTMENOPAUSAL BLEEDING: Primary | ICD-10-CM

## 2024-06-28 PROCEDURE — A4562 PESSARY, NON RUBBER,ANY TYPE: HCPCS | Performed by: OBSTETRICS & GYNECOLOGY

## 2024-06-28 PROCEDURE — 99213 OFFICE O/P EST LOW 20 MIN: CPT | Performed by: OBSTETRICS & GYNECOLOGY

## 2024-06-29 NOTE — PROGRESS NOTES
Julissa Ferro is a 78 year old female  No LMP recorded. Patient is postmenopausal.   Chief Complaint   Patient presents with    Procedure     Endometrial Biopsy / Pessary Exchange     Last seen .  Here for follow up.       Pelvic ultrasound on 24--  e-stripe 1 mm.  Small amount of fluid in endometrial canal.  No  masses.  Normal ovaries    States she stopped bleeding since pessary was removed for the ultrasound      OBSTETRICS HISTORY:  OB History    Para Term  AB Living   1 1 0 1 0 2   SAB IAB Ectopic Multiple Live Births   0 0 0 1 2       GYNE HISTORY        MEDICAL HISTORY:  Past Medical History:    DVT (deep venous thrombosis) ([facility])    Fracture dislocation of left shoulder joint    w/ nerve injury     Injury of nerve, shoulder or arm    w/ dislocation fx of L shoulder     Nerve injury    nerve damage left hand      Past Surgical History:   Procedure Laterality Date    Colonoscopy N/A 8/10/2017    Procedure: COLONOSCOPY;  Surgeon: Sony Galvez MD;  [facility]: [facility] ENDOSCOPY    Tubal ligation         SOCIAL HISTORY:  Social History     Socioeconomic History    Marital status:    Tobacco Use    Smoking status: Never    Smokeless tobacco: Never   Vaping Use    Vaping status: Never Used   Substance and Sexual Activity    Alcohol use: Yes     Comment: rarely 3 drinks a year    Drug use: No   Other Topics Concern    Caffeine Concern Yes     Comment: Coffee 2 cups daily       MEDICATIONS:  Current Outpatient Medications   Medication Sig Dispense Refill    apixaban 5 MG Oral Tab Take 1 tablet (5 mg total) by mouth 2 (two) times daily. 120 tablet 0    Calcium Carbonate-Vitamin D (OSCAL 500/200 D-3 OR) Take 1 tablet by mouth daily.      B Complex-C (SUPER B COMPLEX OR) Take 1 tablet by mouth daily.      Multiple Vitamins-Minerals (CENTRUM SILVER) Oral Tab Take 1 tablet by mouth daily.      WARFARIN SODIUM OR Take by mouth. (Patient not taking: Reported on 2024)       Boswellia Gee (BOSWELLIA OR) Take by mouth daily. (Patient not taking: Reported on 6/18/2024)         ALLERGIES:  No Known Allergies      PHYSICAL EXAM:   /83   Pulse 109   Wt 100 lb 3.2 oz (45.5 kg)   BMI 19.57 kg/m²   Body mass index is 19.57 kg/m².    Constitutional: well developed, well nourished       Pelvic Exam:  External Genitalia: normal appearance, hair distribution, and no lesions  Urethral Meatus:  normal in size, location, without lesions and prolapse  Bladder:  No fullness, masses or tenderness  Vagina:  Normal appearance without lesions, no abnormal discharge  Cervix:  Normal without tenderness on motion  Uterus: normal in size, contour, position, mobility, without tenderness  Large cystocele with uterine prolapse      ENDOMETRIAL BIOPSY    After getting informed consent,  Speculum placed in vagina.   Cleaned with betadine.  Single tooth teneculum placed at 12:00.  Uterus sounded to 8cm.  1 passes were made with the endometrial pipelle obtaining no tissue, just minimal mucus.    Pt very uncomfortable but no tissue obtained.  E-bx terminated.  Specimen not sent to pathology due to lack of tissue      1. Postmenopausal bleeding  Will monitor.   Call if bleeding recurs.    2. Uterine prolapse  Pessary changed to Ring with support #3.  RTC 3 months for pessary check      Requested Prescriptions      No prescriptions requested or ordered in this encounter

## 2024-07-08 ENCOUNTER — TELEPHONE (OUTPATIENT)
Dept: INTERNAL MEDICINE CLINIC | Facility: CLINIC | Age: 79
End: 2024-07-08

## 2024-07-08 NOTE — TELEPHONE ENCOUNTER
Patient left a voicemail message;     Requests call back from Liv to discuss getting her Eliquis prescription from [place]

## 2024-07-11 NOTE — TELEPHONE ENCOUNTER
5. Acute deep vein thrombosis (DVT) of femoral vein of left lower extremity (HCC)  Continue Eliquis at least 4 more months.    Spoke to pt - we reviewed process;  she will set up account  Rx faxed to [facility]  [phone] on 7/12/24

## 2024-07-30 ENCOUNTER — TELEPHONE (OUTPATIENT)
Dept: INTERNAL MEDICINE CLINIC | Facility: CLINIC | Age: 79
End: 2024-07-30

## 2024-07-30 NOTE — TELEPHONE ENCOUNTER
Patient called to request a pain medication as all her joints hurt and tylenol is not working.     Patient also stated that she is very weak.     Patient uses [facility] on [address] in [place].     Patient has an appointment already scheduled for 8/13/24.

## 2024-08-13 ENCOUNTER — LAB ENCOUNTER (OUTPATIENT)
Dept: LAB | Age: 79
End: 2024-08-13
Attending: INTERNAL MEDICINE
Payer: MEDICARE

## 2024-08-13 ENCOUNTER — OFFICE VISIT (OUTPATIENT)
Dept: INTERNAL MEDICINE CLINIC | Facility: CLINIC | Age: 79
End: 2024-08-13

## 2024-08-13 VITALS
WEIGHT: 96.81 LBS | SYSTOLIC BLOOD PRESSURE: 132 MMHG | HEART RATE: 113 BPM | DIASTOLIC BLOOD PRESSURE: 80 MMHG | HEIGHT: 60 IN | OXYGEN SATURATION: 95 % | BODY MASS INDEX: 19.01 KG/M2

## 2024-08-13 DIAGNOSIS — I10 ESSENTIAL HYPERTENSION: ICD-10-CM

## 2024-08-13 DIAGNOSIS — D64.9 ANEMIA, UNSPECIFIED TYPE: ICD-10-CM

## 2024-08-13 DIAGNOSIS — I82.412 ACUTE DEEP VEIN THROMBOSIS (DVT) OF FEMORAL VEIN OF LEFT LOWER EXTREMITY (HCC): ICD-10-CM

## 2024-08-13 DIAGNOSIS — M35.3 POLYMYALGIA RHEUMATICA (HCC): Primary | ICD-10-CM

## 2024-08-13 DIAGNOSIS — R22.43 LOCALIZED SWELLING OF BOTH LOWER LEGS: ICD-10-CM

## 2024-08-13 DIAGNOSIS — D50.0 IRON DEFICIENCY ANEMIA DUE TO CHRONIC BLOOD LOSS: ICD-10-CM

## 2024-08-13 LAB
ALBUMIN SERPL-MCNC: 3.6 G/DL (ref 3.2–4.8)
ALBUMIN/GLOB SERPL: 1.3 {RATIO} (ref 1–2)
ALP LIVER SERPL-CCNC: 113 U/L
ALT SERPL-CCNC: 12 U/L
ANION GAP SERPL CALC-SCNC: 10 MMOL/L (ref 0–18)
AST SERPL-CCNC: 40 U/L (ref ?–34)
BASOPHILS # BLD AUTO: 0.02 X10(3) UL (ref 0–0.2)
BASOPHILS NFR BLD AUTO: 0.5 %
BILIRUB SERPL-MCNC: 0.6 MG/DL (ref 0.2–1.1)
BUN BLD-MCNC: 16 MG/DL (ref 9–23)
BUN/CREAT SERPL: 28.6 (ref 10–20)
CALCIUM BLD-MCNC: 9.8 MG/DL (ref 8.7–10.4)
CHLORIDE SERPL-SCNC: 102 MMOL/L (ref 98–112)
CHOLEST SERPL-MCNC: 151 MG/DL (ref ?–200)
CO2 SERPL-SCNC: 26 MMOL/L (ref 21–32)
CREAT BLD-MCNC: 0.56 MG/DL
DEPRECATED HBV CORE AB SER IA-ACNC: 665.2 NG/ML
DEPRECATED RDW RBC AUTO: 48 FL (ref 35.1–46.3)
EGFRCR SERPLBLD CKD-EPI 2021: 93 ML/MIN/1.73M2 (ref 60–?)
EOSINOPHIL # BLD AUTO: 0.01 X10(3) UL (ref 0–0.7)
EOSINOPHIL NFR BLD AUTO: 0.3 %
ERYTHROCYTE [DISTWIDTH] IN BLOOD BY AUTOMATED COUNT: 15.9 % (ref 11–15)
FASTING PATIENT LIPID ANSWER: NO
FASTING STATUS PATIENT QL REPORTED: NO
GLOBULIN PLAS-MCNC: 2.8 G/DL (ref 2–3.5)
GLUCOSE BLD-MCNC: 89 MG/DL (ref 70–99)
HCT VFR BLD AUTO: 34.8 %
HDLC SERPL-MCNC: 33 MG/DL (ref 40–59)
HGB BLD-MCNC: 11.4 G/DL
IMM GRANULOCYTES # BLD AUTO: 0.02 X10(3) UL (ref 0–1)
IMM GRANULOCYTES NFR BLD: 0.5 %
IRON SATN MFR SERPL: 7 %
IRON SERPL-MCNC: 17 UG/DL
LDLC SERPL CALC-MCNC: 92 MG/DL (ref ?–100)
LYMPHOCYTES # BLD AUTO: 0.88 X10(3) UL (ref 1–4)
LYMPHOCYTES NFR BLD AUTO: 22.2 %
MCH RBC QN AUTO: 27.5 PG (ref 26–34)
MCHC RBC AUTO-ENTMCNC: 32.8 G/DL (ref 31–37)
MCV RBC AUTO: 83.9 FL
MONOCYTES # BLD AUTO: 0.25 X10(3) UL (ref 0.1–1)
MONOCYTES NFR BLD AUTO: 6.3 %
NEUTROPHILS # BLD AUTO: 2.79 X10 (3) UL (ref 1.5–7.7)
NEUTROPHILS # BLD AUTO: 2.79 X10(3) UL (ref 1.5–7.7)
NEUTROPHILS NFR BLD AUTO: 70.2 %
NONHDLC SERPL-MCNC: 118 MG/DL (ref ?–130)
OSMOLALITY SERPL CALC.SUM OF ELEC: 287 MOSM/KG (ref 275–295)
PLATELET # BLD AUTO: 367 10(3)UL (ref 150–450)
POTASSIUM SERPL-SCNC: 4 MMOL/L (ref 3.5–5.1)
PROT SERPL-MCNC: 6.4 G/DL (ref 5.7–8.2)
RBC # BLD AUTO: 4.15 X10(6)UL
SODIUM SERPL-SCNC: 138 MMOL/L (ref 136–145)
T3FREE SERPL-MCNC: 2.08 PG/ML (ref 2.4–4.2)
T4 FREE SERPL-MCNC: 1.2 NG/DL (ref 0.8–1.7)
TIBC SERPL-MCNC: 240 UG/DL (ref 250–425)
TRANSFERRIN SERPL-MCNC: 161 MG/DL (ref 250–380)
TRIGL SERPL-MCNC: 145 MG/DL (ref 30–149)
TSI SER-ACNC: 4.13 MIU/ML (ref 0.55–4.78)
VLDLC SERPL CALC-MCNC: 24 MG/DL (ref 0–30)
WBC # BLD AUTO: 4 X10(3) UL (ref 4–11)

## 2024-08-13 PROCEDURE — 80053 COMPREHEN METABOLIC PANEL: CPT

## 2024-08-13 PROCEDURE — 99214 OFFICE O/P EST MOD 30 MIN: CPT | Performed by: INTERNAL MEDICINE

## 2024-08-13 PROCEDURE — 83540 ASSAY OF IRON: CPT

## 2024-08-13 PROCEDURE — 80061 LIPID PANEL: CPT

## 2024-08-13 PROCEDURE — 36415 COLL VENOUS BLD VENIPUNCTURE: CPT

## 2024-08-13 PROCEDURE — 82728 ASSAY OF FERRITIN: CPT

## 2024-08-13 PROCEDURE — 84443 ASSAY THYROID STIM HORMONE: CPT

## 2024-08-13 PROCEDURE — 84466 ASSAY OF TRANSFERRIN: CPT

## 2024-08-13 PROCEDURE — 84481 FREE ASSAY (FT-3): CPT

## 2024-08-13 PROCEDURE — 85025 COMPLETE CBC W/AUTO DIFF WBC: CPT

## 2024-08-13 PROCEDURE — 84439 ASSAY OF FREE THYROXINE: CPT

## 2024-08-13 NOTE — TELEPHONE ENCOUNTER
Patient has not received Eliquis. Routed to Liv to assist. Julissa and Andres tried following up with pharmacy and were not able to get a call back.

## 2024-08-13 NOTE — PROGRESS NOTES
Julissa Ferro is a 79 year old female.  HPI:   She is here in f/I -  she continues to loose weight, down about 4 lbs in last 2 mo .  I ordered an upper and lower endoscopy at her last visit but she decided not to get it. No significant pain anywhere, appetite is poor, no alarm sx other than profound weight loss.     She had endometrial biopsy about 2 mo ago and results \"benign\" per patient - I do not have results.     Patient says she is eating but  says she eats small portions and never finishes.       SR: no chest pain or sob, no gu or gi sx.    BP Readings from Last 6 Encounters:   08/13/24 132/80   06/28/24 153/83   06/18/24 138/86   06/06/24 139/85   05/28/24 136/86   05/14/24 128/78     Wt Readings from Last 6 Encounters:   08/13/24 96 lb 12.8 oz (43.9 kg)   06/28/24 100 lb 3.2 oz (45.5 kg)   06/18/24 102 lb 6.4 oz (46.4 kg)   06/06/24 104 lb 12.8 oz (47.5 kg)   05/28/24 106 lb (48.1 kg)   05/14/24 109 lb 8 oz (49.7 kg)     Current Outpatient Medications   Medication Sig Dispense Refill    apixaban 5 MG Oral Tab Take 1 tablet (5 mg total) by mouth 2 (two) times daily. 168 tablet 0    traMADol 50 MG Oral Tab Take 1 tablet (50 mg total) by mouth every 8 (eight) hours as needed for Pain. 30 tablet 3    Boswellia Gee (BOSWELLIA OR) Take by mouth daily. (Patient not taking: Reported on 6/18/2024)      Calcium Carbonate-Vitamin D (OSCAL 500/200 D-3 OR) Take 1 tablet by mouth daily.      B Complex-C (SUPER B COMPLEX OR) Take 1 tablet by mouth daily.      Multiple Vitamins-Minerals (CENTRUM SILVER) Oral Tab Take 1 tablet by mouth daily.        Past Medical History:    DVT (deep venous thrombosis) ([facility])    Fracture dislocation of left shoulder joint    w/ nerve injury 2009    Injury of nerve, shoulder or arm    w/ dislocation fx of L shoulder 2009    Nerve injury    nerve damage left hand 2009      Social History:  Social History     Socioeconomic History    Marital status:    Tobacco Use     Smoking status: Never    Smokeless tobacco: Never   Vaping Use    Vaping status: Never Used   Substance and Sexual Activity    Alcohol use: Yes     Comment: rarely 3 drinks a year    Drug use: No   Other Topics Concern    Caffeine Concern Yes     Comment: Coffee 2 cups daily        REVIEW OF SYSTEMS:   GENERAL HEALTH: feels well otherwise  SKIN: denies any unusual skin lesions or rashes  RESPIRATORY: denies shortness of breath with exertion  CARDIOVASCULAR: denies chest pain on exertion  GI: denies abdominal pain and denies heartburn  NEURO: denies headaches    EXAM:   /80   Pulse 113   Ht 5' (1.524 m)   Wt 96 lb 12.8 oz (43.9 kg)   SpO2 95%   BMI 18.90 kg/m²   GENERAL: well developed, well nourished,in no apparent distress  SKIN: no rashes,no suspicious lesions  HEENT: atraumatic, normocephalic,ears and throat are clear  NECK: supple,no adenopathy,no bruits  LUNGS: clear to auscultation  CARDIO: RRR without murmur  GI: good BS's,no masses, HSM or tenderness  EXTREMITIES: no cyanosis, clubbing or edema    ASSESSMENT AND PLAN:   1. Polymyalgia rheumatica (HCC)  Myalgias are markedly improved - she is not taking steroids     2. Localized swelling of both lower legs  She continues on Eliquis     3. Iron deficiency anemia due to chronic blood loss  Labs pending     4. Essential hypertension    - CBC W Differential W Platelet [E]; Future  - Comp Metabolic Panel (14) [E]; Future  - Lipid Panel; Future  - Assay, Thyroid Stim Hormone; Future  - Iron And Tibc; Future  - Ferritin [E]; Future  - Free T3 (Triiodothryronine); Future  - Free T4, (Free Thyroxine); Future    5. Acute deep vein thrombosis (DVT) of femoral vein of left lower extremity (HCC)  Continue Eliquis     6. Anemia, unspecified type  Labs pending     - CBC W Differential W Platelet [E]; Future  - Comp Metabolic Panel (14) [E]; Future  - Lipid Panel; Future  - Assay, Thyroid Stim Hormone; Future  - Iron And Tibc; Future  - Ferritin [E]; Future  -  Free T3 (Triiodothryronine); Future  - Free T4, (Free Thyroxine); Future    7. Weight loss - I have again emphasized how important it is to establish a diagnosis and that we must do an upper and lower endoscopy.     The patient indicates understanding of these issues and agrees to the plan.  The patient is asked to return in 2 mo Dr Romero

## 2024-08-19 ENCOUNTER — TELEPHONE (OUTPATIENT)
Dept: INTERNAL MEDICINE CLINIC | Facility: CLINIC | Age: 79
End: 2024-08-19

## 2024-08-19 NOTE — TELEPHONE ENCOUNTER
Patient is calling back she was just waking up from a nap when she spoke to the nurse, she would like to review the results again with nursing    Phone [phone]

## 2024-08-19 NOTE — TELEPHONE ENCOUNTER
Rianna / [facility] is calling patient is scheduled for Colonoscopy & upper Endoscopy on 8/28    Patient is on Eliquis does Dr Kirkland want patient to hold or continue Eliquis?  If hold how many days?    Phone [phone]

## 2024-08-19 NOTE — TELEPHONE ENCOUNTER
Recent labs mild anemia as before and decreased iron and blood.  Proceed with upper and lower endoscopy as planned

## 2024-08-19 NOTE — TELEPHONE ENCOUNTER
Hold Eliquis for two days prior to endoscopy, hold the day of endoscopy and the day after and longer if any bleeding at discretion of endoscopist.

## 2024-08-19 NOTE — TELEPHONE ENCOUNTER
Spoke with Rianna. Relayed MD message. Rianna verbalized understanding. Rianna will contact pt with Eliquis instructions.

## 2024-09-13 ENCOUNTER — HOSPITAL ENCOUNTER (OUTPATIENT)
Dept: GENERAL RADIOLOGY | Facility: HOSPITAL | Age: 79
Discharge: HOME | End: 2024-09-13
Attending: SURGERY
Payer: MEDICARE

## 2024-09-13 DIAGNOSIS — Z53.8 PROCEDURE NOT CARRIED OUT FOR OTHER REASONS: ICD-10-CM

## 2024-09-13 PROCEDURE — 74270 X-RAY XM COLON 1CNTRST STD: CPT | Performed by: SURGERY

## 2024-09-22 ENCOUNTER — TELEPHONE (OUTPATIENT)
Age: 79
End: 2024-09-22

## 2024-09-22 NOTE — TELEPHONE ENCOUNTER
Hello,     The [facility] Navigation team has attempted to reach you regarding an order from Dr. Kirkland's office. We are reaching out in order to assist you in coordinating care and resources that may meet your needs. Please give our office a call at [phone]. For more immediate assistance you can contact our 24-hour help line at [phone]. We look forward to hearing from you soon.

## 2024-09-26 PROBLEM — R53.1 WEAKNESS GENERALIZED: Status: ACTIVE | Noted: 2024-01-01

## 2024-09-27 PROBLEM — E83.52 HYPERCALCEMIA: Status: ACTIVE | Noted: 2024-01-01

## 2024-09-27 PROBLEM — R62.7 FAILURE TO THRIVE IN ADULT: Status: ACTIVE | Noted: 2024-01-01

## 2024-09-27 PROBLEM — D64.9 ANEMIA: Status: ACTIVE | Noted: 2024-01-01

## 2024-09-27 NOTE — PLAN OF CARE
Problem: Patient Centered Care  Goal: Patient preferences are identified and integrated in the patient's plan of care  Description: Interventions:  - What would you like us to know as we care for you? From home with    - Provide timely, complete, and accurate information to patient/family  - Incorporate patient and family knowledge, values, beliefs, and cultural backgrounds into the planning and delivery of care  - Encourage patient/family to participate in care and decision-making at the level they choose  - Honor patient and family perspectives and choices  Outcome: Progressing     Problem: Patient/Family Goals  Goal: Patient/Family Long Term Goal  Description: Patient's Long Term Goal: Feel better    Interventions:  - Monitor vital signs  - Monitor appropriate labs  - Administer medications per order  - Pain management as needed  - Follow MD orders  - Diagnostics per orders  - Update / inform patient and family on plan of care  - Discharge planning    - See additional Care Plan goals for specific interventions  Outcome: Progressing  Goal: Patient/Family Short Term Goal  Description: Patient's Short Term Goal: Improve energy     Interventions:   Monitor vital signs  - Monitor appropriate labs  - Administer medications per order  - Pain management as needed  - Follow MD orders  - Diagnostics per orders  - Update / inform patient and family on plan of care  - Discharge planning      - See additional Care Plan goals for specific interventions  Outcome: Progressing     Problem: SAFETY ADULT - FALL  Goal: Free from fall injury  Description: INTERVENTIONS:  - Assess pt frequently for physical needs  - Identify cognitive and physical deficits and behaviors that affect risk of falls.  - [place] fall precautions as indicated by assessment.  - Educate pt/family on patient safety including physical limitations  - Instruct pt to call for assistance with activity based on assessment  - Modify environment to reduce  risk of injury  - Provide assistive devices as appropriate  - Consider OT/PT consult to assist with strengthening/mobility  - Encourage toileting schedule  Outcome: Progressing

## 2024-09-27 NOTE — ED QUICK NOTES
Orders for admission, patient is aware of plan and ready to go upstairs. Any questions, please call ED RN huy at extension [phone].     Patient Covid vaccination status: Fully vaccinated     COVID Test Ordered in ED: None    COVID Suspicion at Admission: N/A    Running Infusions:      Mental Status/LOC at time of transport: alert

## 2024-09-27 NOTE — PROGRESS NOTES
Progress Note     Julissa Ferro Patient Status:  Inpatient    1945 MRN [id number]   Location [facility] 5SW/SE Attending Brady Cole MD   Hosp Day # 0 PCP Jesus Kirkland MD     Chief Complaint:   Chief Complaint   Patient presents with    Fatigue         Subjective:   S: Patient seen and examined, chart reviewed.   Patient's  and daughter-in-law are at the bedside.  Patient appears frail and cachectic.  Patient reports extreme weight loss of over 40 pounds over the past year.  Presents with generalized weakness leukopenia and anemia.  Denies having seen hematology or oncology in the past.  Has a significant history of DVT and tubular adenomas.      Review of Systems:   10 point ROS completed and was negative, except for pertinent positive and negatives stated in subjective.                Objective:   Vital signs:  Temp:  [97.7 °F (36.5 °C)-99.2 °F (37.3 °C)] 97.7 °F (36.5 °C)  Pulse:  [] 99  Resp:  [15-31] 18  BP: (127-165)/() 127/67  SpO2:  [94 %-100 %] 100 %    Wt Readings from Last 6 Encounters:   24 94 lb (42.6 kg)   24 96 lb (43.5 kg)   24 96 lb 12.8 oz (43.9 kg)   24 100 lb 3.2 oz (45.5 kg)   24 102 lb 6.4 oz (46.4 kg)   24 104 lb 12.8 oz (47.5 kg)       Physical Exam:    General: No acute distress.  Cachectic and pale and weak  Respiratory: Clear to auscultation bilaterally. No wheezes. No rhonchi.  Cardiovascular: S1, S2. Regular rate and rhythm. No murmurs, rubs or gallops.   Abdomen: Soft, nontender, nondistended.  Positive bowel sounds. No rebound or guarding.  Neurologic: No focal neurological deficits.  Generalized weakness and frailty.  Musculoskeletal: Moves all extremities.  Extremities: No edema.  Skin there is no bruising or lesions noted    Results:   Diagnostic Data:      Labs:    Labs Last 24 Hours:   BMP     CBC    Other     Na 141 Cl 109 BUN 17 Glu 88   Hb 8.4   PTT - Procal -   K 3.6 CO2 26.0 Cr 0.67   WBC  3.3 >< .0  INR - CRP -   Renal Lytes Endo    Hct 27.2   Trop - D dim -   eGFR - Ca 10.7 POC Gluc  115    LFT   pBNP - Lactic -   eGFR AA - PO4 - A1c -   AST 32 APk 90 Prot 5.8  LDL -      Recent Labs   Lab 09/26/24 2006 09/27/24 0612   RBC 3.44* 3.12*   HGB 9.4* 8.4*   HCT 28.7* 27.2*   MCV 83.4 87.2   MCH 27.3 26.9   MCHC 32.8 30.9*   RDW 16.4* 16.4*   NEPRELIM 3.18 2.48   WBC 4.2 3.3*   .0 363.0       No results found for: \"PT\", \"INR\"    Recent Labs   Lab 09/26/24 2006 09/27/24 0612 09/27/24  1435   * 88  --    BUN 23 17  --    CREATSERUM 0.81 0.67  --    CA 12.2* 10.7*  --    ALB 3.1*  --   --     141  --    K 3.5 3.0* 3.6    109  --    CO2 30.0 26.0  --    ALKPHO 90  --   --    AST 32  --   --    ALT 8*  --   --    BILT 0.6  --   --    TP 5.8  --   --        No results for input(s): \"LEANDRA\", \"LIP\" in the last 168 hours.    Recent Labs   Lab 09/27/24  1235   B12 582       Medications:    apixaban  5 mg Oral BID    venlafaxine  37.5 mg Oral BID    pantoprazole  40 mg Oral QAM AC    potassium chloride  20 mEq Intravenous Once       Assessment & Plan:   ASSESSMENT / PLAN:   79-year-old female presents the emergency room with generalized weakness inability to ambulate and profound weight loss over the past 1 year of greater than 40 pounds.  Her labs are significant for hypercalcemia leukopenia and anemia.    Failure to thrive  Normocytic anemia  Iron deficiency  Hypercalcemia  Dehydration  Severe protein caloric malnutrition, BMI 17      IV fluids initiated  Will hold calcium supplements for now,  PTH levels are low.  Check PTH related protein levels   Check vitamin D levels   Transfuse for hemoglobin less than 6  Check fecal occult blood test  Start ferric gluconate 125 mg daily for 3 days  PT/OT for deconditioning  Encourage increase oral intake, nutrition to evaluate and treat.  CT of the chest abdomen and pelvis without contrast ordered compared to previous CAT scan of the chest  abdomen pelvis done May 2024 where axillary lymphadenopathy was detected       dvt prophylaxis: eliquis  code status: full code  dispo: home upon medical clearance    I personally reviewed the available laboratories, imaging including CT of the abdomen pelvis and chest from May 2024. I discussed/will discuss the case with  and daughter-in-law. I ordered laboratories, studies including CT of the chest abdomen and pelvis. I adjusted medications including continued apixaban.  Started Dilaudid for pain medical decision making high, risk is high.    >55min spent, >50% spent counseling and coordinating care in the form of educating pt/family and d/w consultants and staff. Most of the time spent discussing the above plan.     Estimated date of discharge: To be determined  Discharge is dependent on: Diagnosis being made in clinical improvement  At this point Ms. Ferro is expected to be discharge to: Home versus rehab    Plan of care discussed with  and daughter-in-law as well as patient    Brady Cole MD, FAAP, FACP  [facility] Hospitalist  I respond to Epic Chat and [facility] Connect

## 2024-09-27 NOTE — DIETARY NOTE
ADULT NUTRITION INITIAL ASSESSMENT      RECOMMENDATIONS TO MD: Recommend initiation of nutrition support ( Temporary TF) if po does not improve >50-65% in view of Severe Malnutrition with cachexia.     Initiated small frequent meals ( eating every 3 hours), added Oral Nutrition supplements and initiated Calorie count. Thiamine added.       Pt is at high nutrition risk.  Pt meets severe malnutrition criteria.      CRITERIA FOR MALNUTRITION DIAGNOSIS: Criteria for severe malnutrition diagnosis: chronic illness related to wt loss greater than 20% in 1 year, energy intake less than 75% for greater than 1 month, body fat severe depletion, and muscle mass severe depletion.     ADMITTING DIAGNOSIS:  Weakness generalized [R53.1]     PERTINENT PAST MEDICAL HISTORY:    Past Medical History:    DVT (deep venous thrombosis) ([facility]ly [facility])    Fracture dislocation of left shoulder joint    w/ nerve injury 2009    Injury of nerve, shoulder or arm    w/ dislocation fx of L shoulder 2009    Nerve injury    nerve damage left hand 2009    has a past surgical history that includes tubal ligation and colonoscopy (N/A, 8/10/2017).     PATIENT STATUS: Initial 09/27/24: Patient (pt) identified at Nutrition risk due to poor po and unintentional wt loss after the screening process.   Medical findings:  Failure to thrive, decreased appetite, poor oral intake, weight loss.   Upon visit, pt laying in bed, spouse and dtr in law at bedside. Reports ate oatmeal this am and sweet potatoes for lunch. RN reports pt with somewhat better intake. Diet hx/eval:    prolonged inadequate nutrition intake based on reported 2-3 very small meals, consuming 70% each meal. Occasional intake of Ensure, finishing an 8 oz bottle in 2 days. Wt eval:    44# or 31.2% significant wt loss over the past year based on usual wt 136# ( consistent with # in Sept 2023). Compared to current wt of 94#.  Nutrition findings:    progressive severe malnutrition with Cachexia, frailty  and low BMI of 17.6 kg/m2.   Suspect chewing difficulty d/t loss of facial muscle mass. Adjusted diet to soft diet for easy chewing, and Provision for Oral Nutrition supplements (ONS) is also indicated to support the patient’s nutritional needs. Calorie count started x 3 days and will post on Monday result of adequacy. B1 added. Ultimately, consider temporary Nutrition Support Therapy ( d/w pt & family and RN)  to Supplement oral intake and  improve nutritional status and gain wt.       FOOD/NUTRITION INTAKE ANALYSIS:    Current Appetite: Poor to fair and Improved  Current Intake:  80% of oatmeal at bfast, 50% of mashed sweet potatoes at lunch.   Current Intake Meeting Needs: No, but oral nutrition supplements (ONS) to maximize and Consider initiating NGT for supplemental feeds  Percent Meals Eaten (last 6 days)       Date/Time Percent Meals Eaten (%)    09/27/24 0828 80 %    09/27/24 1509 55 %           Food Allergies: No Known Food Allergies (NKFA)  Cultural/Ethnic/Anglican Preferences: None    GASTROINTESTINAL: +BM 9/27 Brown scant stool x 1, difficulty chewing, and Loss of appetite     MEDICATIONS: reviewed     sodium chloride 125 mL/hr at 09/27/24 0925      apixaban  5 mg Oral BID    venlafaxine  37.5 mg Oral BID    pantoprazole  40 mg Oral QAM AC    thiamine  100 mg Oral Daily       LABS: reviewed   Recent Labs     09/26/24 2006 09/27/24  0612 09/27/24  1435   * 88  --    BUN 23 17  --    CREATSERUM 0.81 0.67  --    CA 12.2* 10.7*  --     141  --    K 3.5 3.0* 3.6    109  --    CO2 30.0 26.0  --    OSMOCALC 293 293  --        NUTRITION RELATED PHYSICAL FINDINGS:  - Nutrition Focused Physical Exam (NFPE): severe depletion body fat orbital region and triceps region, moderate depletion muscle mass thigh region and calf region, and severe depletion muscle mass temple region, clavicle region, scapular region, shoulder region, and dorsal kramer region   - Fluid Accumulation: none  see RN  documentation for details  - Skin Integrity: at risk see RN documentation for details    ANTHROPOMETRICS:  HT: 154.9 cm (5' 1\")  WT: 42.6 kg (94 lb)   BMI: Body mass index is 17.76 kg/m².  BMI CLASSIFICATION: less than 19 kg/m2 - underweight  IBW: 105 lbs        90% IBW  Usual Body Wt: 136-138 lbs both reported by pt and c/w emr wt hx.       68% UBW  WEIGHT HISTORY:    Patient Weight(s) for the past 336 hrs:   Weight   09/27/24 0031 42.6 kg (94 lb)   09/26/24 2004 34.5 kg (76 lb)     Wt Readings from Last 10 Encounters:   09/27/24 42.6 kg (94 lb)   08/19/24 43.5 kg (96 lb)   08/13/24 43.9 kg (96 lb 12.8 oz)   06/28/24 45.5 kg (100 lb 3.2 oz)   06/18/24 46.4 kg (102 lb 6.4 oz)   06/06/24 47.5 kg (104 lb 12.8 oz)   05/28/24 48.1 kg (106 lb)   05/14/24 49.7 kg (109 lb 8 oz)   04/02/24 50.8 kg (112 lb)   03/26/24 51 kg (112 lb 6.4 oz)       NUTRITION DIAGNOSIS/PROBLEM:    Severe Malnutrition related to Chronic - Physiological causes resulting in anorexia or diminished intake  as evidenced by pt/family reported chronic inadequate nutrition intake despite use of ONS, 31.8% significant wt loss over the past year, severe Muscle mass loss and Adipose tissue depletion with Cachexia and frailty, low BMI of 17.7 kg/m2.     NUTRITION INTERVENTION:     NUTRITION PRESCRIPTION:   Estimated Nutrition needs: Dosing wt of 42.6 kg --wt taken on 9/27 .  Calories: 1700+ calories/day (40+ calories per kg Dosing wt)  Protein: 85 g protein/day (2.0 g protein/kg  Dosing wt)    - Diet:       Procedures    Low Fiber/Soft diet Low Fiber/Soft; Is Patient on Accuchecks? No      - RD Malnutrition Care Plan: Encouraged increased PO intake, Encouraged small frequent meals with emphasis on high calorie/high protein, Initiated ONS (oral nutritional supplements), Requested  with ordering meals, tray set up and/or feeding as needed, Ordered thiamin, and Arranged snacks. Meal and ONS intake every 3 hours.   - Medical Food Supplements-  Ensure Compact (220 calories/ 9 g protein each) BID Chocolate--added by RD,  Rational/Benefits discussed.  - Vitamin and mineral supplements: thiamin/ RD  - Feeding assistance: meal set up  - Nutrition education: Discussed importance of adequate energy and protein intake   - Coordination of nutrition care: collaboration with other providers  - Discharge and transfer of nutrition care to new setting or provider: monitor plans      MONITOR AND EVALUATE/NUTRITION GOALS:  - Food and Nutrient Intake:    Monitor: adequacy of PO intake and adequacy of supplement intake  - Food and Nutrient Administration:    Monitor: need for temporary enteral nutrition  - Anthropometric Measurement:    Monitor weight  - Nutrition Goals:    halt wt loss, regain wt as able, PO and supplement greater than 75% of needs, labs within acceptable limits, minimize lean body mass loss, prevent skin breakdown, support body systems, and Supplemental Nutrition Support -EN      RD will follow up.    Indira Sigala, RD, LDN, [facility]  Clinical Dietitian  [phone]

## 2024-09-27 NOTE — ED INITIAL ASSESSMENT (HPI)
Patient to ED via EMS from home c/o generalized weakness x2 weeks. Patient with decreased PO intake.     Patient usually walks with walker. AOx4 upon arrival to ED.     20G L wrist

## 2024-09-27 NOTE — CONSULTS
Hematology/Oncology Initial Consultation Note    Patient Name: Julissa Ferro  Medical Record Number: [id number]    YOB: 1945   Date of Consultation: 2024   Physician requesting consultation:     Reason for Consultation:  Julissa Ferro was seen today for the diagnosis of failure to thrive     =============================================================  History of Present Illness:          79-year-old female with a history significant for deep vein thrombus presents to the hospital with significant weakness.  Patient has been declining over the last 2 years.  There has been creased appetite, poor intake, weight loss of close to 50 pounds in the last 2 years.  Patient was unable to walk by herself which made him come into the hospital.    Anemic and had hypercalcemia.  Denies having any bony pains.  No fevers or chills.   Denies having any recurrent infections.  Patient had a CT of the chest abdomen pelvis in May that was negative for any disease process.  She had upper GI endoscopy that did not reveal any significant etiology.  Her colonoscopy was unable to be performed    TSH was normal.     No history of having any malignancy.  Denies having any exposure to chemicals or radiation.        Past Medical History:  Past Medical History:    DVT (deep venous thrombosis) (HCC)    Fracture dislocation of left shoulder joint    w/ nerve injury     Injury of nerve, shoulder or arm    w/ dislocation fx of L shoulder     Nerve injury    nerve damage left hand        Past Surgical History:   Procedure Laterality Date    Colonoscopy N/A 8/10/2017    Procedure: COLONOSCOPY;  Surgeon: Sony Galvez MD;  Location: [facility] ENDOSCOPY    Tubal ligation         Home Medications:  Current Facility-Administered Medications on File Prior to Encounter   Medication Dose Route Frequency Provider Last Rate Last Admin    [] acetaminophen (Tylenol) tab 650 mg  650 mg Oral Once PRN Sharif  ZACH Stanford        [] HYDROcodone-acetaminophen (Norco) 5-325 MG per tab 1 tablet  1 tablet Oral Once PRN Hien Olguin CRNA         Current Outpatient Medications on File Prior to Encounter   Medication Sig Dispense Refill    venlafaxine 37.5 MG Oral Tab Take 1 tablet (37.5 mg total) by mouth 2 (two) times daily. 60 tablet 11    apixaban 5 MG Oral Tab Take 1 tablet (5 mg total) by mouth 2 (two) times daily. 168 tablet 0    Boswellia Gee (BOSWELLIA OR) Take by mouth daily.      Calcium Carbonate-Vitamin D (OSCAL 500/200 D-3 OR) Take 1 tablet by mouth daily.      B Complex-C (SUPER B COMPLEX OR) Take 1 tablet by mouth daily.      Multiple Vitamins-Minerals (CENTRUM SILVER) Oral Tab Take 1 tablet by mouth daily.      traMADol 50 MG Oral Tab Take 1 tablet (50 mg total) by mouth every 8 (eight) hours as needed for Pain. (Patient not taking: Reported on 2024) 30 tablet 3       Current Inpatient Medications:  Inpatient Meds:   apixaban  5 mg Oral BID    venlafaxine  37.5 mg Oral BID    pantoprazole  40 mg Oral QAM AC    potassium chloride  40 mEq Intravenous Once    Followed by    potassium chloride  20 mEq Intravenous Once      sodium chloride 125 mL/hr at 24 0925       PRN Meds:    traMADol    ondansetron    acetaminophen    hydrALAzine    alum-mag hydroxide-simethicone    Allergies:   No Known Allergies    Psychosocial History:  Social History     Social History Narrative    Not on file     Social History     Socioeconomic History    Marital status:    Tobacco Use    Smoking status: Never    Smokeless tobacco: Never   Vaping Use    Vaping status: Never Used   Substance and Sexual Activity    Alcohol use: Yes     Comment: rarely 3 drinks a year    Drug use: No   Other Topics Concern    Caffeine Concern Yes     Comment: Coffee 2 cups daily     Social Determinants of Health     Food Insecurity: No Food Insecurity (2024)    Food Insecurity     Food Insecurity: Never true    Transportation Needs: No Transportation Needs (9/27/2024)    Transportation Needs     Lack of Transportation: No   Housing Stability: Low Risk  (9/27/2024)    Housing Stability     Housing Instability: No       Family Medical History:  Family History   Problem Relation Age of Onset    Alcohol and Other Disorders Associated Father     Diabetes Paternal Aunt     Diabetes Maternal Uncle     No Known Problems Son     No Known Problems Son     Breast Cancer Neg     Ovarian Cancer Neg        Review of Systems:  A 10-point ROS was done with pertinent positives and negative per the HPI    Vital Signs:  Height: 154.9 cm (5' 1\") (09/26 2004)  Weight: 42.6 kg (94 lb) (09/27 0031)  BSA (Calculated - sq m): 1.25 sq meters (09/26 2004)  Pulse: 99 (09/27 1013)  BP: 127/67 (09/27 1013)  Temp: 97.7 °F (36.5 °C) (09/27 0922)  Do Not Use - Resp Rate: --  SpO2: 100 % (09/27 0922)      Wt Readings from Last 6 Encounters:   09/27/24 42.6 kg (94 lb)   08/19/24 43.5 kg (96 lb)   08/13/24 43.9 kg (96 lb 12.8 oz)   06/28/24 45.5 kg (100 lb 3.2 oz)   06/18/24 46.4 kg (102 lb 6.4 oz)   06/06/24 47.5 kg (104 lb 12.8 oz)       ECOG PS: 3    Physical Examination:  General: Patient is alert and oriented, not in acute distress.  Temporal wasting emancipated  Psych: Mood and affect are appropriate  Eyes: EOMI, PERRL  ENT: Oropharynx is clear, no adenopathy  CV: Regular rate and rhythm, normal S1S2, no murmurs, no LE edema  Respiratory: Lungs clear to auscultation bilaterally  GI/Abd: Soft, non-tender with normoactive bowel sounds, no hepatosplenomegaly  Neurological: Grossly intact   Lymphatics: No palpable cervical, supraclavicular, axillary, or inguinal lymphadenopathy  Skin: no rashes or petechiae      Laboratory:  Recent Labs   Lab 09/26/24 2006 09/27/24  0612   WBC 4.2 3.3*   HGB 9.4* 8.4*   HCT 28.7* 27.2*   .0 363.0   MCV 83.4 87.2   RDW 16.4* 16.4*   NEPRELIM 3.18 2.48       Recent Labs   Lab 09/26/24 2006 09/27/24  0612     141   K 3.5 3.0*    109   CO2 30.0 26.0   BUN 23 17   CREATSERUM 0.81 0.67   * 88   CA 12.2* 10.7*   TP 5.8  --    ALB 3.1*  --    ALKPHO 90  --    AST 32  --    ALT 8*  --    BILT 0.6  --        No results for input(s): \"PT\", \"INR\", \"PTT\", \"FIB\" in the last 168 hours.    Imaging:    CT Chest showed non specific LN in mediastinum and axillary region.     Impression & Plan:     79-year-old female with significant weight loss and failure to thrive.      Normocytic anemia   Patient has been steadily declining over the past 2 years.  Her lab work revealed normocytic anemia with hemoglobin of 8.4  Prior white counts been relatively stable.  Noted to have lymphocytopenia.  Platelets are normal.  Saturation of 7 suggestive of iron deficiency anemia  - will send for vitamin B12, folate, copper and monoclonal protein evaluation given that patient has hypercalcemia . Check urine 24 hour protein   = Also check for LDH and haptoglobin.     Hypercalcemia  -PTH  noted to be low.  - send for Vit D, PTHrp  - Continue IV fluids.     Please consider obtaining a CT of the chest abdomen and pelvis.  Patient was noted to have a mildly enlarged mediastinal, axillary and retroperitoneal lymph node from May 2024.  Also lymphocytopenia.    -Blood testing for HIV    Mari Heart MD  Hematology/Medical Oncology

## 2024-09-27 NOTE — CM/SW NOTE
09/27/24 1600   CM/SW Referral Data   Referral Source Physician   Reason for Referral Discharge planning   Informant Patient;Spouse/Significant Other   Medical Hx   Does patient have an established PCP? Yes   Patient Info   Patient's Current Mental Status at Time of Assessment Oriented;Alert   Patient's Home Environment House   Patient lives with Spouse/Significant other   Patient Status Prior to Admission   Independent with ADLs and Mobility Yes   Services in place prior to admission DME/Supplies at home   Type of DME/Supplies Standard Walker;Straight Cane;Raised Toilet Seat   Discharge Needs   Anticipated D/C needs Subacute rehab;Home health care   Services Requested   Submitted to [facility] Yes   PASRR Level 1 Submitted Yes     SW received MDO for discharge planning. SW met with patient and  at bedside. Patient is alert louie oriented at time of assessment.  provided above information.  reports that patient and him live together at address on file. Patient receives ADLs help from . Patient has walker, cane, raised toliet seat at home. Patient has no hx of HH and [facility]. Anticipated therapy need: Gradual Rehabilitative Therapy.  and patient expressed that they will think about it, and may want HH. SW sent [facility] and HH referrals via aidin. PASRR level 1 screen submitted and completed and uploaded to [email or web address] referral, [facility] request sent, f2f placed.     Plan: Pending medical clearance, DC to TBD- [facility] - *3 MN/list/choice vs Home Health - *list/choice    SW/CM to remain available for support and/or discharge planning.     Gwendolyn Mcleod MSW, LSW   x [phone]

## 2024-09-27 NOTE — PLAN OF CARE
Problem: Patient Centered Care  Goal: Patient preferences are identified and integrated in the patient's plan of care  Description: Interventions:  - What would you like us to know as we care for you? From home with    - Provide timely, complete, and accurate information to patient/family  - Incorporate patient and family knowledge, values, beliefs, and cultural backgrounds into the planning and delivery of care  - Encourage patient/family to participate in care and decision-making at the level they choose  - Honor patient and family perspectives and choices  Outcome: Progressing     Problem: Patient/Family Goals  Goal: Patient/Family Long Term Goal  Description: Patient's Long Term Goal: Feel better    Interventions:  - Monitor vital signs  - Monitor appropriate labs  - Administer medications per order  - Pain management as needed  - Follow MD orders  - Diagnostics per orders  - Update / inform patient and family on plan of care  - Discharge planning    - See additional Care Plan goals for specific interventions  Outcome: Progressing  Goal: Patient/Family Short Term Goal  Description: Patient's Short Term Goal: Improve energy     Interventions:   Monitor vital signs  - Monitor appropriate labs  - Administer medications per order  - Pain management as needed  - Follow MD orders  - Diagnostics per orders  - Update / inform patient and family on plan of care  - Discharge planning      - See additional Care Plan goals for specific interventions  Outcome: Progressing     Problem: SAFETY ADULT - FALL  Goal: Free from fall injury  Description: INTERVENTIONS:  - Assess pt frequently for physical needs  - Identify cognitive and physical deficits and behaviors that affect risk of falls.  - [place] fall precautions as indicated by assessment.  - Educate pt/family on patient safety including physical limitations  - Instruct pt to call for assistance with activity based on assessment  - Modify environment to reduce  risk of injury  - Provide assistive devices as appropriate  - Consider OT/PT consult to assist with strengthening/mobility  - Encourage toileting schedule  Outcome: Progressing     Problem: GENITOURINARY - ADULT  Goal: Absence of urinary retention  Description: INTERVENTIONS:  - Assess patient’s ability to void and empty bladder  - Monitor intake/output and perform bladder scan as needed  - Follow urinary retention protocol/standard of care  - Consider collaborating with pharmacy to review patient's medication profile  - Implement strategies to promote bladder emptying  Outcome: Progressing     Problem: SKIN/TISSUE INTEGRITY - ADULT  Goal: Skin integrity remains intact  Description: INTERVENTIONS  - Assess and document risk factors for pressure ulcer development  - Assess and document skin integrity  - Monitor for areas of redness and/or skin breakdown  - Initiate interventions, skin care algorithm/standards of care as needed  Outcome: Progressing  Goal: Incision(s), wounds(s) or drain site(s) healing without S/S of infection  Description: INTERVENTIONS:  - Assess and document risk factors for pressure ulcer development  - Assess and document skin integrity  - Assess and document dressing/incision, wound bed, drain sites and surrounding tissue  - Implement wound care per orders  - Initiate isolation precautions as appropriate  - Initiate Pressure Ulcer prevention bundle as indicated  Outcome: Progressing     Problem: Impaired Functional Mobility  Goal: Achieve highest/safest level of mobility/gait  Description: Interventions:  - Assess patient's functional ability and stability  - Promote increasing activity/tolerance for mobility and gait  - Educate and engage patient/family in tolerated activity level and precautions  - When transferring patient, block weaker knee for safety  - Recommend use of chair position in bed 3 times per day  Outcome: Progressing     Admission from ED for weakness. IV fluids per order.  Monitoring vital signs- stable at this time. PT/OT and nutrition consult. Safety and fall precautions maintained- bed alarm on, bed locked in lowest position, call light within reach. Prompt incontinence care

## 2024-09-27 NOTE — OCCUPATIONAL THERAPY NOTE
OCCUPATIONAL THERAPY EVALUATION - INPATIENT     Room Number: 568/568-A  Evaluation Date: 9/27/2024  Type of Evaluation: Initial  Presenting Problem: Generalized weakness  Co-Morbidities: R lung nodule, myalgia, weakness, past DVT     Physician Order: IP Consult to Occupational Therapy  Reason for Therapy: ADL/IADL Dysfunction and Discharge Planning    OCCUPATIONAL THERAPY ASSESSMENT   Patient is a 79 year old female admitted 9/26/2024 for generalized weakness.  Prior to admission, patient's baseline is mod-max assist with ADLs, bed mobility, and transfers from spouse and mod I with RW for functional ambulation.  Patient is currently functioning below baseline with toileting, bathing, upper body dressing, lower body dressing, grooming, bed mobility, transfers, static sitting balance, dynamic sitting balance, and energy conservation strategies.  Patient is requiring contact guard assist, maximum assist, and dependent as a result of the following impairments: decreased functional strength, decreased endurance, pain, impaired static and dynamic sitting balance, decreased muscular endurance, and medical status. Occupational Therapy will continue to follow for duration of hospitalization.    Patient will benefit from continued skilled OT Services to promote return to prior level of function and safety with continuous assistance and gradual rehabilitative therapy    PLAN  OT Treatment Plan: Energy conservation/work simplification techniques;ADL training;Functional transfer training;Patient/Family education;Patient/Family training;Equipment eval/education;Compensatory technique education     OCCUPATIONAL THERAPY MEDICAL/SOCIAL HISTORY   Problem List  Principal Problem:    Weakness generalized    HOME SITUATION  Type of Home: House  Home Layout: Multi-level  Lives With: Spouse (spouse has a knee brace recent fx)  Toilet and Equipment: Toilet riser with arms  Shower/Tub and Equipment: Tub-shower combo; Other (Comment)  (currently sponge bathing from seated)  Other Equipment: None    Use of Assistive Device(s): RW for functional ambulation    Prior Level of [place]: Prior to admission pt required mod-max assist from spouse for ADLs, bed mobility, and functional transfers. Pt used RW for for functional mobility. Per pt's spouse, pt with decline in function and has been requiring increased assist for ADLs and mobility over the last few weeks.     SUBJECTIVE  \"I am so cold\"     OCCUPATIONAL THERAPY EXAMINATION    OBJECTIVE  Precautions: Bed/chair alarm  Fall Risk: High fall risk    PAIN ASSESSMENT  Rating: Unable to rate  Location: throughout body  Management Techniques: Activity promotion; Relaxation; Repositioning    ACTIVITY TOLERANCE  Pulse: 99 (105 EOB)        BP: 127/67 (131/71)  BP Location: Right arm  BP Method: Automatic  Patient Position: Lying    O2 SATURATIONS  Oxygen Therapy  SPO2% on Room Air at Rest: 98      RANGE OF MOTION   Upper extremity ROM is within functional limits     STRENGTH ASSESSMENT  Upper extremity strength is within functional limits     COORDINATION  Gross Motor: WFL   Fine Motor: WFL     ACTIVITIES OF DAILY LIVING ASSESSMENT  AM-PAC ‘6-Clicks’ Inpatient Daily Activity Short Form  How much help from another person does the patient currently need…  -   Putting on and taking off regular lower body clothing?: Total  -   Bathing (including washing, rinsing, drying)?: Total  -   Toileting, which includes using toilet, bedpan or urinal? : Total  -   Putting on and taking off regular upper body clothing?: A Lot  -   Taking care of personal grooming such as brushing teeth?: A Little  -   Eating meals?: A Little    AM-PAC Score:  Score: 11  Approx Degree of Impairment: 70.42%  Standardized Score (AM-PAC Scale): 29.04  CMS Modifier (G-Code): CL       BED MOBILITY  Supine to Sit : Dependent  Sit to Supine (OT): Dependent    BALANCE ASSESSMENT  Static Sitting: Maximum Assist (progresses to CGA)    FUNCTIONAL  ADL ASSESSMENT  Grooming Seated: Contact Guard Assist       Skilled Therapy Provided:   Pt received laying in bed with spouse present. Ed on role of OT, plan for session, bed mobility, and postioning. Pt verbalized understanding but overall limited this session due to fatigue and generalized pain. Pt agreeable to EOB activity.     Pt required dependent assist to complete supine<> sit EOB and max assist to initially maintain static sitting. Pt demonstrating slight R lean and verbalizing she feels like she will fall to left. With seated rest break and assist with hand positioning/foot placement on floor pt able to progress to CGA EOB. Pt complained of dizziness upon sitting which improved with continued sitting. Vitals monitored and WNL. Pt completed grooming tasks seated EOB with CGA for maintenance of balance. Pt able to tolerate ~3 min static sitting before requesting to return to laying down.     EDUCATION PROVIDED  Patient: Role of Occupational Therapy; Plan of Care; Functional Transfer Techniques; Posture/Positioning; Proper Body Mechanics  Patient's Response to Education: Verbalized Understanding; Requires Further Education  Family/Caregiver: Role of Occupational Therapy; Plan of Care  Family/Caregiver's Response to Education: Verbalized Understanding    The patient's Approx Degree of Impairment: 70.42% has been calculated based on documentation in the [facility] '6 clicks' Inpatient Daily Activity Short Form.  Research supports that patients with this level of impairment may benefit from rehab.  Final disposition will be made by interdisciplinary medical team.     Patient End of Session: In bed;Needs met;Call light within reach;RN aware of session/findings;All patient questions and concerns addressed;Family present    OT Goals  Patients self stated goal is:      Patient will complete functional transfer with max assist  Comment:     Patient will complete toileting with max assist   Comment:     Patient will  tolerate sitting EOB for 5 minutes in prep for adls with SBA   Comment:     Comment:          Goals  on: 10/3/24  Frequency: 3x/week    Patient Evaluation Complexity Level:   Occupational Profile/Medical History LOW - Brief history including review of medical or therapy records    Specific performance deficits impacting engagement in ADL/IADL HIGH  5+ performance deficits    Client Assessment/Performance Deficits HIGH - Comorbidities and significant modifications of tasks    Clinical Decision Making MODERATE - Analysis of occupational profile, detailed assessments, several treatment options    Overall Complexity LOW     OT Session Time: 30 minutes  Self-Care Home Management: 30 minutes

## 2024-09-27 NOTE — H&P
[facility]  part of [facility]    History & Physical    Julissa Ferro Patient Status:  Emergency    1945 MRN [id number]   Location [facility] EMERGENCY DEPARTMENT Attending Izaiah Constantino MD   Hosp Day # 0 PCP Jesus Kirkland MD     Date:  2024  Date of Admission:  2024    Chief Complaint:  Chief Complaint   Patient presents with    Fatigue       History of Present Illness:  Julissa Ferro is a(n) 79 year old female, with a past medical history significant for tubular adenomas in the colon with repeat colonoscopy done last month however unable to go past the distal transverse colon presents today with poor appetite creasing weakness and loss of weight over the last few weeks.  Describes the onset as gradual with progressive worsening now requiring assistance with activities of daily living.  Claims she is unable to get up and move around on her own.  Denies any nausea or vomiting denies any diarrhea or constipation.  Denies any fever or chills denies any significant abdominal pain.  No dysuria    History:  Past Medical History:    DVT (deep venous thrombosis) ([facility])    Fracture dislocation of left shoulder joint    w/ nerve injury 2009    Injury of nerve, shoulder or arm    w/ dislocation fx of L shoulder 2009    Nerve injury    nerve damage left hand      Past Surgical History:   Procedure Laterality Date    Colonoscopy N/A 8/10/2017    Procedure: COLONOSCOPY;  Surgeon: Sony Galvez MD;  Location: [facility] ENDOSCOPY    Tubal ligation       Family History   Problem Relation Age of Onset    Alcohol and Other Disorders Associated Father     Diabetes Paternal Aunt     Diabetes Maternal Uncle     No Known Problems Son     No Known Problems Son     Breast Cancer Neg     Ovarian Cancer Neg       reports that she has never smoked. She has never used smokeless tobacco. She reports current alcohol use. She reports that she does not use drugs.    Allergies:  No Known  Allergies    Home Medications:  Prior to Admission Medications   Prescriptions Last Dose Informant Patient Reported? Taking?   B Complex-C (SUPER B COMPLEX OR)   Yes No   Sig: Take 1 tablet by mouth daily.   Boswellia Gee (BOSWELLIA OR)   Yes No   Sig: Take by mouth daily.   Calcium Carbonate-Vitamin D (OSCAL 500/200 D-3 OR)   Yes No   Sig: Take 1 tablet by mouth daily.   Multiple Vitamins-Minerals (CENTRUM SILVER) Oral Tab   Yes No   Sig: Take 1 tablet by mouth daily.   apixaban 5 MG Oral Tab   No No   Sig: Take 1 tablet (5 mg total) by mouth 2 (two) times daily.   traMADol 50 MG Oral Tab   No No   Sig: Take 1 tablet (50 mg total) by mouth every 8 (eight) hours as needed for Pain.   venlafaxine 37.5 MG Oral Tab   No No   Sig: Take 1 tablet (37.5 mg total) by mouth 2 (two) times daily.      Facility-Administered Medications: None       Review of Systems:  Constitutional:  Weakness, Fatigue.  Eye:  Negative.  Ear/Nose/Mouth/Throat:  Negative.  Respiratory:  Negative  Cardiovascular: Negative  Gastrointestinal: Poor appetite, weight loss  Genitourinary:  Negative  Endocrine:  Negative.  Immunologic:  Negative.  Musculoskeletal:  Negative.  Integumentary:  Negative.  Neurologic:  Negative.  Psychiatric:  Negative.  ROS reviewed as documented in chart    Physical Exam:  Temp:  [99.2 °F (37.3 °C)] 99.2 °F (37.3 °C)  Pulse:  [] 98  Resp:  [15-31] 31  BP: (152-164)/() 161/92  SpO2:  [94 %-97 %] 97 %    General:  Alert and oriented.  Diffuse skin problem:  None.  Eye:  Pupils are equal, round and reactive to light, extraocular movements are intact, Normal conjunctiva.  HENT:  Normocephalic, oral mucosa is moist.  Head:  Normocephalic, atraumatic.  Neck:  Supple, non-tender, no carotid bruit, no jugular venous distention, no lymphadenopathy, no thyromegaly.  Respiratory:  Lungs are clear to auscultation, respirations are non-labored, breath sounds are equal, symmetrical chest wall expansion.  Cardiovascular:   Normal rate, regular rhythm, no murmur, no edema.  Gastrointestinal:  Soft, non-tender, non-distended, normal bowel sounds, no organomegaly.  Lymphatics:  No lymphadenopathy neck, axilla, groin.  Musculoskeletal: Normal range of motion.  normal strength.  Feet:  Normal pulses.  Neurologic:  Alert, oriented, no focal deficits, cranial nerves II-XII are grossly intact.  Cognition and Speech:  Oriented, speech clear and coherent.  Psychiatric:  Cooperative, appropriate mood & affect.      Laboratory Data:   Lab Results   Component Value Date    WBC 4.2 09/26/2024    HGB 9.4 09/26/2024    HCT 28.7 09/26/2024    .0 09/26/2024    CREATSERUM 0.81 09/26/2024    BUN 23 09/26/2024     09/26/2024    K 3.5 09/26/2024     09/26/2024    CO2 30.0 09/26/2024     09/26/2024    CA 12.2 09/26/2024       Imaging:  No results found.     Assessment and Plan:    Clinical dehydration  IV fluids initiated, check orthostatic vitals.  Will continue to monitor closely.    Hypercalcemia  Will hold calcium supplements for now, check albumin, PTH levels along with vitamin D, IV fluids initiated we will continue to monitor repeat in AM.    Acute anemia  Unclear etiology, drop in hemoglobin from baseline of 11.4 to now 9.4, check stool for occult blood.  Will get iron studies as well, repeat levels in AM.    Generalized asthenia  Likely multifactorial, will treat above conditions, PT/OT for deconditioning    Severe protein calorie malnutrition  BMI 17.  Encourage increase oral intake, nutrition to evaluate and treat.    Prophylaxis  Subcutaneous heparin    CODE STATUS  Full    Primary care physician  Jesus Kirkland MD    60 minutes spent on this admission - examining patient, obtaining history, reviewing previous medical records, going over test results/imaging and discussing plan of care. All questions answered.     Disposition  Clinical course will dictate outcome      MEGHANN MCMILLAN MD  9/26/2024  10:36 PM

## 2024-09-27 NOTE — ED PROVIDER NOTES
Patient Seen in: [facility] Emergency Department    History     Chief Complaint   Patient presents with    Fatigue       HPI    History is provided by patient/independent historian: patient, EMS  79 year old female with no significant past medical history here with complaints of generalized weakness for the last 2 weeks.  She was doing okay until the last few days where she cannot even use her walker to walk around.  Her  has been assisting her to do her daily activities.  She does report decreased p.o. intake but no vomiting.  No urinary issues although history of UTI.  No fever, chest pain, shortness of breath, abdominal pain.  EMS reports vitals were stable.    History reviewed.   Past Medical History:    DVT (deep venous thrombosis) ([facility])    Fracture dislocation of left shoulder joint    w/ nerve injury 2009    Injury of nerve, shoulder or arm    w/ dislocation fx of L shoulder 2009    Nerve injury    nerve damage left hand 2009         History reviewed.   Past Surgical History:   Procedure Laterality Date    Colonoscopy N/A 8/10/2017    Procedure: COLONOSCOPY;  Surgeon: Sony Galvez MD;  Location: [facility] ENDOSCOPY    Tubal ligation           Home Medications reviewed :  (Not in a hospital admission)        History reviewed.   Social History     Socioeconomic History    Marital status:    Tobacco Use    Smoking status: Never    Smokeless tobacco: Never   Vaping Use    Vaping status: Never Used   Substance and Sexual Activity    Alcohol use: Yes     Comment: rarely 3 drinks a year    Drug use: No   Other Topics Concern    Caffeine Concern Yes     Comment: Coffee 2 cups daily         ROS  Review of Systems   Constitutional:  Positive for fatigue.   Respiratory:  Negative for shortness of breath.    Cardiovascular:  Negative for chest pain.   All other systems reviewed and are negative.     All other pertinent organ systems are reviewed and are negative.      Physical Exam     ED Triage  Vitals   BP 09/26/24 2004 (!) 164/102   Pulse 09/26/24 2004 108   Resp 09/26/24 2004 19   Temp 09/26/24 2007 99.2 °F (37.3 °C)   Temp src 09/26/24 2007 Oral   SpO2 09/26/24 2004 96 %   O2 Device 09/26/24 2004 None (Room air)     Vital signs reviewed.      Physical Exam  Vitals and nursing note reviewed.   Cardiovascular:      Pulses: Normal pulses.   Pulmonary:      Effort: No respiratory distress.   Abdominal:      General: There is no distension.   Neurological:      Mental Status: She is alert.         ED Course       Labs:     Labs Reviewed   CBC WITH DIFFERENTIAL WITH PLATELET - Abnormal; Notable for the following components:       Result Value    RBC 3.44 (*)     HGB 9.4 (*)     HCT 28.7 (*)     RDW-SD 49.7 (*)     RDW 16.4 (*)     Lymphocyte Absolute 0.77 (*)     All other components within normal limits   BASIC METABOLIC PANEL (8) - Abnormal; Notable for the following components:    Glucose 115 (*)     BUN/CREA Ratio 28.4 (*)     Calcium, Total 12.2 (*)     All other components within normal limits   POCT GLUCOSE - Abnormal; Notable for the following components:    POC Glucose  115 (*)     All other components within normal limits   URINALYSIS WITH CULTURE REFLEX   RAINBOW DRAW LAVENDER   RAINBOW DRAW LIGHT GREEN   RAINBOW DRAW BLUE   RAINBOW DRAW GOLD         My EKG Interpretation: EKG    Rate, intervals and axes as noted on EKG Report.  Rate: 107  Rhythm: Sinus Rhythm  Reading: abnormal for rate, normal for rhythm, no acute ST changes           As reviewed and Interpreted by me      Imaging Results Available and Reviewed while in ED:   No results found.  Decision rules/scores evaluated: none      Diagnostic labs/tests considered but not ordered: CXR    ED Medications Administered:   Medications   sodium chloride 0.9 % IV bolus 1,000 mL (has no administration in time range)   sodium chloride 0.9 % IV bolus 1,000 mL (1,000 mL Intravenous New Bag 9/26/24 2016)                MDM       Medical Decision  Making      Differential Diagnosis: After obtaining the patient's history, performing the physical exam and reviewing the diagnostics, multiple initial diagnoses were considered based on the presenting problem including pneumonia, UTI, OCTAVIO, viral syndrome, deconditioning, failure to thrive    External document review: I personally reviewed available external medical records for any recent pertinent discharge summaries, testing, and procedures - the findings are as follows: 8/13/24 visit with Dr. Kirkland for polymyalgia rheumatica    Complicating Factors: The patient already  has a past medical history of DVT (deep venous thrombosis) ([facility]) (05/10/2024), Fracture dislocation of left shoulder joint, Injury of nerve, shoulder or arm, and Nerve injury. to contribute to the complexity of this ED evaluation.    Procedures performed: none    Discussed management with physician/appropriate source: Dr. Gutierrez    Considered admission/deescalation of care for: generalized weakness    Social determinants of health affecting patient care: none    Prescription medications considered: discussed continuing current medication regimen    The patient requires continuous monitoring for: generalized weakness    Shared decision making: discussed possible admission        Disposition and Plan     Clinical Impression:  1. Weakness generalized        Disposition:  Admit    Follow-up:  No follow-up provider specified.    Medications Prescribed:  Current Discharge Medication List          Hospital Problems       Present on Admission  Date Reviewed: 8/28/2024            ICD-10-CM Noted POA    * (Principal) Weakness generalized R53.1 9/26/2024 Unknown

## 2024-09-27 NOTE — SPIRITUAL CARE NOTE
Spiritual Care Visit Note    Patient Name: Julissa Ferro Date of Spiritual Care Visit: 24   : 1945 Primary Dx: Weakness generalized       Referred By: Referral From: Nurse           Visit Type/Summary:     - Spiritual Care: Consulted with RN prior to visit. Patient declined a  visit at this time.  remains available as needed for follow up.    Spiritual Care support can be requested via an Epic consult. For urgent/immediate needs, please contact the On Call  at: [place]: ext [phone]    Chaplain William.

## 2024-09-27 NOTE — PHYSICAL THERAPY NOTE
PHYSICAL THERAPY EVALUATION - INPATIENT     Room Number: 568/568-A  Evaluation Date: 9/27/2024  Type of Evaluation: Initial   Physician Order: PT Eval and Treat    Presenting Problem: Weakness generalized  Co-Morbidities : R lung nodule, myalgia, weakness, Past DVT  Reason for Therapy: Mobility Dysfunction and Discharge Planning    PHYSICAL THERAPY ASSESSMENT   Patient is a 79 year old female admitted 9/26/2024 for Weakness generalized.  Prior to admission, patient's baseline is mod indep with a RW for gait and ADL's spouse assist as needed.  Patient is currently functioning below baseline with bed mobility, transfers, gait, stair negotiation, maintaining seated position, standing prolonged periods, and performing household tasks.  Patient is requiring moderate assist as a result of the following impairments: decreased functional strength, decreased endurance/aerobic capacity, impaired sitting / standing balance, impaired coordination, decreased muscular endurance, difficulty maintaining precautions, and medical status.  Physical Therapy will continue to follow for duration of hospitalization.    Patient will benefit from continued skilled PT Services to promote return to prior level of function and safety with continuous assistance and gradual rehabilitative therapy .    PLAN  PT Treatment Plan: Bed mobility;Body mechanics;Endurance;Energy conservation;Patient education;Gait training;Range of motion;Strengthening;Transfer training;Balance training;Stoop training;Stair training;Family education  Rehab Potential : Good  Frequency (Obs): 5x/week    PHYSICAL THERAPY MEDICAL/SOCIAL HISTORY   History related to current admission: Julissa Ferro is a(n) 79 year old female, with a past medical history significant for tubular adenomas in the colon with repeat colonoscopy done last month however unable to go past the distal transverse colon presents today with poor appetite creasing weakness and loss of weight over the  last few weeks.  Describes the onset as gradual with progressive worsening now requiring assistance with activities of daily living.  Claims she is unable to get up and move around on her own.  Denies any nausea or vomiting denies any diarrhea or constipation.  Denies any fever or chills denies any significant abdominal pain.  No dysuria     Problem List  Principal Problem:    Weakness generalized      HOME SITUATION  Home Situation  Type of Home: House  Home Layout: Multi-level  Stairs to Enter : 6  Lives With: Spouse (spouse has a knee brace recent fx)  Patient Owned Equipment: Rolling walker     Prior Level of [place]: Lives with spouse mod indep with all mobility and ADL's. Spouse is in a knee brace right now with a recent RLE fx.     SUBJECTIVE  I feel so weak and cold I can barely stand up and I am not really able to walk right now. I would like to return to bed after my PT because I am so cold.     PHYSICAL THERAPY EXAMINATION   OBJECTIVE  Precautions: Bed/chair alarm  Fall Risk: High fall risk    WEIGHT BEARING RESTRICTION  Weight Bearing Restriction: None                PAIN ASSESSMENT  Rating: Unable to rate  Location: sore abd region with movement  Management Techniques: Activity promotion;Body mechanics;Breathing techniques;Relaxation;Repositioning    COGNITION  Overall Cognitive Status:  WFL - within functional limits    RANGE OF MOTION AND STRENGTH ASSESSMENT  Upper extremity ROM and strength are within functional limits   Lower extremity ROM is within functional limits   Lower extremity strength is within functional limits 3/5    BALANCE  Static Sitting: Fair  Dynamic Sitting: Fair -  Static Standing: Poor  Dynamic Standing: Poor -    ACTIVITY TOLERANCE  Pulse: 77  Heart Rate Source: Monitor  Resp: 18  BP: (!) 165/78  BP Location: Right arm  BP Method: Automatic  Patient Position: Sitting    O2 WALK  Oxygen Therapy  SPO2% on Room Air at Rest: 98  SPO2% Ambulation on Room Air: 97    AM-PAC  '6-Clicks' INPATIENT SHORT FORM - BASIC MOBILITY  How much difficulty does the patient currently have...  Patient Difficulty: Turning over in bed (including adjusting bedclothes, sheets and blankets)?: A Lot   Patient Difficulty: Sitting down on and standing up from a chair with arms (e.g., wheelchair, bedside commode, etc.): A Lot   Patient Difficulty: Moving from lying on back to sitting on the side of the bed?: A Lot   How much help from another person does the patient currently need...   Help from Another: Moving to and from a bed to a chair (including a wheelchair)?: A Lot   Help from Another: Need to walk in hospital room?: A Lot   Help from Another: Climbing 3-5 steps with a railing?: Total     AM-PAC Score:  Raw Score: 11   Approx Degree of Impairment: 72.57%   Standardized Score (AM-PAC Scale): 33.86   CMS Modifier (G-Code): CL    FUNCTIONAL ABILITY STATUS  Functional Mobility/Gait Assessment  Gait Assistance: Moderate assistance  Distance (ft): 5  Assistive Device:  (PT assist from the front)  Pattern: Shuffle (unsteady legs knee buckling with movement chair to follow)  Rolling: moderate assist  Supine to Sit: moderate assist  Sit to Supine: moderate assist  Sit to Stand: moderate assist    Exercise/Education Provided:  Bed mobility  Body mechanics  Energy conservation  Functional activity tolerated  Gait training  Posture  Strengthening  Lower therapeutic exercise:  Ankle pumps  Heel slides  LAQ  Transfer training    Skilled Therapy Provided: Pt ed with bed mobility with mod A to EOB. Pt ed with standing with mod A to stand with PT standing in front to prevent LE buckling. Pt ed with amb 5 steps at EOB with PT to facilitate balance and weight shifting with mod a. Pt was assisted back into bed at end of PT session for comfort because pt was reporting feeling cold and did not want to sit in a chair. Therex in bed per HEP x 5 reps. Pt present with weakness and deconditioning with limited standing balance and  gait. Current functional presentation and deficits are consistent with a need for GR with PT, OT recommended to regain maximal PLOF and safety. PT and spouse are motivated to return home with [facility] PT and spouse assist if medical and functional progress allow. Spouse is currently in a RLE knee brace secondary to a recent RLE fx and his ability to provide lift assist is limited.       The patient's Approx Degree of Impairment: 72.57% has been calculated based on documentation in the [facility] '6 clicks' Inpatient Basic Mobility Short Form.  Research supports that patients with this level of impairment may benefit from IP rehab with PT, OT as medical progress allows.  Final disposition will be made by interdisciplinary medical team.    Patient End of Session: Up in chair;Needs met;With  staff;Call light within reach;RN aware of session/findings;All patient questions and concerns addressed;Family present;Alarm set    CURRENT GOALS  Goals to be met by: 10/15/2024  Patient Goal Patient's self-stated goal is: Return home    Goal #1 Patient is able to demonstrate supine - sit EOB @ level: independent     Goal #1   Current Status    Goal #2 Patient is able to demonstrate transfers EOB to/from Chair/Wheelchair at assistance level: modified independent with walker - rolling     Goal #2  Current Status    Goal #3 Patient is able to ambulate 50 feet with assist device: walker - rolling at assistance level: modified independent   Goal #3   Current Status    Goal #4 Patient will negotiate 6 stairs/one curb w/ assistive device and supervision   Goal #4   Current Status    Goal #5 Patient to demonstrate independence with home activity/exercise instructions provided to patient in preparation for discharge.   Goal #5   Current Status    Goal #6    Goal #6  Current Status      Patient Evaluation Complexity Level:  History Low - no personal factors and/or co-morbidities   Examination of body systems Low -  addressing 1-2 elements    Clinical Presentation Low- Stable   Clinical Decision Making  Low Complexity     Gait Trainin minutes

## 2024-09-28 PROBLEM — F32.2 SEVERE MAJOR DEPRESSION, SINGLE EPISODE, WITHOUT PSYCHOTIC FEATURES (HCC): Status: ACTIVE | Noted: 2024-01-01

## 2024-09-28 NOTE — PROGRESS NOTES
Progress Note     Julissa Ferro Patient Status:  Inpatient    1945 MRN [id number]   Location [facility] 5SW/SE Attending Brady Cole MD   Hosp Day # 1 PCP Jesus Kirkland MD     Chief Complaint:   Chief Complaint   Patient presents with    Fatigue         Subjective:   S: Patient seen and examined, chart reviewed.   She has not talked to either of her identical twin boys in 1 yr and 3 yrs respectively.  She is despondent and this may be a reason for her FTT, anorexia and asthenia.  Psych consulted.     Her CT revealed no solid mass tumor.  She had distended bladder and hydronephrosis and hydroureter.  Urol consulted.       Review of Systems:   10 point ROS completed and was negative, except for pertinent positive and negatives stated in subjective.                Objective:   Vital signs:  Temp:  [97.5 °F (36.4 °C)-97.7 °F (36.5 °C)] 97.5 °F (36.4 °C)  Pulse:  [] 112  Resp:  [18-20] 20  BP: (157-195)/() 158/90  SpO2:  [93 %-97 %] 93 %    Wt Readings from Last 6 Encounters:   24 97 lb 12.8 oz (44.4 kg)   24 96 lb (43.5 kg)   24 96 lb 12.8 oz (43.9 kg)   24 100 lb 3.2 oz (45.5 kg)   24 102 lb 6.4 oz (46.4 kg)   24 104 lb 12.8 oz (47.5 kg)       Physical Exam:    General: No acute distress.   Respiratory: Clear to auscultation bilaterally. No wheezes. No rhonchi.  Cardiovascular: S1, S2. Regular rate and rhythm. No murmurs, rubs or gallops.   Abdomen: Soft, nontender, nondistended.  Positive bowel sounds. No rebound or guarding.  Neurologic: No focal neurological deficits.   Musculoskeletal: Moves all extremities.  Extremities: No edema.    Results:   Diagnostic Data:      Labs:    Labs Last 24 Hours:   BMP     CBC    Other     Na 144 Cl 113 BUN 16 Glu 108   Hb 9.4   PTT - Procal -   K 3.6 CO2 24.0 Cr 0.64   WBC 11.3 >< .0  INR - CRP -   Renal Lytes Endo    Hct 29.4   Trop - D dim -   eGFR - Ca 10.1 POC Gluc  -    LFT   pBNP - Lactic  -   eGFR AA - PO4 - A1c -   AST - APk - Prot -  LDL -      Recent Labs   Lab 09/26/24 2006 09/27/24 0612 09/28/24  0755   RBC 3.44* 3.12* 3.44*   HGB 9.4* 8.4* 9.4*   HCT 28.7* 27.2* 29.4*   MCV 83.4 87.2 85.5   MCH 27.3 26.9 27.3   MCHC 32.8 30.9* 32.0   RDW 16.4* 16.4* 17.1*   NEPRELIM 3.18 2.48 10.10*   WBC 4.2 3.3* 11.3*   .0 363.0 429.0       No results found for: \"PT\", \"INR\"    Recent Labs   Lab 09/26/24 2006 09/27/24 0612 09/27/24  1435 09/28/24  0755   * 88  --  108*   BUN 23 17  --  16   CREATSERUM 0.81 0.67  --  0.64   CA 12.2* 10.7*  --  10.1   ALB 3.1*  --   --   --     141  --  144   K 3.5 3.0* 3.6 3.6    109  --  113*   CO2 30.0 26.0  --  24.0   ALKPHO 90  --   --   --    AST 32  --   --   --    ALT 8*  --   --   --    BILT 0.6  --   --   --    TP 5.8  --   --   --        No results for input(s): \"LEANDRA\", \"LIP\" in the last 168 hours.    Recent Labs   Lab 09/27/24  1235 09/28/24  0755   MG  --  1.7   B12 582  --        No results for input(s): \"URINE\", \"CULTI\", \"BLDSMR\" in the last 168 hours.      CT CHEST+ABDOMEN+PELVIS(CPT=71250/62450)    Result Date: 9/27/2024  CONCLUSION:  1. No evidence of a primary malignancy. 2. Small amount of ascites.  Mild body wall edema. 3. Small pericardial effusion. 4. Marked distention of urinary bladder associated with mild to moderate bilateral hydronephrosis and mild hydroureter.  Findings may be related to reflux. 5. Nonobstructing bilateral renal calculi.  No ureteral calculus. 6. Colonic diverticulosis.  Moderate amount of stool in the colon. 7. Atherosclerotic vascular calcification including coronary artery calcification.    Dictated by (CST): Jomar Andre MD on 9/27/2024 at 8:39 PM     Finalized by (CST): Jomar Andre MD on 9/27/2024 at 8:58 PM               Pro-Calcitonin  No results for input(s): \"PCT\" in the last 168 hours.    Cardiac  No results for input(s): \"TROP\", \"PBNP\" in the last 168 hours.    Imaging: Imaging data  reviewed in [facility].    CT CHEST+ABDOMEN+PELVIS(CPT=71250/80886)    Result Date: 9/27/2024  CONCLUSION:  1. No evidence of a primary malignancy. 2. Small amount of ascites.  Mild body wall edema. 3. Small pericardial effusion. 4. Marked distention of urinary bladder associated with mild to moderate bilateral hydronephrosis and mild hydroureter.  Findings may be related to reflux. 5. Nonobstructing bilateral renal calculi.  No ureteral calculus. 6. Colonic diverticulosis.  Moderate amount of stool in the colon. 7. Atherosclerotic vascular calcification including coronary artery calcification.    Dictated by (CST): Jomar Andre MD on 9/27/2024 at 8:39 PM     Finalized by (CST): Jomar Andre MD on 9/27/2024 at 8:58 PM              Medications:    apixaban  5 mg Oral BID    venlafaxine  37.5 mg Oral BID    pantoprazole  40 mg Oral QAM AC    thiamine  100 mg Oral Daily    sodium ferric gluconate  125 mg Intravenous Daily       Assessment & Plan:   ASSESSMENT / PLAN:     79-year-old female presents the emergency room with generalized weakness inability to ambulate and profound weight loss over the past 1 year of greater than 40 pounds.  Her labs are significant for hypercalcemia leukopenia and anemia.     Failure to thrive  Severe Depression (situational) and asthenia - has not seen or talked to her twin boys in nearly 3 years, they have been shut off by them for specific reasons.  One, three years ago, for insulting comments and the other for unknown reasons and he has a hx of mental health problems (SI).   Urinary retention  Bilateral non-obstructing calculus.   Normocytic anemia  Iron deficiency  Hypercalcemia  Dehydration  Severe protein caloric malnutrition, BMI 17        IV fluids initiated  Will hold calcium supplements for now,  PTH levels are low.  Check PTH related protein levels   Check vitamin D levels   Transfuse for hemoglobin less than 6  Check fecal occult blood test  Start ferric gluconate 125 mg daily  for 3 days  PT/OT for deconditioning  Encourage increase oral intake, nutrition to evaluate and treat.  Consult psych - would stop venlaxifine and use another antidepressant.  Urology consult, will likely need urodynamics.         dvt prophylaxis: eliquis  code status: full code  dispo: home upon medical clearance     I personally reviewed the available laboratories, imaging including CT of the abdomen pelvis and chest from May 2024. I discussed/will discuss the case with  and daughter-in-law. I ordered laboratories, studies including CT of the chest abdomen and pelvis. I adjusted medications including continued apixaban.  Started Dilaudid for pain medical decision making high, risk is high.     >55min spent, >50% spent counseling and coordinating care in the form of educating pt/family and d/w consultants and staff. Most of the time spent discussing the above plan.      Estimated date of discharge: To be determined  Discharge is dependent on: Diagnosis being made in clinical improvement  At this point Ms. Ferro is expected to be discharge to: Home versus rehab     Plan of care discussed with  and patient, also psych and nurse        Brady Cole MD, FAAP, FACP  [facility] Hospitalist  I respond to [employer] Chat and [employer] Connect

## 2024-09-28 NOTE — PLAN OF CARE
AxOx4, forgetful, RA, No tele, Incont x2, MAX. 24hr urine collection to be sent down today 1157am. Bladder scan >1L, straight cathed 1100ml of urine. Urology consulted  PLAN: IVF @ 125/hr, PT/OT, Ortho BP daily, calorie count x 3, urology     Problem: Patient Centered Care  Goal: Patient preferences are identified and integrated in the patient's plan of care  Description: Interventions:  - What would you like us to know as we care for you? From home with    - Provide timely, complete, and accurate information to patient/family  - Incorporate patient and family knowledge, values, beliefs, and cultural backgrounds into the planning and delivery of care  - Encourage patient/family to participate in care and decision-making at the level they choose  - Honor patient and family perspectives and choices  Outcome: Progressing     Problem: Patient/Family Goals  Goal: Patient/Family Long Term Goal  Description: Patient's Long Term Goal: Feel better    Interventions:  - Monitor vital signs  - Monitor appropriate labs  - Administer medications per order  - Pain management as needed  - Follow MD orders  - Diagnostics per orders  - Update / inform patient and family on plan of care  - Discharge planning    - See additional Care Plan goals for specific interventions  Outcome: Progressing  Goal: Patient/Family Short Term Goal  Description: Patient's Short Term Goal: Improve energy     Interventions:   Monitor vital signs  - Monitor appropriate labs  - Administer medications per order  - Pain management as needed  - Follow MD orders  - Diagnostics per orders  - Update / inform patient and family on plan of care  - Discharge planning      - See additional Care Plan goals for specific interventions  Outcome: Progressing     Problem: SAFETY ADULT - FALL  Goal: Free from fall injury  Description: INTERVENTIONS:  - Assess pt frequently for physical needs  - Identify cognitive and physical deficits and behaviors that affect risk of  falls.  - [place] fall precautions as indicated by assessment.  - Educate pt/family on patient safety including physical limitations  - Instruct pt to call for assistance with activity based on assessment  - Modify environment to reduce risk of injury  - Provide assistive devices as appropriate  - Consider OT/PT consult to assist with strengthening/mobility  - Encourage toileting schedule  Outcome: Progressing     Problem: GENITOURINARY - ADULT  Goal: Absence of urinary retention  Description: INTERVENTIONS:  - Assess patient’s ability to void and empty bladder  - Monitor intake/output and perform bladder scan as needed  - Follow urinary retention protocol/standard of care  - Consider collaborating with pharmacy to review patient's medication profile  - Implement strategies to promote bladder emptying  Outcome: Progressing     Problem: SKIN/TISSUE INTEGRITY - ADULT  Goal: Skin integrity remains intact  Description: INTERVENTIONS  - Assess and document risk factors for pressure ulcer development  - Assess and document skin integrity  - Monitor for areas of redness and/or skin breakdown  - Initiate interventions, skin care algorithm/standards of care as needed  Outcome: Progressing  Goal: Incision(s), wounds(s) or drain site(s) healing without S/S of infection  Description: INTERVENTIONS:  - Assess and document risk factors for pressure ulcer development  - Assess and document skin integrity  - Assess and document dressing/incision, wound bed, drain sites and surrounding tissue  - Implement wound care per orders  - Initiate isolation precautions as appropriate  - Initiate Pressure Ulcer prevention bundle as indicated  Outcome: Progressing     Problem: Impaired Functional Mobility  Goal: Achieve highest/safest level of mobility/gait  Description: Interventions:  - Assess patient's functional ability and stability  - Promote increasing activity/tolerance for mobility and gait  - Educate and engage patient/family in  tolerated activity level and precautions  - Recommend use of total lift for transfers  Outcome: Progressing

## 2024-09-28 NOTE — CONSULTS
[facility]  part of Located within [facility]    Report of Consultation    Julissa Ferro Patient Status:  Inpatient    1945 MRN [id number]   Location [facility] 5SW/SE Attending Brady Cole MD   Hosp Day # 1 PCP Jesus Kirkland MD     Date of Admission:  2024  Date of Consult:  2024   Reason for Consultation:   Patient presented with generalized weakness, Brady Mclean MD requested psychiatric consult for evaluation and advice.    Consult Duration     The patient seen for initial psychiatric consult evaluation.   Record reviewed, communication with attending, communication with RN and patient seen face to face evaluation.    History of Present Illness:   Patient is a 79 year old   female with past medical history of HTN, osteopenia, DVT, and anorexia who was admitted to the hospital for Weakness generalized. The patient has been demonstrating generalized weakness for past two weeks,accompanied by anorexia, malaise, and weight loss of 50 pounds over past two years. Patient was unable to walk by herself using her walker.  Medical workup was negative for any malignancy.  Patient indicated for psych consult for evaluation and advise.     Per chart review, the patient presented to the hospital via EMS with . Patient's  realized patient was demonstrating excessive weakness and could not help patient walk and decided to call EMS.     The patient received the following psychotropic medications Venlafaxine 37.5mg      Labs and imaging reviewed: Hbg 9.4, Glucose 108, Mg 1.7        The patient seen today laying in bed, with  at bedside who remained in room for evaluaiton.     Patient reprots significant distress for past three years due to her son not speaking to her following conflict with son's wife. Patient has not spoken to other son since February. Patient reports that they have prevented the grandchildren to communicate with  them.  Has been stopping while patient became tearful during this conversation.    Patient states she feels she did everything for her children. She feels upset that they have treated her in this manner.      Patient reports loss of appetite over past few years with significant weight loss. Patient reports generalized weakness and slowness. Patient expressed understanding that her somatic symptoms are secondary to her ongoing depression.  Patient admitted lack of energy, lack of motivation, sense of hopelessness and helplessness.  Patient denied any active suicidal ideation otherwise admitted to some passive suicidal thoughts as to close her eyes and not wake up.    Patient's  in room during interview. He reports that he believes that patients symptoms are due to her depression. He reports that he has witnessed her wast away over the past few years.  He reported that patient apologized multiple times to the daughter-in-law but that did not change the outcome.  They both feel blocked from communicating with the grandchildren whom they used to support and attend all sport games and activities.    Patient states she and her  recently sought care with a therapist day before admission due to depressive mood. Patient and  understand they are depressed.     The patient is alert and oriented to person, place, date and condition. The patient answers questions and engages in appropriate conversation with no impairment in cognition or distortion in thought process.     The patient reporting feelings of hopelessness, helplessness, worthlessness, low mood, low energy, decreased motivation.    Provided patient with supportive psychotherapy including listening, emotional support, and encouragement.     The patient is not demonstrating any kei or psychosis  The patient denies auditory or visual hallucinations  The patient denies suicidal or homicidal ideation.    Patient started a week ago by her PCP on  Effexor 37.5 twice daily.    The patient has been demonstrating feelings of hopelessness, helplessness, worthlessness, low mood, low energy, decreased motivation with increased anxiety, worry, ruminations with impairment in sleep. The patient denies any suicidal ideations. The patient is agreeable to medications changes and to follow up with outpatient psychiatry providers.     Past Psychiatric/Medication History:  1. Prior diagnoses: none  2. Past psychiatric inpatient: none  3. Past outpatient history: [facility] behavioral health therapist Coretta Alba (LCSW)  4. Past suicide history: none  5. Medication history: Venlafaxine 37.5 mg    Social History:   The patient has been  for 58 years to Andres. They have two twin sons and 3 grandchildren.      Family History:  Alcoholism in father  Medical History:   Past Medical History  Past Medical History:    DVT (deep venous thrombosis) (HCC)    Fracture dislocation of left shoulder joint    w/ nerve injury 2009    Injury of nerve, shoulder or arm    w/ dislocation fx of L shoulder 2009    Nerve injury    nerve damage left hand 2009       Past Surgical History  Past Surgical History:   Procedure Laterality Date    Colonoscopy N/A 8/10/2017    Procedure: COLONOSCOPY;  Surgeon: Sony Galvez MD;  Location: [facility] ENDOSCOPY    Tubal ligation         Family History  Family History   Problem Relation Age of Onset    Alcohol and Other Disorders Associated Father     Diabetes Paternal Aunt     Diabetes Maternal Uncle     No Known Problems Son     No Known Problems Son     Breast Cancer Neg     Ovarian Cancer Neg        Social History  Social History     Socioeconomic History    Marital status:    Tobacco Use    Smoking status: Never    Smokeless tobacco: Never   Vaping Use    Vaping status: Never Used   Substance and Sexual Activity    Alcohol use: Yes     Comment: rarely 3 drinks a year    Drug use: No   Other Topics Concern    Caffeine Concern Yes      Comment: Coffee 2 cups daily     Social Determinants of Health     Food Insecurity: No Food Insecurity (9/27/2024)    Food Insecurity     Food Insecurity: Never true   Transportation Needs: No Transportation Needs (9/27/2024)    Transportation Needs     Lack of Transportation: No   Housing Stability: Low Risk  (9/27/2024)    Housing Stability     Housing Instability: No           Current Medications:  Current Facility-Administered Medications   Medication Dose Route Frequency    dextrose 5% infusion   Intravenous Continuous    apixaban (Eliquis) tab 5 mg  5 mg Oral BID    venlafaxine (Effexor) tab 37.5 mg  37.5 mg Oral BID    traMADol (Ultram) tab 50 mg  50 mg Oral Q8H PRN    ondansetron (Zofran) 4 MG/2ML injection 4 mg  4 mg Intravenous Q6H PRN    acetaminophen (Tylenol) tab 650 mg  650 mg Oral Q6H PRN    hydrALAzine (Apresoline) 20 mg/mL injection 10 mg  10 mg Intravenous Q4H PRN    alum-mag hydroxide-simethicone (Maalox) 200-200-20 MG/5ML oral suspension 30 mL  30 mL Oral QID PRN    pantoprazole (Protonix) DR tab 40 mg  40 mg Oral QAM AC    HYDROmorphone (Dilaudid) 1 MG/ML injection 0.5 mg  0.5 mg Intravenous Q2H PRN    Or    HYDROmorphone (Dilaudid) 1 MG/ML injection 1 mg  1 mg Intravenous Q4H PRN    thiamine (Vitamin B1) tab 100 mg  100 mg Oral Daily    sodium ferric gluconate (Ferrlecit) 125 mg in sodium chloride 0.9% 100mL IVPB premix  125 mg Intravenous Daily     Medications Prior to Admission   Medication Sig    venlafaxine 37.5 MG Oral Tab Take 1 tablet (37.5 mg total) by mouth 2 (two) times daily.    apixaban 5 MG Oral Tab Take 1 tablet (5 mg total) by mouth 2 (two) times daily.    Boswellia Gee (BOSWELLIA OR) Take by mouth daily.    Calcium Carbonate-Vitamin D (OSCAL 500/200 D-3 OR) Take 1 tablet by mouth daily.    B Complex-C (SUPER B COMPLEX OR) Take 1 tablet by mouth daily.    Multiple Vitamins-Minerals (CENTRUM SILVER) Oral Tab Take 1 tablet by mouth daily.    traMADol 50 MG Oral Tab Take 1  tablet (50 mg total) by mouth every 8 (eight) hours as needed for Pain. (Patient not taking: Reported on 9/27/2024)       Allergies  No Known Allergies    Review of Systems:   As by Admitting/Attending    Results:   Laboratory Data:  Lab Results   Component Value Date    WBC 11.3 (H) 09/28/2024    HGB 9.4 (L) 09/28/2024    HCT 29.4 (L) 09/28/2024    .0 09/28/2024    CREATSERUM 0.64 09/28/2024    BUN 16 09/28/2024     09/28/2024    K 3.6 09/28/2024     (H) 09/28/2024    CO2 24.0 09/28/2024     (H) 09/28/2024    CA 10.1 09/28/2024    ALB 3.1 (L) 09/26/2024    ALKPHO 90 09/26/2024    TP 5.8 09/26/2024    AST 32 09/26/2024    ALT 8 (L) 09/26/2024    T4F 1.2 08/13/2024    TSH 4.130 08/13/2024    ESRML 22 06/18/2024    CRP 4.60 (H) 01/31/2024    MG 1.7 09/28/2024    PHOS 3.6 06/24/2022    B12 582 09/27/2024         Imaging:  CT CHEST+ABDOMEN+PELVIS(CPT=71250/12826)    Result Date: 9/27/2024  CONCLUSION:  1. No evidence of a primary malignancy. 2. Small amount of ascites.  Mild body wall edema. 3. Small pericardial effusion. 4. Marked distention of urinary bladder associated with mild to moderate bilateral hydronephrosis and mild hydroureter.  Findings may be related to reflux. 5. Nonobstructing bilateral renal calculi.  No ureteral calculus. 6. Colonic diverticulosis.  Moderate amount of stool in the colon. 7. Atherosclerotic vascular calcification including coronary artery calcification.    Dictated by (CST): Jomar Andre MD on 9/27/2024 at 8:39 PM     Finalized by (CST): Jomar Andre MD on 9/27/2024 at 8:58 PM           Vital Signs:   Blood pressure 158/90, pulse 112, temperature 97.5 °F (36.4 °C), temperature source Oral, resp. rate 20, height 61\", weight 44.4 kg (97 lb 12.8 oz), SpO2 93%.    Mental Status Exam:   Appearance: Stated age female, in hospital gown, laying down in hospital bed. Cachetic.   Psychomotor: psychomotor retardation.  Severely lethargic with soft tone  speech.  Orientation: Alert and oriented to person, place, time and condition.  Gait: Not evaluated.  Attitude/Coorperation: Cooperative and attentive.  Otherwise extremely lethargic.  Behavior: Appropriate. Slowed, tearful  Speech: Regular rate and rhythm speech.slowed  Mood: Patient reporting depressed mood.  Affect: Dysphoric congruent with the mood, restricted.  Thought process: Linear and appropriate.  Thought content: Patient denies any suicidal or homicidal ideation.  Perceptions: Patient denies any auditory or visual hallucinations.  Concentration: Grossly intact.  Memory: Grossly intact.  Intellect: Average.  Judgment and Insight: Questionable.     Impression:     Major depressive disorder, single episode severe with melancholic features.      Weakness generalized    Hypercalcemia    Anemia    Failure to thrive in adult      Patient is a 79 year old   female with past medical history of HTN, osteopenia, DVT, and anorexia who was admitted to the hospital for Weakness generalized: The patient has been demonstrating generalized weakness for past two weeks,accompanied by anorexia, malaise, and weight loss of 50 pounds over past two years. Patient was unable to walk by herself using her walker.  Patient indicated for psych consult for evaluation and advise.    09/28/2024: Patient demonstrating chronic depression secondary to significant life stressor. Patient demonstrates restricted emotional capacity with predominant depressed affect. Depression manifesting as cachexia in patient secondary to decreased appetite, loss of motivation, and generalized weakness.     Discussed risk and benefit, acknowledging the current symptom and severity.  At this point, I would recommend the following approach:     Focus on safety  Focus on education and support.  Focus on insight orientation helping the patient understand diagnosis and treatment plan.  Stop Venlafaxine 37.5 mg.  Start Lexapro 5 mg nightly.  Start  Zyprexa 2.5 mg nightly.  Utilize Klonopin 0.25 mg p.o. 3 times daily as needed for anxiety.  Patient will benefit from follow-up MRI  Processed with patient at length, the initiation of the above psychotropic medications I advised the patient of the risks, benefits, alternatives and potential side effects. The patient consents to administration of the medications and understands the right to refuse medications at any time. The patient verbalized understanding.   Coordinate plan with team    Orders This Visit:  Orders Placed This Encounter   Procedures    CBC With Differential With Platelet    Basic Metabolic Panel (8)    Urinalysis with Culture Reflex    Basic Metabolic Panel (8)    CBC With Differential With Platelet    Ferritin    Iron And Tibc    Hepatic Function Panel (7)    UA Microscopic only, urine    PTH, Intact    Vitamin D    Vitamin D, 25-Hydroxy    Potassium    LDH    Copper, Serum    Monoclonal Protein Study    Vitamin B12    Folic Acid Serum (Folate)    Haptoglobin    Parathyroid Hormone-related Peptide (PTH-rP) ([employer])    Vitamin D    Bence Ferro protein, 24 Hour Urine    Protein, 24-Hr Urine    Bence Ferro protein, 24 Hour Urine    SERUM MONOCLONAL PROTEIN STUDY    Vitamin D, 1,25 Dihydroxy    Zinc, Plasma Or Serum    Basic Metabolic Panel (8)    CBC With Differential With Platelet    Magnesium    Magnesium    CEA    Carbohydrate Antigen 19-9    -II    Occult Blood Stool, Diagnostic    Urine Culture, Routine       Meds This Visit:  Requested Prescriptions      No prescriptions requested or ordered in this encounter       Xavier Herbert MD  9/28/2024    Note to Patient: The 21st Century Cures Act makes medical notes like these available to patients in the interest of transparency. However, be advised this is a medical document. It is intended as peer to peer communication. It is written in medical language and may contain abbreviations or verbiage that are unfamiliar. It may  appear blunt or direct. Medical documents are intended to carry relevant information, facts as evident, and the clinical opinion of the practitioner. This note may have been transcribed using a voice dictation system. Voice recognition errors may occur. This should not be taken to alter the content or meaning of this note.

## 2024-09-28 NOTE — PROGRESS NOTES
RRT    *See RRT Documentation Record*    Reason the RRT was called: Tachycardia, desaturation, needs o2, tachypnic   Assessment of patient leading up to RRT: High HR sustaining 140s, and desaturation  Interventions/Testing: EKG, CXR, Trops, BNP, D dimer, O2 applied, x1 ativan   Patient Outcome/Disposition: O2 improved, EKG showed Sinus Tachy,   Family Notified: yes  Name of family notified: Andres

## 2024-09-28 NOTE — PLAN OF CARE
Problem: Patient Centered Care  Goal: Patient preferences are identified and integrated in the patient's plan of care  Description: Interventions:  - What would you like us to know as we care for you? From home with    - Provide timely, complete, and accurate information to patient/family  - Incorporate patient and family knowledge, values, beliefs, and cultural backgrounds into the planning and delivery of care  - Encourage patient/family to participate in care and decision-making at the level they choose  - Honor patient and family perspectives and choices  Outcome: Progressing     Problem: Patient/Family Goals  Goal: Patient/Family Long Term Goal  Description: Patient's Long Term Goal: Feel better    Interventions:  - Monitor vital signs  - Monitor appropriate labs  - Administer medications per order  - Pain management as needed  - Follow MD orders  - Diagnostics per orders  - Update / inform patient and family on plan of care  - Discharge planning    - See additional Care Plan goals for specific interventions  Outcome: Progressing  Goal: Patient/Family Short Term Goal  Description: Patient's Short Term Goal: Improve energy     Interventions:   Monitor vital signs  - Monitor appropriate labs  - Administer medications per order  - Pain management as needed  - Follow MD orders  - Diagnostics per orders  - Update / inform patient and family on plan of care  - Discharge planning      - See additional Care Plan goals for specific interventions  Outcome: Progressing     Problem: SAFETY ADULT - FALL  Goal: Free from fall injury  Description: INTERVENTIONS:  - Assess pt frequently for physical needs  - Identify cognitive and physical deficits and behaviors that affect risk of falls.  - [place] fall precautions as indicated by assessment.  - Educate pt/family on patient safety including physical limitations  - Instruct pt to call for assistance with activity based on assessment  - Modify environment to reduce  risk of injury  - Provide assistive devices as appropriate  - Consider OT/PT consult to assist with strengthening/mobility  - Encourage toileting schedule  Outcome: Progressing     Problem: GENITOURINARY - ADULT  Goal: Absence of urinary retention  Description: INTERVENTIONS:  - Assess patient’s ability to void and empty bladder  - Monitor intake/output and perform bladder scan as needed  - Follow urinary retention protocol/standard of care  - Consider collaborating with pharmacy to review patient's medication profile  - Implement strategies to promote bladder emptying  Outcome: Progressing     Problem: SKIN/TISSUE INTEGRITY - ADULT  Goal: Skin integrity remains intact  Description: INTERVENTIONS  - Assess and document risk factors for pressure ulcer development  - Assess and document skin integrity  - Monitor for areas of redness and/or skin breakdown  - Initiate interventions, skin care algorithm/standards of care as needed  Outcome: Progressing  Goal: Incision(s), wounds(s) or drain site(s) healing without S/S of infection  Description: INTERVENTIONS:  - Assess and document risk factors for pressure ulcer development  - Assess and document skin integrity  - Assess and document dressing/incision, wound bed, drain sites and surrounding tissue  - Implement wound care per orders  - Initiate isolation precautions as appropriate  - Initiate Pressure Ulcer prevention bundle as indicated  Outcome: Progressing     Problem: Impaired Functional Mobility  Goal: Achieve highest/safest level of mobility/gait  Description: Interventions:  - Assess patient's functional ability and stability  - Promote increasing activity/tolerance for mobility and gait  - Educate and engage patient/family in tolerated activity level and precautions  - Recommend use of total lift for transfers  Outcome: Progressing     Patient is alert and oriented x 2-3, forgetful at times. She denies to be in any pain or discomfort at this time. No acute  changes at this time. Safety and fall precautions maintained, bed alarm on. Call light within reach. Frequent rounding by nursing staff.

## 2024-09-28 NOTE — PROGRESS NOTES
Hematology Oncology Progress Note    Patient Name: Julissa Ferro   YOB: 1945   Medical Record Number: [id number]   CSN: [id number]   Attending Physician: Ruchi Delgadillo MD     Subjective:  No acute events. Feels the same. Appears somnolent and poorly responsive to questions.  at the bedside.     Objective:  Vitals:  Vitals:    09/28/24 0542 09/28/24 0744 09/28/24 1100 09/28/24 1310   BP: (!) 195/105 157/88 158/90 (!) 182/99   BP Location: Left arm Right arm Right arm Right arm   Pulse: 102 119 112 112   Resp:  18 20 20   Temp:  97.7 °F (36.5 °C) 97.5 °F (36.4 °C)    TempSrc:  Oral Oral    SpO2: 95% 97% 93% 92%   Weight:       Height:           Current Medications:    Current Facility-Administered Medications:     dextrose 5% infusion, , Intravenous, Continuous    apixaban (Eliquis) tab 5 mg, 5 mg, Oral, BID    venlafaxine (Effexor) tab 37.5 mg, 37.5 mg, Oral, BID    traMADol (Ultram) tab 50 mg, 50 mg, Oral, Q8H PRN    ondansetron (Zofran) 4 MG/2ML injection 4 mg, 4 mg, Intravenous, Q6H PRN    acetaminophen (Tylenol) tab 650 mg, 650 mg, Oral, Q6H PRN    hydrALAzine (Apresoline) 20 mg/mL injection 10 mg, 10 mg, Intravenous, Q4H PRN    alum-mag hydroxide-simethicone (Maalox) 200-200-20 MG/5ML oral suspension 30 mL, 30 mL, Oral, QID PRN    pantoprazole (Protonix) DR tab 40 mg, 40 mg, Oral, QAM AC    HYDROmorphone (Dilaudid) 1 MG/ML injection 0.5 mg, 0.5 mg, Intravenous, Q2H PRN **OR** HYDROmorphone (Dilaudid) 1 MG/ML injection 1 mg, 1 mg, Intravenous, Q4H PRN    thiamine (Vitamin B1) tab 100 mg, 100 mg, Oral, Daily    sodium ferric gluconate (Ferrlecit) 125 mg in sodium chloride 0.9% 100mL IVPB premix, 125 mg, Intravenous, Daily    Physical Examination:  General: Lethargic, cachectic   HEENT: EOMs intact. Oropharynx is clear.   Neck: No palpable lymphadenopathy. Neck is supple.  Chest: Clear to auscultation.  Heart: Regular rate  Abdomen: Soft, non tender  Extremities: No edema.  Neurological:  Grossly intact.   Psych: Flat affect    Labs:  Recent Labs   Lab 09/26/24 2006 09/27/24 0612 09/28/24  0755   RBC 3.44* 3.12* 3.44*   HGB 9.4* 8.4* 9.4*   HCT 28.7* 27.2* 29.4*   MCV 83.4 87.2 85.5   MCH 27.3 26.9 27.3   MCHC 32.8 30.9* 32.0   RDW 16.4* 16.4* 17.1*   NEPRELIM 3.18 2.48 10.10*   WBC 4.2 3.3* 11.3*   .0 363.0 429.0     Recent Labs   Lab 09/26/24 2006 09/27/24 0612 09/27/24  1435 09/28/24  0755   * 88  --  108*   BUN 23 17  --  16   CREATSERUM 0.81 0.67  --  0.64   EGFRCR 74 89  --  90   CA 12.2* 10.7*  --  10.1   ALB 3.1*  --   --   --     141  --  144   K 3.5 3.0* 3.6 3.6    109  --  113*   CO2 30.0 26.0  --  24.0   ALKPHO 90  --   --   --    AST 32  --   --   --    ALT 8*  --   --   --    BILT 0.6  --   --   --    TP 5.8  --   --   --       No results for input(s): \"PT\", \"INR\", \"PTT\" in the last 168 hours.     Radiology:  CT CHEST+ABDOMEN+PELVIS(CPT=71250/31382)    Result Date: 9/27/2024  CONCLUSION:  1. No evidence of a primary malignancy. 2. Small amount of ascites.  Mild body wall edema. 3. Small pericardial effusion. 4. Marked distention of urinary bladder associated with mild to moderate bilateral hydronephrosis and mild hydroureter.  Findings may be related to reflux. 5. Nonobstructing bilateral renal calculi.  No ureteral calculus. 6. Colonic diverticulosis.  Moderate amount of stool in the colon. 7. Atherosclerotic vascular calcification including coronary artery calcification.    Dictated by (CST): Jomar Andre MD on 9/27/2024 at 8:39 PM     Finalized by (CST): Jomar Andre MD on 9/27/2024 at 8:58 PM            Impression and Plan:    79-year-old female with significant weight loss and failure to thrive.     Normocytic anemia   - Hgb steadily declining over the past 2 years and now hemoglobin of 8.4 with mild lymphocytopenia. Platelets are normal.   - iron studies more suggestive of anemia of chronic disease but cannot rule out deficiency given very low  saturation. B12 and folate ok. TFT normal. FOBT negative.   - no lab evidence of hemolysis.   - copper, zinc, protein studies are pending.   - CT C/A/P with no evidence of malignancy, lymphadenopathy or abnormality to account for patient symptoms.   - continue oral iron supplements.  - agree with psych and dietitian consult.     Hypercalcemia  - PTH  noted to be low.  - Vit D, PTHrp, SPEP pending.   - discontinue supplements and continue IV fluids.            Ruchi Delgadillo MD   Hematology Oncology

## 2024-09-29 NOTE — CONSULTS
[facility], [place]  part of [facility]      Consult     Julissa Ferro Patient Status:  Inpatient    1945 MRN [id number]   Location [facility] 5SW/SE Attending Haylie Gonzalez MD   Hosp Day # 2 PCP Jesus Kirkland MD     Referring Provider: Kelsey  Reason for Consultation: bilateral hydronephrosis, urinary retention    Subjective:    Julissa Ferro is a 79 year old female with admission for hypercalcemia, malaise, and failure to thrive presents with CT abd pelvis demonstrating bilateral hydronephrosis, distended bladder and normal renal function.  She had a anaya placed after CIC drained 1100cc.  There is no family at bedside and patient cannot give history as she is mostly nonverbal.      She was sleeping and was able to be awoken but was not answering questions.    Labs and CT reviewed  Negative urine culture  CT demonstrates bilateral hydronephrosis down to the bladder with a distended bladder.  There is a pessary in the vagina noted on CT  Per record review, last placed by [facility] - 23 Dr. Etienen, but not sure when changed or washed last.          History/Other:      Past Medical History:  Past Medical History:    DVT (deep venous thrombosis) ([facility])    Fracture dislocation of left shoulder joint    w/ nerve injury 2009    Injury of nerve, shoulder or arm    w/ dislocation fx of L shoulder 2009    Nerve injury    nerve damage left hand         Past Surgical History:   Past Surgical History:   Procedure Laterality Date    Colonoscopy N/A 8/10/2017    Procedure: COLONOSCOPY;  Surgeon: Sony Galvez MD;  Location: [facility] ENDOSCOPY    Tubal ligation         Social History:  reports that she has never smoked. She has never used smokeless tobacco. She reports current alcohol use. She reports that she does not use drugs.    Family History:   Family History   Problem Relation Age of Onset    Alcohol and Other Disorders Associated Father     Diabetes Paternal  Aunt     Diabetes Maternal Uncle     No Known Problems Son     No Known Problems Son     Breast Cancer Neg     Ovarian Cancer Neg        Allergies: No Known Allergies    Medications:    No current facility-administered medications on file prior to encounter.     Current Outpatient Medications on File Prior to Encounter   Medication Sig Dispense Refill    venlafaxine 37.5 MG Oral Tab Take 1 tablet (37.5 mg total) by mouth 2 (two) times daily. 60 tablet 11    apixaban 5 MG Oral Tab Take 1 tablet (5 mg total) by mouth 2 (two) times daily. 168 tablet 0    Boswellia Gee (BOSWELLIA OR) Take by mouth daily.      Calcium Carbonate-Vitamin D (OSCAL 500/200 D-3 OR) Take 1 tablet by mouth daily.      B Complex-C (SUPER B COMPLEX OR) Take 1 tablet by mouth daily.      Multiple Vitamins-Minerals (CENTRUM SILVER) Oral Tab Take 1 tablet by mouth daily.      traMADol 50 MG Oral Tab Take 1 tablet (50 mg total) by mouth every 8 (eight) hours as needed for Pain. (Patient not taking: Reported on 9/27/2024) 30 tablet 3       Review of Systems:   Review of systems was completed.  Pertinent positives and negatives noted in the HPI.    Objective:     /59 (BP Location: Right arm)   Pulse 119   Temp 99.6 °F (37.6 °C) (Axillary)   Resp 20   Ht 61\"   Wt 97 lb 3.2 oz (44.1 kg)   SpO2 93%   BMI 18.37 kg/m²     General: sleeping, nonverbal, appears weak and frail  Respiratory: has 02, wet sounding breaths,   Abdomen: Soft, nontender, nondistended.    : anaya in draining clear.      Results:    Labs:  Laboratory data reviewed.      Selected labs - last 24 hours:  Endo  Lytes  Renal   Glu -  Na - Ca -  BUN -   POC Gluc  137  K - PO4 -  Cr -   A1c -  Cl - Mg 1.4  eGFR -   TSH -  CO2 -         LFT  CBC  Other   AST -  WBC -  PTT - Procal -   ALT -  Hb -  INR - CRP -   APk -  Hct -  Trop - D dim 2.80   T watson -  PLT -  pBNP -  BNP -  281 Ferritin  -   Prot -    CK  - Lactate  -   Alb -    LDL  - COVID  -       Imaging: Imaging data  reviewed in Epic.    Assessment & Plan:    #  80 yo female  Failure to thrive  Urinary retention  Bilateral hydronephrosis   Given weight loss, Scr likely not reflective of her true renal function   Consider nephrology consult given hypercalcemia     Urinary retention with reflux and hydronephrosis   Continue anaya catheter for now   Repeat renal ultrasound in 2 days to assess for improvement    Vaginal pessary   Needs GYN follow up for removal/washing/replacement   Can be a source for UTI   Urine culture was negative    Urology will follow peripherally.     Plan of care discussed with nursing      Drew Patterson MD  9/29/2024

## 2024-09-29 NOTE — SLP NOTE
ADULT SWALLOWING EVALUATION    ASSESSMENT    ASSESSMENT/OVERALL IMPRESSION:    Orders received, chart reviewed, clinical bedside swallow evaluation complete. Patient admitted from home with generalized weakness and weight loss, failure to thrive with severe depression; CXR 9/28/24 and 9/29/24 reviewed. No hx SLP service per this EMR review. RN authorizes visit, endorses acute development of wet/congested voice/cough and increased O2 needs since yesterday; previously tolerating soft diet and thin liquids with poor %PO intake. Patient's  at bedside, reports patient PO interest/intake steadily declining in last year resulting in significant weight loss.     Patient upright in bed, afebrile, O2 via NC, minimal engagement despite multimodal cues. Oral motor mechanism examination remarkable for generalized weakness, xerostomia, audible congestion, weak voice; note audible swallows for secretions with inconsistent throat clearing response. Patient allowed gentle oral care/hydration via damp oral swab to labial and anterior buccal/dental surfaces; unable to access lingual surface due to patient resistance. Patient then accepted two tiny tsp presentations of thin liquid water, several presentations met with resistance (keeping lips closed). Oral phase efficiency reduced with poor oral initiation and reduced/absent bolus formation resulting in some anterior loss and otherwise severely prolonged oral transit. Pharyngeal phase timing and control appears impaired with reduced laryngeal elevation per palpation. Patient baseline audible congestion persisted and O2 desaturations noted. RN aware, PO trials discontinued.     Patient presents with evidence oropharyngeal dysphagia at bedside in context of acuity of illness including respiratory compromise and comorbidities including failure to thrive and depression. At this time, given this presentation, a safe/functional PO diet cannot be recommended. Acute SLP service will  continue to follow closely while in house. Plan and recommendations reviewed with patient, patient's  and RN at bedside. Written recommendations posted on whiteboard. FCM score: 1/7.       RECOMMENDATIONS   Diet Recommendations - Solids: NPO  Diet Recommendations - Liquids: NPO  Medication Administration Recommendations: Non-oral  Treatment Plan/Recommendations: SLP to reassess    HISTORY   MEDICAL HISTORY  Reason for Referral: RN dysphagia screen;R/O aspiration    Problem List  Principal Problem:    Weakness generalized  Active Problems:    Hypercalcemia    Anemia    Failure to thrive in adult    Severe major depression, single episode, without psychotic features ([facility])    Past Medical History  Past Medical History:    DVT (deep venous thrombosis) ([facility])    Fracture dislocation of left shoulder joint    w/ nerve injury 2009    Injury of nerve, shoulder or arm    w/ dislocation fx of L shoulder 2009    Nerve injury    nerve damage left hand 2009        Diet Prior to Admission: Thin liquids;Regular  Precautions: Aspiration    Patient/Family Goals: Per patient's , \"I'm afraid she has PNA\"    SWALLOWING HISTORY  Current Diet Consistency: NPO  Dysphagia History: As above    Imaging Results:     CXR 9/29/24:  CONCLUSION:   Right greater than left bilateral basilar opacities increased on the right.  On the right, this may reflect atelectasis with or without superimposed pneumonia.  Left basilar opacity may reflect a combination of small effusion and parenchymal opacity.       OBJECTIVE     Dentition: Functional  Symmetry: Within Functional Limits  Strength: Unable to assess  Tone: Unable to assess  Range of Motion: Unable to assess  Rate of Motion: Reduced    Voice Quality: Weak  Respiratory Status: Supplemental O2;Nasal cannula  Consistencies Trialed: Thin liquids  Method of Presentation: Spoon;Staff/Clinician assistance  Patient Positioning: Upright;Midline    Oral Phase of Swallow: Impaired  Bolus  Retrieval: Impaired  Bilabial Seal: Impaired  Bolus Formation: Impaired  Bolus Propulsion: Impaired  Mastication: Impaired  Retention: Impaired    Pharyngeal Phase of Swallow: Impaired  Laryngeal Elevation Timing: Appears impaired  Laryngeal Elevation Strength: Appears impaired  Laryngeal Elevation Coordination: Appears impaired  (Please note: Silent aspiration cannot be evaluated clinically. Videofluoroscopic Swallow Study is required to rule-out silent aspiration.)    Esophageal Phase of Swallow: No complaints consistent with possible esophageal involvement      GOALS  Goal #1 Patient will participate in ongoing clinical bedside swallow evaluation as appropriate.    In Progress     FOLLOW UP  Treatment Plan/Recommendations: SLP to reassess  Number of Visits to Meet Established Goals: 3  Follow Up Needed (Documentation Required): Yes  SLP Follow-up Date: 09/30/24    Thank you for your referral.   If you have any questions, please contact Joselin Rodriguez, SLP  Joselin Rodriguez M.S. CCC-SLP  Speech-Language Pathologist  [phone]

## 2024-09-29 NOTE — PROGRESS NOTES
RRT    *See RRT Documentation Record*    Reason the RRT was called: Hypoxic, elevated HR   Assessment of patient leading up to RRT: Frequently deep suction, Spo2 84% 4L NC   Interventions/Testing: ABG's, CT chest stat, Lasix, metoprolol, Non re-breather, deep/nasal suction,   Patient Outcome/Disposition: Transfer to  after CT chest  Family Notified: yes  Name of family notified: Andres (spouse)

## 2024-09-29 NOTE — PROGRESS NOTES
RRT note    Brief HPI   Patient is a 57-year-old female with past medical history of multiple chronic conditions who was admitted to the hospital with with bilateral hydronephrosis and distended bladder.  RRT was called yesterday as well as again today for acute respiratory decompensation.    S: upon arrival at bedside patient appears to be in acute respiratory distress  is at bedside.  Responding to questions    O: VS reviewed.     General: Patient is alert and acute respiratory distress  HEENT: EOMI PERRLA, atraumatic normocephalic  Cardiac: S1-S2 appreciated  Lungs: Coarse breath sounds bilaterally, diminished air entry  Abdomen: Soft nontender nondistended positive bowel sounds  Neuro: No focal deficits noted  Psych: Normal mood    Global A/P  -Acute respiratory failure with hypoxia  -Possibly multifactorial in the setting of pneumonia fluid overload  -Ddimer is elevated plan for stat CTA chest to rule out PE.  -BNP Mildly elevated yesterday  -IV Lasix 20 mg x 1  -Discontinue fluids  -Transfer to CCU  -Patient is also tachycardic  -EKG and 2.5 mg IV Lopressor x 1  -Procalcitonin ordered  -started on IV zosyn for pneumonia.  -ABGs ordered.     Greater than 35 min of critical care time spent    Zully Quinonez MD

## 2024-09-29 NOTE — PLAN OF CARE
Problem: Patient Centered Care  Goal: Patient preferences are identified and integrated in the patient's plan of care  Description: Interventions:  - What would you like us to know as we care for you? From home with    - Provide timely, complete, and accurate information to patient/family  - Incorporate patient and family knowledge, values, beliefs, and cultural backgrounds into the planning and delivery of care  - Encourage patient/family to participate in care and decision-making at the level they choose  - Honor patient and family perspectives and choices  Outcome: Progressing     Problem: Patient/Family Goals  Goal: Patient/Family Long Term Goal  Description: Patient's Long Term Goal: Feel better    Interventions:  - Monitor vital signs  - Monitor appropriate labs  - Administer medications per order  - Pain management as needed  - Follow MD orders  - Diagnostics per orders  - Update / inform patient and family on plan of care  - Discharge planning    - See additional Care Plan goals for specific interventions  Outcome: Progressing  Goal: Patient/Family Short Term Goal  Description: Patient's Short Term Goal: Improve energy     Interventions:   Monitor vital signs  - Monitor appropriate labs  - Administer medications per order  - Pain management as needed  - Follow MD orders  - Diagnostics per orders  - Update / inform patient and family on plan of care  - Discharge planning      - See additional Care Plan goals for specific interventions  Outcome: Progressing     Problem: SAFETY ADULT - FALL  Goal: Free from fall injury  Description: INTERVENTIONS:  - Assess pt frequently for physical needs  - Identify cognitive and physical deficits and behaviors that affect risk of falls.  - [place] fall precautions as indicated by assessment.  - Educate pt/family on patient safety including physical limitations  - Instruct pt to call for assistance with activity based on assessment  - Modify environment to reduce  risk of injury  - Provide assistive devices as appropriate  - Consider OT/PT consult to assist with strengthening/mobility  - Encourage toileting schedule  Outcome: Progressing     Problem: GENITOURINARY - ADULT  Goal: Absence of urinary retention  Description: INTERVENTIONS:  - Assess patient’s ability to void and empty bladder  - Monitor intake/output and perform bladder scan as needed  - Follow urinary retention protocol/standard of care  - Consider collaborating with pharmacy to review patient's medication profile  - Implement strategies to promote bladder emptying  Outcome: Progressing     Problem: SKIN/TISSUE INTEGRITY - ADULT  Goal: Skin integrity remains intact  Description: INTERVENTIONS  - Assess and document risk factors for pressure ulcer development  - Assess and document skin integrity  - Monitor for areas of redness and/or skin breakdown  - Initiate interventions, skin care algorithm/standards of care as needed  Outcome: Progressing  Goal: Incision(s), wounds(s) or drain site(s) healing without S/S of infection  Description: INTERVENTIONS:  - Assess and document risk factors for pressure ulcer development  - Assess and document skin integrity  - Assess and document dressing/incision, wound bed, drain sites and surrounding tissue  - Implement wound care per orders  - Initiate isolation precautions as appropriate  - Initiate Pressure Ulcer prevention bundle as indicated  Outcome: Progressing     Problem: Impaired Functional Mobility  Goal: Achieve highest/safest level of mobility/gait  Description: Interventions:  - Assess patient's functional ability and stability  - Promote increasing activity/tolerance for mobility and gait  - Educate and engage patient/family in tolerated activity level and precautions  - Recommend use of  sharyn-walker for transfers and ambulation  Outcome: Progressing     Patient is lethargic, but easy to arouse. Pt has facial grimaces with movements. Observed dyspnea during at  not applicable/unable to verbalize/demonstrate rest, and elevated HR on 110s-120s, PRN Dilaudid provided. Safety and fall precautions maintained, bed alarm on. Call light within reach. Frequent rounding by nursing staff.

## 2024-09-29 NOTE — CONSULTS
[facility]  part of [facility]    Report of Consultation    Julissa Ferro Patient Status:  Inpatient    1945 MRN [id number]   Location [facility] 2W/SW Attending Haylie Gonzalez MD   Hosp Day # 2 PCP Jesus Kirkland MD     Date of Admission:  2024  Date of Consult: 2024    Reason for Consultation:   Consults  Respiratory failure     History provided by:patient and spouse  HPI:     Chief Complaint   Patient presents with    Fatigue     HPI    79-year-old female with history of DVT on Eliquis, HTN, iron deficiency anemia, prior history of polymyalgia rheumatica off steroid and reported doing well as an outpatient.    Patient with progressive weight loss and failure to thrive for unexplained reason  Admitted on 2024 with fatigue and weakness and urinary retention required Davidson catheter and abdomen and pelvic CT with no evidence of malignancy  Very poor oral intake  No reported vomiting or abdominal pain  Constipation  Today moved to ICU with respiratory failure with increased oxygen requirement and currently on 100% nonrebreather with O2 sat 85 to 86% with tachycardia and chest CT showed no PE but extensive bibasilar infiltrate and atelectasis  Patient placed on BiPAP  Patient on D5 dextrose IV fluid since no oral intake  Patient awake but lethargic not providing significant history especially now with her acute respiratory distress  No reported recent fever or chills or cough  Significant weight loss and almost bedbound recently  ABGs pH 7.47 and pCO2 of 32 and pO2 40        History     Past Medical History:    DVT (deep venous thrombosis) ([facility])    Fracture dislocation of left shoulder joint    w/ nerve injury 2009    Injury of nerve, shoulder or arm    w/ dislocation fx of L shoulder 2009    Nerve injury    nerve damage left hand      Past Surgical History:   Procedure Laterality Date    Colonoscopy N/A 8/10/2017    Procedure: COLONOSCOPY;  Surgeon:  Sony Galvez MD;  Location: [facility] ENDOSCOPY    Tubal ligation       Family History   Problem Relation Age of Onset    Alcohol and Other Disorders Associated Father     Diabetes Paternal Aunt     Diabetes Maternal Uncle     No Known Problems Son     No Known Problems Son     Breast Cancer Neg     Ovarian Cancer Neg      Social History:  Social History     Socioeconomic History    Marital status:    Tobacco Use    Smoking status: Never    Smokeless tobacco: Never   Vaping Use    Vaping status: Never Used   Substance and Sexual Activity    Alcohol use: Yes     Comment: rarely 3 drinks a year    Drug use: No   Other Topics Concern    Caffeine Concern Yes     Comment: Coffee 2 cups daily     Social Determinants of Health     Food Insecurity: No Food Insecurity (9/27/2024)    Food Insecurity     Food Insecurity: Never true   Transportation Needs: No Transportation Needs (9/27/2024)    Transportation Needs     Lack of Transportation: No   Housing Stability: Low Risk  (9/27/2024)    Housing Stability     Housing Instability: No     Allergies/Medications:   Allergies: No Known Allergies  Medications Prior to Admission   Medication Sig    venlafaxine 37.5 MG Oral Tab Take 1 tablet (37.5 mg total) by mouth 2 (two) times daily.    apixaban 5 MG Oral Tab Take 1 tablet (5 mg total) by mouth 2 (two) times daily.    Boswellia Gee (BOSWELLIA OR) Take by mouth daily.    Calcium Carbonate-Vitamin D (OSCAL 500/200 D-3 OR) Take 1 tablet by mouth daily.    B Complex-C (SUPER B COMPLEX OR) Take 1 tablet by mouth daily.    Multiple Vitamins-Minerals (CENTRUM SILVER) Oral Tab Take 1 tablet by mouth daily.    traMADol 50 MG Oral Tab Take 1 tablet (50 mg total) by mouth every 8 (eight) hours as needed for Pain. (Patient not taking: Reported on 9/27/2024)       Review of Systems:     Unable to perform ROS      Physical Exam:   Vital Signs:   height is 5' 1\" (1.549 m) and weight is 97 lb 3.2 oz (44.1 kg). Her oral temperature is  98.9 °F (37.2 °C). Her blood pressure is 137/86 and her pulse is 130 (abnormal). Her respiration is 25 and oxygen saturation is 100%.   Physical Exam  Vitals and nursing note reviewed.   Constitutional:       General: She is in acute distress.      Appearance: She is ill-appearing.      Comments: Cachectic patient   HENT:      Head: Atraumatic.      Nose: Nose normal.      Mouth/Throat:      Mouth: Mucous membranes are moist.   Eyes:      General: No scleral icterus.  Cardiovascular:      Rate and Rhythm: Regular rhythm. Tachycardia present.      Heart sounds:      No gallop.   Pulmonary:      Effort: Respiratory distress present.      Breath sounds: No stridor. Rales present. No wheezing or rhonchi.   Abdominal:      General: Bowel sounds are normal.      Palpations: Abdomen is soft.      Tenderness: There is no guarding or rebound.      Comments: Diminished bowel sounds   Musculoskeletal:      Cervical back: Neck supple. No rigidity.      Right lower leg: No edema.      Left lower leg: No edema.   Skin:     General: Skin is dry.   Neurological:      Comments: Lethargic but arousable and oriented time 1-2  Profound generalized fatigue and weakness         Results:     Lab Results   Component Value Date    WBC 17.3 (H) 09/29/2024    HGB 9.6 (L) 09/29/2024    HCT 29.1 (L) 09/29/2024    .0 09/29/2024    CREATSERUM 0.72 09/29/2024    BUN 19 09/29/2024     (L) 09/29/2024    K 3.5 09/29/2024     09/29/2024    CO2 22.0 09/29/2024     (H) 09/29/2024    CA 9.8 09/29/2024    ALB 3.1 (L) 09/26/2024    ALKPHO 90 09/26/2024    BILT 0.6 09/26/2024    TP 5.8 09/26/2024    AST 32 09/26/2024    ALT 8 (L) 09/26/2024    T4F 1.2 08/13/2024    TSH 4.130 08/13/2024    DDIMER 2.80 (H) 09/28/2024    ESRML 22 06/18/2024    CRP 4.60 (H) 01/31/2024    MG 1.4 (L) 09/29/2024    PHOS 3.6 06/24/2022    TROPHS 9 09/28/2024    B12 582 09/27/2024     CT CHEST PE AORTA (IV ONLY) (CPT=71260)    Result Date:  9/29/2024  CONCLUSION:   No evidence of pulmonary embolism to the bilateral segmental arterial level.  Small bilateral pleural effusions.  Near complete bilateral lower lobe consolidation which could reflect atelectasis with or without superimposed pneumonia.  Mild patchy ground-glass opacity in the superior segment right lower lobe and right upper lobe with mild reticulonodular opacity which may be infectious/inflammatory in etiology.  Small pericardial effusion.  Lesser incidental findings as above.    Dictated by (CST): Feng Carranza MD on 9/29/2024 at 1:10 PM     Finalized by (CST): Feng Carranza MD on 9/29/2024 at 1:16 PM          XR CHEST AP PORTABLE  (CPT=71045)    Result Date: 9/29/2024  CONCLUSION:  Right greater than left bilateral basilar opacities increased on the right.  On the right, this may reflect atelectasis with or without superimposed pneumonia.  Left basilar opacity may reflect a combination of small effusion and parenchymal opacity.       Dictated by (CST): Feng Carranza MD on 9/29/2024 at 11:20 AM     Finalized by (CST): Feng Carranza MD on 9/29/2024 at 11:22 AM          XR CHEST AP PORTABLE  (CPT=71045)    Result Date: 9/28/2024  CONCLUSION:   Left greater than right basilar opacities, which may reflect atelectasis with or without superimposed pneumonia.  Mild elevation of the right hemidiaphragm.    Dictated by (CST): Jay Jay Golden MD on 9/28/2024 at 4:11 PM     Finalized by (CST): Jay Jay Golden MD on 9/28/2024 at 4:13 PM          CT CHEST+ABDOMEN+PELVIS(CPT=71250/15009)    Result Date: 9/27/2024  CONCLUSION:  1. No evidence of a primary malignancy. 2. Small amount of ascites.  Mild body wall edema. 3. Small pericardial effusion. 4. Marked distention of urinary bladder associated with mild to moderate bilateral hydronephrosis and mild hydroureter.  Findings may be related to reflux. 5. Nonobstructing bilateral renal calculi.  No ureteral calculus. 6. Colonic  diverticulosis.  Moderate amount of stool in the colon. 7. Atherosclerotic vascular calcification including coronary artery calcification.    Dictated by (CST): Jomar Andre MD on 9/27/2024 at 8:39 PM     Finalized by (CST): Jomar Andre MD on 9/27/2024 at 8:58 PM         EKG 12 Lead    Result Date: 9/29/2024  Sinus tachycardia Left axis deviation Low voltage QRS, consider pulmonary disease, pericardial effusion, or normal variant Inferior infarct (cited on or before 26-SEP-2024) Possible Anterolateral infarct (cited on or before 12-DEC-2022) Abnormal ECG When compared with ECG of 26-SEP-2024 20:05, Non-specific change in ST segment in Anterior leads     Impression:     1-acute respiratory failure with hypoxia secondary to bilateral pneumonia /atelectasis  Chest CT no PE ; extensive basilar consolidation and atelectasis    Plan ;  Close monitoring in ICU and now on BiPAP with potential intubation  Aspiration precautions  IV Zosyn  Check cultures    2-failure to thrive with significant weight loss in the last year  Recently completely bedbound  Exact etiology is not clear with no evidence of malignancy    Nutrition support    3-urinary retention with bilateral hydro secondary to urine retention  Davidson in place and urology following    4-hypercalcemia  Secondary to immobility    5-reported history of polymyalgia rheumatica off steroid for a while  Check ESR    6-history of DVT on Eliquis    7-DVT prophylaxis  Patient on Eliquis    8-full code per family wishes    Critical, high risk and complex  > 35 min cct with pt                 Arcelia Julio MD  9/29/2024

## 2024-09-29 NOTE — PROGRESS NOTES
Progress Note     Julissa Ferro Patient Status:  Inpatient    1945 MRN [id number]   Location [facility] 5SW/SE Attending Brady Cole MD   Hosp Day # 2 PCP Jesus Kirkland MD     Chief Complaint:   Chief Complaint   Patient presents with    Fatigue         Subjective:   S: Patient seen and examined, chart reviewed.   This morning patient with worsening sob.  Transferred to PCU.  Placed on bipap and resting more comfortably now.     Review of Systems:   10 point ROS unable to be completed as patient on bipap and sleeping.                Objective:   Vital signs:  Temp:  [97.4 °F (36.3 °C)-99.6 °F (37.6 °C)] 98.9 °F (37.2 °C)  Pulse:  [106-147] 130  Resp:  [20-30] 25  BP: (101-195)/(54-96) 137/86  SpO2:  [84 %-100 %] 100 %    Wt Readings from Last 6 Encounters:   24 97 lb 3.2 oz (44.1 kg)   24 96 lb (43.5 kg)   24 96 lb 12.8 oz (43.9 kg)   24 100 lb 3.2 oz (45.5 kg)   24 102 lb 6.4 oz (46.4 kg)   24 104 lb 12.8 oz (47.5 kg)       Physical Exam:      Gen: now resting comfortably  Pulm: decreased bs  CV: sinus tachy  Abd: Abdomen soft, nontender, nondistended, bowel sounds present  Neuro: No acute focal deficits  MSK: moves extremities  Skin: Warm and dry  Psych: Normal affect  Ext: no cyanosis      Results:   Diagnostic Data:      Labs:    Labs Last 24 Hours:   BMP     CBC    Other     Na 134 Cl 104 BUN 19 Glu 109   Hb 9.6   PTT - Procal 0.27   K 3.5 CO2 22.0 Cr 0.72   WBC 17.3 >< .0  INR - CRP -   Renal Lytes Endo    Hct 29.1   Trop - D dim -   eGFR - Ca 9.8 POC Gluc  113    LFT   pBNP - Lactic -   eGFR AA - PO4 - A1c -   AST - APk - Prot -  LDL -      Recent Labs   Lab 24  0612 24  0755 24  1220   RBC 3.12* 3.44* 3.48*   HGB 8.4* 9.4* 9.6*   HCT 27.2* 29.4* 29.1*   MCV 87.2 85.5 83.6   MCH 26.9 27.3 27.6   MCHC 30.9* 32.0 33.0   RDW 16.4* 17.1* 17.0*   NEPRELIM 2.48 10.10* 16.06*   WBC 3.3* 11.3* 17.3*   .0 429.0 436.0        No results found for: \"PT\", \"INR\"    Recent Labs   Lab 09/26/24  2006 09/27/24  0612 09/27/24  1435 09/28/24  0755 09/29/24  0613   * 88  --  108* 109*   BUN 23 17  --  16 19   CREATSERUM 0.81 0.67  --  0.64 0.72   CA 12.2* 10.7*  --  10.1 9.8   ALB 3.1*  --   --   --   --     141  --  144 134*   K 3.5 3.0* 3.6 3.6 3.5    109  --  113* 104   CO2 30.0 26.0  --  24.0 22.0   ALKPHO 90  --   --   --   --    AST 32  --   --   --   --    ALT 8*  --   --   --   --    BILT 0.6  --   --   --   --    TP 5.8  --   --   --   --        No results for input(s): \"LEANDRA\", \"LIP\" in the last 168 hours.    Recent Labs   Lab 09/27/24  1235 09/28/24  0755 09/29/24  0613   MG  --  1.7 1.4*   B12 582  --   --        No results for input(s): \"URINE\", \"CULTI\", \"BLDSMR\" in the last 168 hours.      CT CHEST PE AORTA (IV ONLY) (CPT=71260)    Result Date: 9/29/2024  CONCLUSION:   No evidence of pulmonary embolism to the bilateral segmental arterial level.  Small bilateral pleural effusions.  Near complete bilateral lower lobe consolidation which could reflect atelectasis with or without superimposed pneumonia.  Mild patchy ground-glass opacity in the superior segment right lower lobe and right upper lobe with mild reticulonodular opacity which may be infectious/inflammatory in etiology.  Small pericardial effusion.  Lesser incidental findings as above.    Dictated by (CST): Feng Carranza MD on 9/29/2024 at 1:10 PM     Finalized by (CST): Feng Carranza MD on 9/29/2024 at 1:16 PM          XR CHEST AP PORTABLE  (CPT=71045)    Result Date: 9/29/2024  CONCLUSION:  Right greater than left bilateral basilar opacities increased on the right.  On the right, this may reflect atelectasis with or without superimposed pneumonia.  Left basilar opacity may reflect a combination of small effusion and parenchymal opacity.       Dictated by (CST): Feng Carranza MD on 9/29/2024 at 11:20 AM     Finalized by (CST): Feng Carranza  MD CRISTOBAL on 9/29/2024 at 11:22 AM          XR CHEST AP PORTABLE  (CPT=71045)    Result Date: 9/28/2024  CONCLUSION:   Left greater than right basilar opacities, which may reflect atelectasis with or without superimposed pneumonia.  Mild elevation of the right hemidiaphragm.    Dictated by (CST): Jay Jay Golden MD on 9/28/2024 at 4:11 PM     Finalized by (CST): Jay Jay Golden MD on 9/28/2024 at 4:13 PM               Pro-Calcitonin  Recent Labs   Lab 09/29/24  1219   PCT 0.27*       Cardiac  No results for input(s): \"TROP\", \"PBNP\" in the last 168 hours.    Imaging: Imaging data reviewed in [facility].    CT CHEST PE AORTA (IV ONLY) (CPT=71260)    Result Date: 9/29/2024  CONCLUSION:   No evidence of pulmonary embolism to the bilateral segmental arterial level.  Small bilateral pleural effusions.  Near complete bilateral lower lobe consolidation which could reflect atelectasis with or without superimposed pneumonia.  Mild patchy ground-glass opacity in the superior segment right lower lobe and right upper lobe with mild reticulonodular opacity which may be infectious/inflammatory in etiology.  Small pericardial effusion.  Lesser incidental findings as above.    Dictated by (CST): Feng Carranza MD on 9/29/2024 at 1:10 PM     Finalized by (CST): Feng Carranza MD on 9/29/2024 at 1:16 PM          XR CHEST AP PORTABLE  (CPT=71045)    Result Date: 9/29/2024  CONCLUSION:  Right greater than left bilateral basilar opacities increased on the right.  On the right, this may reflect atelectasis with or without superimposed pneumonia.  Left basilar opacity may reflect a combination of small effusion and parenchymal opacity.       Dictated by (CST): Feng Carranza MD on 9/29/2024 at 11:20 AM     Finalized by (CST): Feng Carranza MD on 9/29/2024 at 11:22 AM          XR CHEST AP PORTABLE  (CPT=71045)    Result Date: 9/28/2024  CONCLUSION:   Left greater than right basilar opacities, which may reflect atelectasis with or without  superimposed pneumonia.  Mild elevation of the right hemidiaphragm.    Dictated by (CST): Jay Jay Golden MD on 9/28/2024 at 4:11 PM     Finalized by (CST): Jay Jay Golden MD on 9/28/2024 at 4:13 PM              Medications:    piperacillin-tazobactam  3.375 g Intravenous Q8H    metoprolol        pantoprazole  40 mg Intravenous Daily    escitalopram  5 mg Oral Nightly    OLANZapine  2.5 mg Oral Nightly    lactulose  30 g Oral Once    apixaban  5 mg Oral BID    thiamine  100 mg Oral Daily       Assessment & Plan:   ASSESSMENT / PLAN:     79-year-old female presents the emergency room with generalized weakness inability to ambulate and profound weight loss over the past 1 year of greater than 40 pounds.  Her labs are significant for hypercalcemia leukopenia and anemia.     Acute hypoxemic respiratory failure  Possible Aspiration pneumonia  Transferred to PCU  Pulm/Crit Care consulted  Started IV zosyn  Cultures pending    Failure to thrive  Severe Depression (situational) and asthenia - has not seen or talked to her twin boys in nearly 3 years, they have been shut off by them for specific reasons.  One, three years ago, for insulting comments and the other for unknown reasons and he has a hx of mental health problems (SI).   Psych consulted, lito recs    Urinary retention  Bilateral non-obstructing calculus.   Seen by urology  Plan to cont anaya for now  Repeat renal us in 2 days to assess for improvement    Normocytic anemia  Iron deficiency  Heme/onc following  Transfuse for hemoglobin less than 6  Check fecal occult blood test  Start ferric gluconate 125 mg daily for 3 days    Hypercalcemia  Dehydration  Severe protein caloric malnutrition, BMI 17  IV fluids initiated - will decrease now   Will hold calcium supplements for now,  PTH levels are low.  Check PTH related protein levels   Check vitamin D levels   PT/OT for deconditioning        dvt prophylaxis: eliquis  code status: full code  dispo: home upon medical  clearance     MDM: high     Estimated date of discharge: To be determined  Discharge is dependent on: Diagnosis being made in clinical improvement  At this point Ms. Ferro is expected to be discharge to: Home versus rehab     Plan of care discussed with  and patient, also psych and nurse

## 2024-09-30 NOTE — SLP NOTE
ADULT SWALLOWING RE-EVALUATION    ASSESSMENT    ASSESSMENT/OVERALL IMPRESSION:  PPE REQUIRED. THIS THERAPIST WORE GLOVES, DROPLET MASK, AND GOGGLES FOR DURATION OF EVALUATION. HANDS WASHED UPON ENTRANCE/EXIT.    SLP BSSE orders received and acknowledged. A repeat swallow evaluation warranted as pt remains NPO following failed BSSE on 9/29/24. Pt afebrile with weak vocal quality, on 5L/Min, with oxygen saturation at 96. Pt positioned upright in bed with family at bedside. Pt with no complaints of pain, RN aware. Pt with adequate oral acceptance and impaired bilabial seal across trials as evidenced by anterior spillage. Pt with increased ANGEL. Pt's swallow response appears delayed with reduced hyolaryngeal elevation/excursion. Overt sign/symptoms of aspiration observed with all trials as evidenced by delayed coughing/throat clearing and wet vocal quality. Yankauer's suction utilized to remove remainder of bolus and secretions. Oral cares completed s/p trials. Oxygen status remained >94 t/o the entire evaluation.     At this time, pt presents with mild oral dysphagia and probable pharyngeal dysfunction. Recommend NPO with oral cares 3x/daily. Results and recommendations reviewed with RN, pt, and family. Pt/pt's family v/u to all results/recommendations. Recommendations remain written on whiteboard.     PLAN: SLP to re-evaluate to determine safest/least restrictive diet and/or further dysphagia goals.     RECOMMENDATIONS   Diet Recommendations - Solids: NPO  Diet Recommendations - Liquids: NPO      Compensatory Strategies Recommended:  (n/a)  Aspiration Precautions:  (n/a)  Medication Administration Recommendations: Non-oral  Treatment Plan/Recommendations: SLP to reassess    HISTORY   MEDICAL HISTORY  Reason for Referral: RN dysphagia screen;R/O aspiration    Problem List  Principal Problem:    Weakness generalized  Active Problems:    Hypercalcemia    Anemia    Failure to thrive in adult    Severe major depression,  single episode, without psychotic features ([facility])      Past Medical History  Past Medical History:    DVT (deep venous thrombosis) ([facility])    Fracture dislocation of left shoulder joint    w/ nerve injury 2009    Injury of nerve, shoulder or arm    w/ dislocation fx of L shoulder 2009    Nerve injury    nerve damage left hand 2009          Diet Prior to Admission: Thin liquids;Regular  Precautions: Aspiration    Patient/Family Goals: PO diet    SWALLOWING HISTORY  Current Diet Consistency: NPO  Dysphagia History: None prior to admission  Imaging Results:     CXR 9/30/24:  CONCLUSION:   1. Borderline cardiomegaly.  Tortuous aorta.   2. Bibasilar parenchymal abnormality and/or atelectatic changes, worse on the left.   3. Small bilateral effusions, greater on left.  Improved aeration right lung base..            Dictated by (CST): Adonis Issa MD on 9/30/2024 at 8:04 AM       Finalized by (CST): Adonis Issa MD on 9/30/2024 at 8:05 AM         OBJECTIVE   ORAL MOTOR EXAMINATION  Dentition: Functional  Symmetry: Within Functional Limits  Strength: Unable to assess  Tone: Unable to assess  Range of Motion: Unable to assess  Rate of Motion: Reduced    Voice Quality: Weak  Respiratory Status: Supplemental O2;Nasal cannula  Consistencies Trialed: Thin liquids;Nectar thick liquids;Puree  Method of Presentation: Staff/Clinician assistance;Spoon;Cup;Single sips  Patient Positioning: Upright;Midline    Oral Phase of Swallow: Impaired  Bolus Retrieval: Intact  Bilabial Seal: Impaired  Bolus Formation: Impaired  Bolus Propulsion: Impaired  Mastication: Impaired  Retention: Impaired    Pharyngeal Phase of Swallow: Impaired  Laryngeal Elevation Timing: Appears impaired  Laryngeal Elevation Strength: Appears impaired  Laryngeal Elevation Coordination: Appears impaired  (Please note: Silent aspiration cannot be evaluated clinically. Videofluoroscopic Swallow Study is required to rule-out silent aspiration.)    Esophageal  Phase of Swallow: No complaints consistent with possible esophageal involvement      GOALS  Goal #1 Patient will participate in ongoing clinical bedside swallow evaluation as appropriate.    In Progress     FOLLOW UP  Treatment Plan/Recommendations: SLP to reassess  Number of Visits to Meet Established Goals: 1  Follow Up Needed (Documentation Required): Yes  SLP Follow-up Date: 10/01/24    Thank you for your referral.   If you have any questions, please contact LEE Newby M.S. CCC-SLP  Speech Language Pathologist  Phone Number [phone]

## 2024-09-30 NOTE — PHYSICAL THERAPY NOTE
PHYSICAL THERAPY TREATMENT NOTE - INPATIENT     Room Number: 203/203-A       Presenting Problem: Weakness generalized  Co-Morbidities : R lung nodule, myalgia, weakness, Past DVT    Problem List  Principal Problem:    Weakness generalized  Active Problems:    Hypercalcemia    Anemia    Failure to thrive in adult    Severe major depression, single episode, without psychotic features (Formerly [facility])      PHYSICAL THERAPY ASSESSMENT   Patient demonstrates limited progress this session, goals  remain in progress.      Patient is requiring dependent as a result of the following impairments: decreased functional strength, decreased endurance/aerobic capacity, pain, impaired sitting balance, decreased muscular endurance, and medical status.     Patient continues to function below baseline with bed mobility, transfers, gait, stair negotiation, maintaining seated position, standing prolonged periods, and performing household tasks.  Next session anticipate patient to progress bed mobility, transfers, gait, stair negotiation, maintaining seated position, standing prolonged periods, and performing household tasks.  Physical Therapy will continue to follow patient for duration of hospitalization.    Patient continues to benefit from continued skilled PT services: to promote return to prior level of function and safety with continuous assistance and gradual rehabilitative therapy .    PLAN  PT Treatment Plan: Bed mobility;Body mechanics;Coordination;Endurance;Gait training;Strengthening;Transfer training;Balance training  Frequency (Obs): 3-5x/week    SUBJECTIVE  \"I'm so tired\"    OBJECTIVE  Precautions: Bed/chair alarm      PAIN ASSESSMENT   Rating: Unable to rate  Location: generalized body aches  Management Techniques: Activity promotion;Breathing techniques;Body mechanics;Relaxation;Repositioning    BALANCE  Static Sitting: Poor  Dynamic Sitting: Dependent  Static Standing: Poor  Dynamic Standing: Poor -    ACTIVITY TOLERANCE  Pulse:  114 (sustained 120's during activity)  Heart Rate Source: Monitor    AM-PAC '6-Clicks' INPATIENT SHORT FORM - BASIC MOBILITY  How much difficulty does the patient currently have...  Patient Difficulty: Turning over in bed (including adjusting bedclothes, sheets and blankets)?: Unable   Patient Difficulty: Sitting down on and standing up from a chair with arms (e.g., wheelchair, bedside commode, etc.): Unable   Patient Difficulty: Moving from lying on back to sitting on the side of the bed?: Unable   How much help from another person does the patient currently need...   Help from Another: Moving to and from a bed to a chair (including a wheelchair)?: Total   Help from Another: Need to walk in hospital room?: Total   Help from Another: Climbing 3-5 steps with a railing?: Total     AM-PAC Score:  Raw Score: 6   Approx Degree of Impairment: 100%   Standardized Score (AM-PAC Scale): 23.55   CMS Modifier (G-Code): CN    FUNCTIONAL ABILITY STATUS  Functional Mobility/Gait Assessment  Gait Assistance: Moderate assistance  Distance (ft): 5  Assistive Device:  (PT assist from the front)  Pattern: Shuffle (unsteady legs knee buckling with movement chair to follow)  Rolling: maximum assist  Supine to Sit: dependent (maximal assistance x 2)   Sit to Supine: dependent (maximal assistance x 2)   Sit to Stand:  not tested    Skilled Therapy Provided: Patient received supine in bed at initiation of session agreeable to participation in PT,  at bedside. Patient tolerates treatment fairly, requires extensive 1-2 person assistance for all out of bed mobility. Patient requires maximal assistance x 2 to perform supine to sit transfer. Tolerates edge of bed sitting for ~5 minutes, fluctuating between maximal and moderate assistance to remain upright. Patient with elevated HR seated edge of bed, reporting fatigue, returned to supine. Patient left in bed. lines intact, needs in reach and handoff to RN.      The patient's Approx  Degree of Impairment: 100% has been calculated based on documentation in the [facility] '6 clicks' Inpatient Daily Activity Short Form.  Research supports that patients with this level of impairment may benefit from LTAC, however given patient's previous level of function will recommend subacute rehab.  Final disposition will be made by interdisciplinary medical team.      Patient End of Session: In bed;Needs met;Call light within reach;All patient questions and concerns addressed;Family present    CURRENT GOALS   Goals to be met by: 10/15/2024  Patient Goal Patient's self-stated goal is: Return home    Goal #1 Patient is able to demonstrate supine - sit EOB @ level: independent      Goal #1   Current Status  Max A x 2   Goal #2 Patient is able to demonstrate transfers EOB to/from Chair/Wheelchair at assistance level: modified independent with walker - rolling      Goal #2  Current Status  NT   Goal #3 Patient is able to ambulate 50 feet with assist device: walker - rolling at assistance level: modified independent   Goal #3   Current Status  NT   Goal #4 Patient will negotiate 6 stairs/one curb w/ assistive device and supervision   Goal #4   Current Status  NT   Goal #5 Patient to demonstrate independence with home activity/exercise instructions provided to patient in preparation for discharge.   Goal #5   Current Status  progressing     Therapeutic Activity: 10 minutes    Magdalena Serra PT, DPT

## 2024-09-30 NOTE — PROGRESS NOTES
[facility]  part of [facility]    Progress Note    Julissa Ferro Patient Status:  Inpatient    1945 MRN [id number]   Location [facility] 2W/SW Attending Haylie Gonzalez MD   Hosp Day # 3 PCP Jesus Kirkland MD         Subjective:     Constitutional:  Positive for fatigue. Negative for fever.   Cardiovascular:  Negative for chest pain.   Gastrointestinal:  Negative for abdominal distention.   Musculoskeletal:  Negative for back pain.   Neurological:  Negative for seizures.   Psychiatric/Behavioral:  Positive for decreased concentration.      Pt was seen and examined   Earlier on bipap with O2 sat up to 99 %   More awake and alert and claimed improvement   Had BM yesterday / no abd pain   Occasional cough   Objective:   Blood pressure 132/79, pulse (!) 121, temperature 99 °F (37.2 °C), temperature source Temporal, resp. rate 25, height 5' 1\" (1.549 m), weight 97 lb 3.2 oz (44.1 kg), SpO2 93%.  Physical Exam  Constitutional:       General: She is not in acute distress.     Appearance: She is ill-appearing.   HENT:      Head: Atraumatic.      Nose: Nose normal.   Eyes:      General: No scleral icterus.     Pupils: Pupils are equal, round, and reactive to light.   Cardiovascular:      Rate and Rhythm: Regular rhythm. Tachycardia present.   Pulmonary:      Effort: No respiratory distress.      Breath sounds: No stridor. Rales present. No wheezing or rhonchi.      Comments: Good air exchange to both lungs with mild basilar rales with no wheezes or rhonchi   Abdominal:      General: Bowel sounds are normal. There is no distension.      Palpations: Abdomen is soft.      Tenderness: There is no abdominal tenderness. There is no guarding.   Musculoskeletal:      Cervical back: Neck supple.      Right lower leg: No edema.      Left lower leg: No edema.   Skin:     General: Skin is dry.   Neurological:      General: No focal deficit present.      Mental Status: Mental status is at  baseline.         Results:   Lab Results   Component Value Date    WBC 14.6 (H) 09/30/2024    HGB 8.9 (L) 09/30/2024    HCT 29.2 (L) 09/30/2024    .0 09/30/2024    CREATSERUM 1.05 (H) 09/30/2024    BUN 17 09/30/2024     (L) 09/30/2024    K 4.1 09/30/2024     09/30/2024    CO2 19.0 (L) 09/30/2024    GLU 73 09/30/2024    CA 10.7 (H) 09/30/2024    ALB 2.4 (L) 09/30/2024    ALKPHO 84 09/30/2024    BILT 0.9 09/30/2024    TP 5.2 (L) 09/30/2024    AST 36 (H) 09/30/2024    ALT 10 09/30/2024    T4F 1.2 08/13/2024    TSH 4.130 08/13/2024    DDIMER 2.80 (H) 09/28/2024    ESRML 22 06/18/2024    CRP 4.60 (H) 01/31/2024    MG 1.7 09/30/2024    PHOS 3.6 06/24/2022    TROPHS 9 09/28/2024    B12 582 09/27/2024       XR CHEST AP PORTABLE  (CPT=71045)    Result Date: 9/30/2024  CONCLUSION:  1. Borderline cardiomegaly.  Tortuous aorta. 2. Bibasilar parenchymal abnormality and/or atelectatic changes, worse on the left. 3. Small bilateral effusions, greater on left.  Improved aeration right lung base..    Dictated by (CST): Adonis Issa MD on 9/30/2024 at 8:04 AM     Finalized by (CST): Adonis Issa MD on 9/30/2024 at 8:05 AM          CT CHEST PE AORTA (IV ONLY) (CPT=71260)    Result Date: 9/29/2024  CONCLUSION:   No evidence of pulmonary embolism to the bilateral segmental arterial level.  Small bilateral pleural effusions.  Near complete bilateral lower lobe consolidation which could reflect atelectasis with or without superimposed pneumonia.  Mild patchy ground-glass opacity in the superior segment right lower lobe and right upper lobe with mild reticulonodular opacity which may be infectious/inflammatory in etiology.  Small pericardial effusion.  Lesser incidental findings as above.    Dictated by (CST): Feng Carranza MD on 9/29/2024 at 1:10 PM     Finalized by (CST): Feng Carranza MD on 9/29/2024 at 1:16 PM          XR CHEST AP PORTABLE  (CPT=71045)    Result Date: 9/29/2024  CONCLUSION:   Right greater than left bilateral basilar opacities increased on the right.  On the right, this may reflect atelectasis with or without superimposed pneumonia.  Left basilar opacity may reflect a combination of small effusion and parenchymal opacity.       Dictated by (CST): Feng Carranza MD on 9/29/2024 at 11:20 AM     Finalized by (CST): Feng Carranza MD on 9/29/2024 at 11:22 AM          XR CHEST AP PORTABLE  (CPT=71045)    Result Date: 9/28/2024  CONCLUSION:   Left greater than right basilar opacities, which may reflect atelectasis with or without superimposed pneumonia.  Mild elevation of the right hemidiaphragm.    Dictated by (CST): Jay Jay Golden MD on 9/28/2024 at 4:11 PM     Finalized by (CST): Jay Jay Golden MD on 9/28/2024 at 4:13 PM         EKG 12 Lead    Result Date: 9/29/2024  Sinus tachycardia Left axis deviation Low voltage QRS, consider pulmonary disease, pericardial effusion, or normal variant Inferior infarct (cited on or before 26-SEP-2024) Possible Anterolateral infarct (cited on or before 12-DEC-2022) Abnormal ECG When compared with ECG of 26-SEP-2024 20:05, Non-specific change in ST segment in Anterior leads Confirmed by MISSY ATKINS, MITCHELL ([id number]) on 9/29/2024 6:24:04 PM     Assessment & Plan:     1-acute respiratory failure with hypoxia secondary to bilateral pneumonia /atelectasis  Chest CT no PE ; extensive basilar consolidation and atelectasis     Plan ;   BiPAP on and off and O2 therapy   Aspiration precautions  IV Zosyn  Close monitoring in the unit      2-failure to thrive with significant weight loss in the last year  Recently completely bedbound  Exact etiology is not clear with no evidence of malignancy     Nutrition support     3-urinary retention with bilateral hydro secondary to urine retention  [place] in place and urology following     4-hypercalcemia  Secondary to immobility     5-reported history of polymyalgia rheumatica off steroid for a while  Check ESR     6-history of  DVT on Eliquis     7-DVT prophylaxis  Heparin subcutaneous    8- Major depression      9-full code     D/w  at bedside   D/w staff                  Arcelia Julio MD  9/30/2024

## 2024-09-30 NOTE — PROGRESS NOTES
Patient is a 79 year old   female with past medical history of HTN, osteopenia, DVT, and anorexia who was admitted to the hospital for Weakness generalized. The patient has been demonstrating generalized weakness for past two weeks,accompanied by anorexia, malaise, and weight loss of 50 pounds over past two years. Patient was unable to walk by herself using her walker.  Medical workup was negative for any malignancy.    Consult Duration   The patient seen for over 50-minute, follow-up evaluation, over 50% counseling and coordinating care addressing  depression.  Record reviewed, communication with attending, communication with RN and patient seen face to face evaluation.    History of Present Illness:     Communicating the team, the patient was moved to the ICU on 09/29 for acute hypoxemic respiratory failure. Patient was previously on non-rebreather and has transitioned to nasal cannula. Patient evaluated by speech pathologist. Patient remains NPO due to concerns of aspiration pneumonia secondary to dysphagia.         The patient received no psychiatric medications overnight due to NPO status.  Labs and imaging reviewed: Hbg 8.9, Glucose 73, Na 131 Mg 1.7        The patient seen today laying in bed, with  at bedside who remained in room for evaluaiton.      Patient reports her son, whom she has not seen in three years, came to see her today. Patient states that son is planning to bring grandchildren to visit her.      Patient reports improved mood since son's visit. Patient reports she feels she can speak better.     Patient reports feeling more hopeful today. Patient states she feels she is unsure on her ability to overcome her depression.      Patient observed to smiling today and became tearful with valerie when discussing patient's son.      Patient reports continued lack of appetite. Patient has been NPO due to aspiration pre-cautions.      Patient denied any active suicidal ideation  otherwise admitted to some passive suicidal thoughts as to close her eyes and not wake up.     Patient's  in room during interview. He reports that he believes that patients symptoms are due to her depression. He reports that patient seems improved today after son's visit.      The patient is alert and oriented to person, place, date and condition. The patient answers questions and engages in appropriate conversation with no impairment in cognition or distortion in thought process.      The patient reporting feelings some hopelessness, helplessness, worthlessness, low mood, low energy, decreased motivation.     Provided patient with supportive psychotherapy including listening, emotional support, and encouragement.      The patient is not demonstrating any kei or psychosis  The patient denies auditory or visual hallucinations  The patient denies suicidal or homicidal ideation.     Patient started a week ago by her PCP on Effexor 37.5 twice daily.     The patient has been demonstrating feelings of hopelessness, helplessness, worthlessness, low mood, low energy, decreased motivation with increased anxiety, worry, ruminations with impairment in sleep. The patient denies any suicidal ideations. The patient is agreeable to medications changes and to follow up with outpatient psychiatry providers.      Past Psychiatric/Medication History:  1. Prior diagnoses: none  2. Past psychiatric inpatient: none  3. Past outpatient history: [facility] behavioral health therapist Coretta Alba (C[facility]SAR)  4. Past suicide history: none  5. Medication history: Venlafaxine 37.5 mg     Social History:   The patient has been  for 58 years to Andres. They have two twin sons and 3 grandchildren.        Family History:  Alcoholism in father  Medical History:   Past Medical History  Past Medical History:    DVT (deep venous thrombosis) ([facility])    Fracture dislocation of left shoulder joint    w/ nerve injury 2009     Injury of nerve, shoulder or arm    w/ dislocation fx of L shoulder 2009    Nerve injury    nerve damage left hand 2009       Past Surgical History  Past Surgical History:   Procedure Laterality Date    Colonoscopy N/A 8/10/2017    Procedure: COLONOSCOPY;  Surgeon: Sony Galvez MD;  Location: [facility] ENDOSCOPY    Tubal ligation         Family History  Family History   Problem Relation Age of Onset    Alcohol and Other Disorders Associated Father     Diabetes Paternal Aunt     Diabetes Maternal Uncle     No Known Problems Son     No Known Problems Son     Breast Cancer Neg     Ovarian Cancer Neg        Social History  Social History     Socioeconomic History    Marital status:    Tobacco Use    Smoking status: Never    Smokeless tobacco: Never   Vaping Use    Vaping status: Never Used   Substance and Sexual Activity    Alcohol use: Yes     Comment: rarely 3 drinks a year    Drug use: No   Other Topics Concern    Caffeine Concern Yes     Comment: Coffee 2 cups daily     Social Determinants of Health     Food Insecurity: No Food Insecurity (9/27/2024)    Food Insecurity     Food Insecurity: Never true   Transportation Needs: No Transportation Needs (9/27/2024)    Transportation Needs     Lack of Transportation: No   Housing Stability: Low Risk  (9/27/2024)    Housing Stability     Housing Instability: No           Current Medications:  Current Facility-Administered Medications   Medication Dose Route Frequency    heparin (Porcine) 5000 UNIT/ML injection 5,000 Units  5,000 Units Subcutaneous 2 times per day    ipratropium-albuterol (Duoneb) 0.5-2.5 (3) MG/3ML inhalation solution 3 mL  3 mL Nebulization Q6H PRN    piperacillin-tazobactam (Zosyn) 3.375 g in dextrose 5% 100 mL IVPB-ADDV  3.375 g Intravenous Q8H    pantoprazole (Protonix) 40 mg in sodium chloride 0.9% PF 10 mL IV push  40 mg Intravenous Daily    bisacodyl (Dulcolax) 10 MG rectal suppository 10 mg  10 mg Rectal Q12H PRN    dextrose 5% infusion    Intravenous Continuous    escitalopram (Lexapro) tab 5 mg  5 mg Oral Nightly    OLANZapine (ZyPREXA) tab 2.5 mg  2.5 mg Oral Nightly    clonazePAM (KLONOPIN) disintegrating tab 0.25 mg  0.25 mg Oral BID PRN    lactulose (CHRONULAC) 10 GM/15ML solution 30 g  30 g Oral Once    metoprolol (Lopressor) 5 mg/5mL injection 5 mg  5 mg Intravenous Q4H PRN    [Held by provider] apixaban (Eliquis) tab 5 mg  5 mg Oral BID    traMADol (Ultram) tab 50 mg  50 mg Oral Q8H PRN    ondansetron (Zofran) 4 MG/2ML injection 4 mg  4 mg Intravenous Q6H PRN    acetaminophen (Tylenol) tab 650 mg  650 mg Oral Q6H PRN    hydrALAzine (Apresoline) 20 mg/mL injection 10 mg  10 mg Intravenous Q4H PRN    alum-mag hydroxide-simethicone (Maalox) 200-200-20 MG/5ML oral suspension 30 mL  30 mL Oral QID PRN    HYDROmorphone (Dilaudid) 1 MG/ML injection 0.5 mg  0.5 mg Intravenous Q2H PRN    Or    HYDROmorphone (Dilaudid) 1 MG/ML injection 1 mg  1 mg Intravenous Q4H PRN    thiamine (Vitamin B1) tab 100 mg  100 mg Oral Daily     Medications Prior to Admission   Medication Sig    venlafaxine 37.5 MG Oral Tab Take 1 tablet (37.5 mg total) by mouth 2 (two) times daily.    apixaban 5 MG Oral Tab Take 1 tablet (5 mg total) by mouth 2 (two) times daily.    Boswellia Gee (BOSWELLIA OR) Take by mouth daily.    Calcium Carbonate-Vitamin D (OSCAL 500/200 D-3 OR) Take 1 tablet by mouth daily.    B Complex-C (SUPER B COMPLEX OR) Take 1 tablet by mouth daily.    Multiple Vitamins-Minerals (CENTRUM SILVER) Oral Tab Take 1 tablet by mouth daily.    traMADol 50 MG Oral Tab Take 1 tablet (50 mg total) by mouth every 8 (eight) hours as needed for Pain. (Patient not taking: Reported on 9/27/2024)       Allergies  No Known Allergies    Review of Systems:   As by Admitting/Attending    Results:   Laboratory Data:  Lab Results   Component Value Date    WBC 14.6 (H) 09/30/2024    HGB 8.9 (L) 09/30/2024    HCT 29.2 (L) 09/30/2024    .0 09/30/2024    CREATSERUM 1.05 (H)  09/30/2024    BUN 17 09/30/2024     (L) 09/30/2024    K 4.1 09/30/2024     09/30/2024    CO2 19.0 (L) 09/30/2024    GLU 73 09/30/2024    CA 10.7 (H) 09/30/2024    ALB 2.4 (L) 09/30/2024    ALKPHO 84 09/30/2024    TP 5.2 (L) 09/30/2024    AST 36 (H) 09/30/2024    ALT 10 09/30/2024    T4F 1.2 08/13/2024    TSH 4.130 08/13/2024    DDIMER 2.80 (H) 09/28/2024    ESRML 22 06/18/2024    CRP 4.60 (H) 01/31/2024    MG 1.7 09/30/2024    PHOS 3.6 06/24/2022    B12 582 09/27/2024         Imaging:  XR CHEST AP PORTABLE  (CPT=71045)    Result Date: 9/30/2024  CONCLUSION:  1. Borderline cardiomegaly.  Tortuous aorta. 2. Bibasilar parenchymal abnormality and/or atelectatic changes, worse on the left. 3. Small bilateral effusions, greater on left.  Improved aeration right lung base..    Dictated by (CST): Adonis Issa MD on 9/30/2024 at 8:04 AM     Finalized by (CST): Adonis Issa MD on 9/30/2024 at 8:05 AM          CT CHEST PE AORTA (IV ONLY) (CPT=71260)    Result Date: 9/29/2024  CONCLUSION:   No evidence of pulmonary embolism to the bilateral segmental arterial level.  Small bilateral pleural effusions.  Near complete bilateral lower lobe consolidation which could reflect atelectasis with or without superimposed pneumonia.  Mild patchy ground-glass opacity in the superior segment right lower lobe and right upper lobe with mild reticulonodular opacity which may be infectious/inflammatory in etiology.  Small pericardial effusion.  Lesser incidental findings as above.    Dictated by (CST): Feng Carranza MD on 9/29/2024 at 1:10 PM     Finalized by (CST): Feng Carranza MD on 9/29/2024 at 1:16 PM          XR CHEST AP PORTABLE  (CPT=71045)    Result Date: 9/29/2024  CONCLUSION:  Right greater than left bilateral basilar opacities increased on the right.  On the right, this may reflect atelectasis with or without superimposed pneumonia.  Left basilar opacity may reflect a combination of small effusion  and parenchymal opacity.       Dictated by (CST): Feng Carranza MD on 9/29/2024 at 11:20 AM     Finalized by (CST): Feng Carranza MD on 9/29/2024 at 11:22 AM          XR CHEST AP PORTABLE  (CPT=71045)    Result Date: 9/28/2024  CONCLUSION:   Left greater than right basilar opacities, which may reflect atelectasis with or without superimposed pneumonia.  Mild elevation of the right hemidiaphragm.    Dictated by (CST): Jay Jay Golden MD on 9/28/2024 at 4:11 PM     Finalized by (CST): Jay Jay Golden MD on 9/28/2024 at 4:13 PM          CT CHEST+ABDOMEN+PELVIS(CPT=71250/72542)    Result Date: 9/27/2024  CONCLUSION:  1. No evidence of a primary malignancy. 2. Small amount of ascites.  Mild body wall edema. 3. Small pericardial effusion. 4. Marked distention of urinary bladder associated with mild to moderate bilateral hydronephrosis and mild hydroureter.  Findings may be related to reflux. 5. Nonobstructing bilateral renal calculi.  No ureteral calculus. 6. Colonic diverticulosis.  Moderate amount of stool in the colon. 7. Atherosclerotic vascular calcification including coronary artery calcification.    Dictated by (CST): Jomar Andre MD on 9/27/2024 at 8:39 PM     Finalized by (CST): Jomar Andre MD on 9/27/2024 at 8:58 PM           Vital Signs:   Blood pressure 134/74, pulse 110, temperature 98 °F (36.7 °C), temperature source Temporal, resp. rate 21, height 61\", weight 44.1 kg (97 lb 3.2 oz), SpO2 96%.    Mental Status Exam:   Appearance: Stated age female, in hospital gown, laying down in hospital bed. Cachetic.   Psychomotor: psychomotor retardation.  Severely lethargic with soft tone speech.  Orientation: Alert and oriented to person, place, time and condition.  Gait: Not evaluated.  Attitude/Coorperation: Cooperative and attentive.  Otherwise extremely lethargic.  Behavior: Appropriate. Slowed, smiling. Limited eye contact.   Speech: Regular rate and rhythm speech.slowed  Mood: Patient reporting  depressed mood improved today.  Affect: Euphoric congruent with the mood, restricted.  Thought process: Linear and appropriate.  Thought content: Patient denies any suicidal or homicidal ideation.  Perceptions: Patient denies any auditory or visual hallucinations.  Concentration: Grossly intact.  Memory: Grossly intact.  Intellect: Average.  Judgment and Insight: Questionable.     Impression:     Major depressive disorder, single episode severe with melancholic features.      Weakness generalized    Hypercalcemia    Anemia    Failure to thrive in adult      Patient is a 79 year old   female with past medical history of HTN, osteopenia, DVT, and anorexia who was admitted to the hospital for Weakness generalized: The patient has been demonstrating generalized weakness for past two weeks,accompanied by anorexia, malaise, and weight loss of 50 pounds over past two years. Patient was unable to walk by herself using her walker.  Patient indicated for psych consult for evaluation and advise.    09/28/2024: Patient demonstrating chronic depression secondary to significant life stressor. Patient demonstrates restricted emotional capacity with predominant depressed affect. Depression manifesting as cachexia in patient secondary to decreased appetite, loss of motivation, and generalized weakness.     09/30/2024: Patient demonstrating improved mood today after son's visit, indicating that source of depression was isolation from son. Patient noted to be smiling and appears more hopeful today.      Discussed risk and benefit, acknowledging the current symptom and severity.  At this point, I would recommend the following approach:      Focus on safety  Focus on education and support.  Focus on insight orientation helping the patient understand diagnosis and treatment plan.  Continue Lexapro 5 mg nightly as tolerable to patient.  Continue Zyprexa 2.5 mg nightly as tolerable to patient.  Utilize Klonopin 0.25 mg p.o.  3 times daily as needed for anxiety.  Outpatient follow up for chronic depression  Processed with patient at length, the initiation of the above psychotropic medications I advised the patient of the risks, benefits, alternatives and potential side effects. The patient consents to administration of the medications and understands the right to refuse medications at any time. The patient verbalized understanding.   Coordinate plan with team.    Orders This Visit:  Orders Placed This Encounter   Procedures    CBC With Differential With Platelet    Basic Metabolic Panel (8)    Urinalysis with Culture Reflex    Basic Metabolic Panel (8)    CBC With Differential With Platelet    Ferritin    Iron And Tibc    Hepatic Function Panel (7)    UA Microscopic only, urine    PTH, Intact    Vitamin D    Vitamin D, 25-Hydroxy    Potassium    LDH    Copper, Serum    Monoclonal Protein Study    Vitamin B12    Folic Acid Serum (Folate)    Haptoglobin    Parathyroid Hormone-related Peptide (PTH-rP) ([employer])    Vitamin D    Bence Ferro protein, 24 Hour Urine    Protein, 24-Hr Urine    Bence Ferro protein, 24 Hour Urine    SERUM MONOCLONAL PROTEIN STUDY    Vitamin D, 1,25 Dihydroxy    Zinc, Plasma Or Serum    Basic Metabolic Panel (8)    CBC With Differential With Platelet    Magnesium    Magnesium    CEA    Carbohydrate Antigen 19-9    -II    Troponin I (High Sensitivity)    BNP (Brain Natriuretic Peptide)    D-Dimer    Magnesium    Basic Metabolic Panel (8)    CBC With Differential With Platelet    Arterial blood gas    Procalcitonin    Cortisol    CBC With Differential With Platelet    Comp Metabolic Panel (14)    Magnesium    Scan slide    RBC Morphology Scan    Arterial blood gas    Sed Rate, Westergren (Automated)    Basic Metabolic Panel (8)    CBC With Differential With Platelet    Magnesium    Occult Blood Stool, Diagnostic    Urine Culture, Routine       Meds This Visit:  Requested Prescriptions      No  prescriptions requested or ordered in this encounter       Xavier Herbert MD  9/30/2024    Note to Patient: The 21st Century Cures Act makes medical notes like these available to patients in the interest of transparency. However, be advised this is a medical document. It is intended as peer to peer communication. It is written in medical language and may contain abbreviations or verbiage that are unfamiliar. It may appear blunt or direct. Medical documents are intended to carry relevant information, facts as evident, and the clinical opinion of the practitioner. This note may have been transcribed using a voice dictation system. Voice recognition errors may occur. This should not be taken to alter the content or meaning of this note.

## 2024-09-30 NOTE — CM/SW NOTE
CM provided MILO list to spouse at bedside so he could start reviewing potential MILO sites.    For MILO, will need choice.    Carolina MERCEDESA BSN RN CRRN   RN Case Manager  [phone]

## 2024-09-30 NOTE — OCCUPATIONAL THERAPY NOTE
OCCUPATIONAL THERAPY TREATMENT NOTE - INPATIENT    Room Number: 203/203-A     Presenting Problem: Generalized weakness     Problem List  Principal Problem:    Weakness generalized  Active Problems:    Hypercalcemia    Anemia    Failure to thrive in adult    Severe major depression, single episode, without psychotic features ([facility])      OCCUPATIONAL THERAPY ASSESSMENT   Patient demonstrates limited progress this session, goals remain in progress.    Patient is very deconditioned and has poor tolerance for OOB activities; tolerated ~5 minutes at EOB with Max A x2 for bed mobility; Min A for sitting balance; pt declining OOB activities; very supportive spouse present; educated pt and spouse on role of therapy and goals while IP to increase activities; pt returned back to bed and ensured all needs in reach; pt is funcitoning near baseline for self care as she was requiring assist PTA but was more ambulatory at home; continue to recommend GR to maximize function prior to return home; pt stable at exit.     Patient continues to benefit from continued skilled OT services: to promote return to prior level of function and safety with continuous assistance and gradual rehabilitative therapy.     PLAN  OT Treatment Plan: Energy conservation/work simplification techniques;ADL training;Functional transfer training;Patient/Family education;Patient/Family training;Equipment eval/education;Compensatory technique education     SUBJECTIVE  I can try     OBJECTIVE  Precautions: Bed/chair alarm    WEIGHT BEARING RESTRICTION     PAIN ASSESSMENT  Rating: Unable to rate  Location: throughout body  Management Techniques: Activity promotion; Relaxation; Repositioning    ACTIVITIES OF DAILY LIVING ASSESSMENT  AM-PAC ‘6-Clicks’ Inpatient Daily Activity Short Form  How much help from another person does the patient currently need…  -   Putting on and taking off regular lower body clothing?: Total  -   Bathing (including washing, rinsing,  drying)?: Total  -   Toileting, which includes using toilet, bedpan or urinal? : Total  -   Putting on and taking off regular upper body clothing?: Total  -   Taking care of personal grooming such as brushing teeth?: A Little  -   Eating meals?: A Little    AM-PAC Score:  Score: 10  Approx Degree of Impairment: 74.7%  Standardized Score (AM-PAC Scale): 27.31  CMS Modifier (G-Code): CL    EDUCATION PROVIDED  Patient: Role of Occupational Therapy; Plan of Care; Functional Transfer Techniques; Posture/Positioning; Proper Body Mechanics  Patient's Response to Education: Verbalized Understanding; Requires Further Education  Family/Caregiver: Role of Occupational Therapy; Plan of Care  Family/Caregiver's Response to Education: Verbalized Understanding    The patient's Approx Degree of Impairment: 74.7% has been calculated based on documentation in the [facility] '6 clicks' Inpatient Daily Activity Short Form.  Research supports that patients with this level of impairment may benefit from LTC, however, continue to recommend GR to maximize function as pt's goal is to return home with very supportive spouse.  Final disposition will be made by interdisciplinary medical team.    Patient End of Session: In bed    OT Goals:      OT Goals  Patients self stated goal is:      Patient will complete functional transfer with max assist  Comment: ongoing    Patient will complete toileting with max assist   Comment: ongoing    Patient will tolerate sitting EOB for 5 minutes in prep for adls with SBA   Comment:progressing     Comment:          Goals  on: 10/3/24  Frequency: 3x/week    OT Session Time: 12 minutes  Self-Care Home Management: 0 minutes  Therapeutic Activity: 10 minutes    Bahman Motley, Occupational Therapist, OTR/L ext [phone]

## 2024-09-30 NOTE — PROGRESS NOTES
Progress Note     Julissa Ferro Patient Status:  Inpatient    1945 MRN [id number]   Location [facility] 5SW/SE Attending Brady Cole MD   Hosp Day # 3 PCP Jesus Kirkland MD     Chief Complaint:   Chief Complaint   Patient presents with    Fatigue         Subjective:   S: Patient seen and examined, chart reviewed.   Weaned to NC this am.  Feeling better.  No chest pain or worsening sob.  No n/v.    Review of Systems:   10 point ROS unable to be completed as patient on bipap and sleeping.                Objective:   Vital signs:  Temp:  [97.8 °F (36.6 °C)-98.9 °F (37.2 °C)] 98 °F (36.7 °C)  Pulse:  [] 110  Resp:  [12-30] 21  BP: ()/(52-96) 134/74  SpO2:  [84 %-100 %] 96 %    Wt Readings from Last 6 Encounters:   24 97 lb 3.2 oz (44.1 kg)   24 96 lb (43.5 kg)   24 96 lb 12.8 oz (43.9 kg)   24 100 lb 3.2 oz (45.5 kg)   24 102 lb 6.4 oz (46.4 kg)   24 104 lb 12.8 oz (47.5 kg)       Physical Exam:      Gen: now resting comfortably  Pulm: decreased bs  CV: sinus tachy  Abd: Abdomen soft, nontender, nondistended, bowel sounds present  Neuro: No acute focal deficits  MSK: moves extremities  Skin: Warm and dry  Psych: Normal affect  Ext: no cyanosis      Results:   Diagnostic Data:      Labs:    Labs Last 24 Hours:   BMP     CBC    Other     Na 131 Cl 104 BUN 17 Glu 73   Hb 8.9   PTT - Procal 0.27   K 4.1 CO2 19.0 Cr 1.05   WBC 14.6 >< .0  INR - CRP -   Renal Lytes Endo    Hct 29.2   Trop - D dim -   eGFR - Ca 10.7 POC Gluc  113    LFT   pBNP - Lactic -   eGFR AA - PO4 - A1c -   AST 36 APk 84 Prot 5.2  LDL -      Recent Labs   Lab 24  0755 24  1220 24  0516   RBC 3.44* 3.48* 3.26*   HGB 9.4* 9.6* 8.9*   HCT 29.4* 29.1* 29.2*   MCV 85.5 83.6 89.6   MCH 27.3 27.6 27.3   MCHC 32.0 33.0 30.5*   RDW 17.1* 17.0* 17.1*   NEPRELIM 10.10* 16.06* 13.77*   WBC 11.3* 17.3* 14.6*   .0 436.0 347.0       No results found for:  \"PT\", \"INR\"    Recent Labs   Lab 09/26/24  2006 09/27/24  0612 09/28/24  0755 09/29/24  0613 09/30/24  0514   *   < > 108* 109* 73   BUN 23   < > 16 19 17   CREATSERUM 0.81   < > 0.64 0.72 1.05*   CA 12.2*   < > 10.1 9.8 10.7*   ALB 3.1*  --   --   --  2.4*      < > 144 134* 131*   K 3.5   < > 3.6 3.5 4.1      < > 113* 104 104   CO2 30.0   < > 24.0 22.0 19.0*   ALKPHO 90  --   --   --  84   AST 32  --   --   --  36*   ALT 8*  --   --   --  10   BILT 0.6  --   --   --  0.9   TP 5.8  --   --   --  5.2*    < > = values in this interval not displayed.       No results for input(s): \"LEANDRA\", \"LIP\" in the last 168 hours.    Recent Labs   Lab 09/27/24  1235 09/28/24 0755 09/29/24  0613 09/30/24  0514   MG  --  1.7 1.4* 1.7   B12 582  --   --   --        No results for input(s): \"URINE\", \"CULTI\", \"BLDSMR\" in the last 168 hours.      XR CHEST AP PORTABLE  (CPT=71045)    Result Date: 9/30/2024  CONCLUSION:  1. Borderline cardiomegaly.  Tortuous aorta. 2. Bibasilar parenchymal abnormality and/or atelectatic changes, worse on the left. 3. Small bilateral effusions, greater on left.  Improved aeration right lung base..    Dictated by (CST): Adonis Issa MD on 9/30/2024 at 8:04 AM     Finalized by (CST): Adonis Issa MD on 9/30/2024 at 8:05 AM          CT CHEST PE AORTA (IV ONLY) (CPT=71260)    Result Date: 9/29/2024  CONCLUSION:   No evidence of pulmonary embolism to the bilateral segmental arterial level.  Small bilateral pleural effusions.  Near complete bilateral lower lobe consolidation which could reflect atelectasis with or without superimposed pneumonia.  Mild patchy ground-glass opacity in the superior segment right lower lobe and right upper lobe with mild reticulonodular opacity which may be infectious/inflammatory in etiology.  Small pericardial effusion.  Lesser incidental findings as above.    Dictated by (CST): Feng Carranza MD on 9/29/2024 at 1:10 PM     Finalized by (CST):  Feng Carranza MD on 9/29/2024 at 1:16 PM          XR CHEST AP PORTABLE  (CPT=71045)    Result Date: 9/29/2024  CONCLUSION:  Right greater than left bilateral basilar opacities increased on the right.  On the right, this may reflect atelectasis with or without superimposed pneumonia.  Left basilar opacity may reflect a combination of small effusion and parenchymal opacity.       Dictated by (CST): Feng Carranza MD on 9/29/2024 at 11:20 AM     Finalized by (CST): Feng Carranza MD on 9/29/2024 at 11:22 AM          XR CHEST AP PORTABLE  (CPT=71045)    Result Date: 9/28/2024  CONCLUSION:   Left greater than right basilar opacities, which may reflect atelectasis with or without superimposed pneumonia.  Mild elevation of the right hemidiaphragm.    Dictated by (CST): Jay Jay Golden MD on 9/28/2024 at 4:11 PM     Finalized by (CST): Jay Jay Golden MD on 9/28/2024 at 4:13 PM               Pro-Calcitonin  Recent Labs   Lab 09/29/24  1219   PCT 0.27*       Cardiac  No results for input(s): \"TROP\", \"PBNP\" in the last 168 hours.    Imaging: Imaging data reviewed in [facility].    XR CHEST AP PORTABLE  (CPT=71045)    Result Date: 9/30/2024  CONCLUSION:  1. Borderline cardiomegaly.  Tortuous aorta. 2. Bibasilar parenchymal abnormality and/or atelectatic changes, worse on the left. 3. Small bilateral effusions, greater on left.  Improved aeration right lung base..    Dictated by (CST): Adonis Issa MD on 9/30/2024 at 8:04 AM     Finalized by (CST): Adonis Issa MD on 9/30/2024 at 8:05 AM          CT CHEST PE AORTA (IV ONLY) (CPT=71260)    Result Date: 9/29/2024  CONCLUSION:   No evidence of pulmonary embolism to the bilateral segmental arterial level.  Small bilateral pleural effusions.  Near complete bilateral lower lobe consolidation which could reflect atelectasis with or without superimposed pneumonia.  Mild patchy ground-glass opacity in the superior segment right lower lobe and right upper lobe with mild  reticulonodular opacity which may be infectious/inflammatory in etiology.  Small pericardial effusion.  Lesser incidental findings as above.    Dictated by (CST): Feng Carranza MD on 9/29/2024 at 1:10 PM     Finalized by (CST): Feng Carranza MD on 9/29/2024 at 1:16 PM          XR CHEST AP PORTABLE  (CPT=71045)    Result Date: 9/29/2024  CONCLUSION:  Right greater than left bilateral basilar opacities increased on the right.  On the right, this may reflect atelectasis with or without superimposed pneumonia.  Left basilar opacity may reflect a combination of small effusion and parenchymal opacity.       Dictated by (CST): Feng Carranza MD on 9/29/2024 at 11:20 AM     Finalized by (CST): Feng Carranza MD on 9/29/2024 at 11:22 AM          XR CHEST AP PORTABLE  (CPT=71045)    Result Date: 9/28/2024  CONCLUSION:   Left greater than right basilar opacities, which may reflect atelectasis with or without superimposed pneumonia.  Mild elevation of the right hemidiaphragm.    Dictated by (CST): Jay Jay Golden MD on 9/28/2024 at 4:11 PM     Finalized by (CST): Jay Jay Golden MD on 9/28/2024 at 4:13 PM              Medications:    magnesium sulfate  2 g Intravenous Once    heparin  5,000 Units Subcutaneous 2 times per day    piperacillin-tazobactam  3.375 g Intravenous Q8H    pantoprazole  40 mg Intravenous Daily    escitalopram  5 mg Oral Nightly    OLANZapine  2.5 mg Oral Nightly    lactulose  30 g Oral Once    [Held by provider] apixaban  5 mg Oral BID    thiamine  100 mg Oral Daily       Assessment & Plan:   ASSESSMENT / PLAN:     79-year-old female presents the emergency room with generalized weakness inability to ambulate and profound weight loss over the past 1 year of greater than 40 pounds.  Her labs are significant for hypercalcemia leukopenia and anemia.     Acute hypoxemic respiratory failure  Possible Aspiration pneumonia  Transferred to PCU  Pulm/Crit Care consulted  Started IV zosyn  Cultures  pending  Weaned off bipap and on to NC  Failed speech eval - plan to repeat    Failure to thrive  Severe Depression (situational) and asthenia - has not seen or talked to her twin boys in nearly 3 years, they have been shut off by them for specific reasons.  One, three years ago, for insulting comments and the other for unknown reasons and he has a hx of mental health problems (SI).   Psych consulted, lito recs    Urinary retention  Bilateral non-obstructing calculus.   Seen by urology  Plan to cont anaya for now  Repeat renal us in 2 days to assess for improvement    Normocytic anemia  Iron deficiency  Heme/onc following  Transfuse for hemoglobin less than 6  Check fecal occult blood test  Start ferric gluconate 125 mg daily for 3 days    Hypercalcemia  Dehydration  Severe protein caloric malnutrition, BMI 17  IV fluids initiated - will decrease now   Will hold calcium supplements for now,  PTH levels are low.  Check PTH related protein levels   Check vitamin D levels   PT/OT for deconditioning        dvt prophylaxis: eliquis  code status: full code  dispo: home upon medical clearance     MDM: high     Estimated date of discharge: To be determined  Discharge is dependent on: Diagnosis being made in clinical improvement  At this point Ms. Ferro is expected to be discharge to: Home versus rehab     Plan of care discussed with  and patient, also psych and nurse

## 2024-09-30 NOTE — PLAN OF CARE
Problem: SAFETY ADULT - FALL  Goal: Free from fall injury  Description: INTERVENTIONS:  - Assess pt frequently for physical needs  - Identify cognitive and physical deficits and behaviors that affect risk of falls.  - [place] fall precautions as indicated by assessment.  - Educate pt/family on patient safety including physical limitations  - Instruct pt to call for assistance with activity based on assessment  - Modify environment to reduce risk of injury  - Provide assistive devices as appropriate  - Consider OT/PT consult to assist with strengthening/mobility  - Encourage toileting schedule  Outcome: Progressing     Problem: GENITOURINARY - ADULT  Goal: Absence of urinary retention  Description: INTERVENTIONS:  - Assess patient’s ability to void and empty bladder  - Monitor intake/output and perform bladder scan as needed  - Follow urinary retention protocol/standard of care  - Consider collaborating with pharmacy to review patient's medication profile  - Implement strategies to promote bladder emptying  Outcome: Progressing     Problem: SKIN/TISSUE INTEGRITY - ADULT  Goal: Skin integrity remains intact  Description: INTERVENTIONS  - Assess and document risk factors for pressure ulcer development  - Assess and document skin integrity  - Monitor for areas of redness and/or skin breakdown  - Initiate interventions, skin care algorithm/standards of care as needed  Outcome: Progressing     Problem: Impaired Functional Mobility  Goal: Achieve highest/safest level of mobility/gait  Description: Interventions:  - Assess patient's functional ability and stability  - Promote increasing activity/tolerance for mobility and gait    Outcome: Not Progressing

## 2024-10-01 PROBLEM — Z51.5 PALLIATIVE CARE BY SPECIALIST: Status: ACTIVE | Noted: 2024-01-01

## 2024-10-01 PROBLEM — Z71.89 GOALS OF CARE, COUNSELING/DISCUSSION: Status: ACTIVE | Noted: 2024-01-01

## 2024-10-01 PROBLEM — R63.0 POOR APPETITE: Status: ACTIVE | Noted: 2024-01-01

## 2024-10-01 PROBLEM — R13.10 DYSPHAGIA: Status: ACTIVE | Noted: 2024-01-01

## 2024-10-01 NOTE — PLAN OF CARE
Problem: SAFETY ADULT - FALL  Goal: Free from fall injury  Description: INTERVENTIONS:  - Assess pt frequently for physical needs  - Identify cognitive and physical deficits and behaviors that affect risk of falls.  - [place] fall precautions as indicated by assessment.  - Educate pt/family on patient safety including physical limitations  - Instruct pt to call for assistance with activity based on assessment  - Modify environment to reduce risk of injury  - Provide assistive devices as appropriate  - Consider OT/PT consult to assist with strengthening/mobility  - Encourage toileting schedule  Outcome: Progressing     Problem: GENITOURINARY - ADULT  Goal: Absence of urinary retention  Description: INTERVENTIONS:  - Assess patient’s ability to void and empty bladder  - Monitor intake/output and perform bladder scan as needed  - Follow urinary retention protocol/standard of care  - Consider collaborating with pharmacy to review patient's medication profile  - Implement strategies to promote bladder emptying  Outcome: Not Progressing     Problem: Impaired Functional Mobility  Goal: Achieve highest/safest level of mobility/gait  Description: Interventions:  - Assess patient's functional ability and stability  - Promote increasing activity/tolerance for mobility and gait    Outcome: Not Progressing

## 2024-10-01 NOTE — PROGRESS NOTES
Patient is a 79 year old   female with past medical history of HTN, osteopenia, DVT, and anorexia who was admitted to the hospital for Weakness generalized. The patient has been demonstrating generalized weakness for past two weeks,accompanied by anorexia, malaise, and weight loss of 50 pounds over past two years. Patient was unable to walk by herself using her walker.  Medical workup was negative for any malignancy.    Consult Duration   The patient seen for over 50-minute, follow-up evaluation, over 50% counseling and coordinating care addressing  depression.  Record reviewed, communication with attending, communication with RN and patient seen face to face evaluation.    History of Present Illness:     Communicating the team, no acute changes over night. Patient remains NPO.      The patient received no psychiatric medications overnight due to NPO status.  Labs and imaging reviewed: Mg 2.7 Glucose 104 Hgb 10.1        The patient seen today laying in bed, with  at bedside who remained in room for evaluaiton.   The patient demonstrating increased lethargy and softer speech.  Patient reports improved mood since son's visit, but still feels she is depressed.      Patient reports she feels hungry and thirsty but has difficulty swallowing. Patient has been NPO due to aspiration pre-cautions.     Patient reports poor sleep with frequent night waking.     Patient observed to smiling today and became tearful with valerie when discussing patient's son.      Patient able to recall month but failed to recall year. Patient recognized  Andres to be present and recalled they have been  for 58 years.     Patient denied any active suicidal ideation otherwise admitted to some passive suicidal thoughts as to close her eyes and not wake up.     Patient's  in room during interview. He reports that he believes that patients symptoms are due to her depression. He reports that patient seems  improved today after son's visit.     Patient to have audible wheezing during evaluation, limiting patient's ability to speak. Patient unable to hold conversation.     The patient somewhat able to respond to questions however was easily distracted and had difficulty constructing speech.       The patient reporting feelings some hopelessness, helplessness, worthlessness, low mood, low energy, decreased motivation.     Provided patient with supportive psychotherapy including listening, emotional support, and encouragement.      The patient is not demonstrating any kei or psychosis  The patient denies auditory or visual hallucinations  The patient denies suicidal or homicidal ideation.     Patient started a week ago by her PCP on Effexor 37.5 twice daily.     The patient has been demonstrating feelings of hopelessness, helplessness, worthlessness, low mood, low energy, decreased motivation with increased anxiety, worry, ruminations with impairment in sleep. The patient denies any suicidal ideations. The patient is agreeable to medications changes and to follow up with outpatient psychiatry providers.      Past Psychiatric/Medication History:  1. Prior diagnoses: none  2. Past psychiatric inpatient: none  3. Past outpatient history: [facility] behavioral health therapist Coretta Alba ([facility]ON[facility])  4. Past suicide history: none  5. Medication history: Venlafaxine 37.5 mg     Social History:   The patient has been  for 58 years to Andres. They have two twin sons and 3 grandchildren.        Family History:  Alcoholism in father  Medical History:   Past Medical History  Past Medical History:    DVT (deep venous thrombosis) ([facility])    Fracture dislocation of left shoulder joint    w/ nerve injury 2009    Injury of nerve, shoulder or arm    w/ dislocation fx of L shoulder 2009    Nerve injury    nerve damage left hand 2009       Past Surgical History  Past Surgical History:   Procedure Laterality Date     Colonoscopy N/A 8/10/2017    Procedure: COLONOSCOPY;  Surgeon: Sony Galvez MD;  Location: [facility] ENDOSCOPY    Tubal ligation         Family History  Family History   Problem Relation Age of Onset    Alcohol and Other Disorders Associated Father     Diabetes Paternal Aunt     Diabetes Maternal Uncle     No Known Problems Son     No Known Problems Son     Breast Cancer Neg     Ovarian Cancer Neg        Social History  Social History     Socioeconomic History    Marital status:    Tobacco Use    Smoking status: Never    Smokeless tobacco: Never   Vaping Use    Vaping status: Never Used   Substance and Sexual Activity    Alcohol use: Yes     Comment: rarely 3 drinks a year    Drug use: No   Other Topics Concern    Caffeine Concern Yes     Comment: Coffee 2 cups daily     Social Determinants of Health     Food Insecurity: No Food Insecurity (9/27/2024)    Food Insecurity     Food Insecurity: Never true   Transportation Needs: No Transportation Needs (9/27/2024)    Transportation Needs     Lack of Transportation: No   Housing Stability: Low Risk  (9/27/2024)    Housing Stability     Housing Instability: No           Current Medications:  Current Facility-Administered Medications   Medication Dose Route Frequency    metoprolol tartrate (Lopressor) tab 25 mg  25 mg Oral 2x Daily(Beta Blocker)    heparin (Porcine) 5000 UNIT/ML injection 5,000 Units  5,000 Units Subcutaneous 2 times per day    ipratropium-albuterol (Duoneb) 0.5-2.5 (3) MG/3ML inhalation solution 3 mL  3 mL Nebulization Q6H PRN    piperacillin-tazobactam (Zosyn) 3.375 g in dextrose 5% 100 mL IVPB-ADDV  3.375 g Intravenous Q8H    pantoprazole (Protonix) 40 mg in sodium chloride 0.9% PF 10 mL IV push  40 mg Intravenous Daily    bisacodyl (Dulcolax) 10 MG rectal suppository 10 mg  10 mg Rectal Q12H PRN    dextrose 5% infusion   Intravenous Continuous    escitalopram (Lexapro) tab 5 mg  5 mg Oral Nightly    OLANZapine (ZyPREXA) tab 2.5 mg  2.5 mg Oral  Nightly    clonazePAM (KLONOPIN) disintegrating tab 0.25 mg  0.25 mg Oral BID PRN    lactulose (CHRONULAC) 10 GM/15ML solution 30 g  30 g Oral Once    metoprolol (Lopressor) 5 mg/5mL injection 5 mg  5 mg Intravenous Q4H PRN    [Held by provider] apixaban (Eliquis) tab 5 mg  5 mg Oral BID    traMADol (Ultram) tab 50 mg  50 mg Oral Q8H PRN    ondansetron (Zofran) 4 MG/2ML injection 4 mg  4 mg Intravenous Q6H PRN    acetaminophen (Tylenol) tab 650 mg  650 mg Oral Q6H PRN    hydrALAzine (Apresoline) 20 mg/mL injection 10 mg  10 mg Intravenous Q4H PRN    alum-mag hydroxide-simethicone (Maalox) 200-200-20 MG/5ML oral suspension 30 mL  30 mL Oral QID PRN    HYDROmorphone (Dilaudid) 1 MG/ML injection 0.5 mg  0.5 mg Intravenous Q2H PRN    Or    HYDROmorphone (Dilaudid) 1 MG/ML injection 1 mg  1 mg Intravenous Q4H PRN    thiamine (Vitamin B1) tab 100 mg  100 mg Oral Daily     Medications Prior to Admission   Medication Sig    venlafaxine 37.5 MG Oral Tab Take 1 tablet (37.5 mg total) by mouth 2 (two) times daily.    apixaban 5 MG Oral Tab Take 1 tablet (5 mg total) by mouth 2 (two) times daily.    Boswellia Gee (BOSWELLIA OR) Take by mouth daily.    Calcium Carbonate-Vitamin D (OSCAL 500/200 D-3 OR) Take 1 tablet by mouth daily.    B Complex-C (SUPER B COMPLEX OR) Take 1 tablet by mouth daily.    Multiple Vitamins-Minerals (CENTRUM SILVER) Oral Tab Take 1 tablet by mouth daily.    traMADol 50 MG Oral Tab Take 1 tablet (50 mg total) by mouth every 8 (eight) hours as needed for Pain. (Patient not taking: Reported on 9/27/2024)       Allergies  No Known Allergies    Review of Systems:   As by Admitting/Attending    Results:   Laboratory Data:  Lab Results   Component Value Date    WBC 6.8 10/01/2024    HGB 10.1 (L) 10/01/2024    HCT 30.6 (L) 10/01/2024    .0 10/01/2024    CREATSERUM 1.30 (H) 10/01/2024    BUN 36 (H) 10/01/2024     10/01/2024    K 3.5 10/01/2024     10/01/2024    CO2 23.0 10/01/2024    GLU  104 (H) 10/01/2024    CA 10.4 10/01/2024    ALB 2.4 (L) 09/30/2024    ALKPHO 84 09/30/2024    TP 5.2 (L) 09/30/2024    AST 36 (H) 09/30/2024    ALT 10 09/30/2024    T4F 1.2 08/13/2024    TSH 4.130 08/13/2024    DDIMER 2.80 (H) 09/28/2024    ESRML 41 (H) 10/01/2024    CRP 4.60 (H) 01/31/2024    MG 2.7 (H) 10/01/2024    MG 2.6 10/01/2024    PHOS 5.1 10/01/2024    B12 582 09/27/2024         Imaging:  US VENOUS DOPPLER LEG BILAT - DIAG IMG (CPT=93970)    Result Date: 9/30/2024  CONCLUSION: Normal examination.     Dictated by (CST): Jordan Dawn MD on 9/30/2024 at 2:30 PM     Finalized by (CST): Jordan Dawn MD on 9/30/2024 at 2:31 PM          XR CHEST AP PORTABLE  (CPT=71045)    Result Date: 9/30/2024  CONCLUSION:  1. Borderline cardiomegaly.  Tortuous aorta. 2. Bibasilar parenchymal abnormality and/or atelectatic changes, worse on the left. 3. Small bilateral effusions, greater on left.  Improved aeration right lung base..    Dictated by (CST): Adonis Issa MD on 9/30/2024 at 8:04 AM     Finalized by (CST): Adonis Issa MD on 9/30/2024 at 8:05 AM          CT CHEST PE AORTA (IV ONLY) (CPT=71260)    Result Date: 9/29/2024  CONCLUSION:   No evidence of pulmonary embolism to the bilateral segmental arterial level.  Small bilateral pleural effusions.  Near complete bilateral lower lobe consolidation which could reflect atelectasis with or without superimposed pneumonia.  Mild patchy ground-glass opacity in the superior segment right lower lobe and right upper lobe with mild reticulonodular opacity which may be infectious/inflammatory in etiology.  Small pericardial effusion.  Lesser incidental findings as above.    Dictated by (CST): Feng Carranza MD on 9/29/2024 at 1:10 PM     Finalized by (CST): Feng Carranza MD on 9/29/2024 at 1:16 PM          XR CHEST AP PORTABLE  (CPT=71045)    Result Date: 9/29/2024  CONCLUSION:  Right greater than left bilateral basilar opacities increased on the right.  On  the right, this may reflect atelectasis with or without superimposed pneumonia.  Left basilar opacity may reflect a combination of small effusion and parenchymal opacity.       Dictated by (CST): Feng Carranza MD on 9/29/2024 at 11:20 AM     Finalized by (CST): Feng Carranza MD on 9/29/2024 at 11:22 AM          XR CHEST AP PORTABLE  (CPT=71045)    Result Date: 9/28/2024  CONCLUSION:   Left greater than right basilar opacities, which may reflect atelectasis with or without superimposed pneumonia.  Mild elevation of the right hemidiaphragm.    Dictated by (CST): Jay Jay Golden MD on 9/28/2024 at 4:11 PM     Finalized by (CST): Jay Jay Golden MD on 9/28/2024 at 4:13 PM           Vital Signs:   Blood pressure 158/81, pulse 101, temperature 97.3 °F (36.3 °C), temperature source Temporal, resp. rate 20, height 61\", weight 42.2 kg (93 lb 0.6 oz), SpO2 93%.    Mental Status Exam:   Appearance: Stated age female, in hospital gown, laying down in hospital bed. Cachetic.   Psychomotor: psychomotor retardation.  Severely lethargic with soft tone speech.  Orientation: Alert and oriented to person, place, time and condition.  Gait: Not evaluated.  Attitude/Coorperation: Cooperative and attentive.  Otherwise extremely lethargic.  Behavior: Appropriate. Slowed, smiling. Limited eye contact.   Speech: slowed, soft, incomprehensible mumbling,   Mood: Patient reporting depressed mood improved today.  Affect: Euphoric congruent with the mood, restricted.  Thought process: slowed, distractible.   Thought content: Patient denies any suicidal or homicidal ideation.  Perceptions: Patient denies any auditory or visual hallucinations.  Concentration: Grossly intact.  Memory: Grossly intact.  Intellect: Average.  Judgment and Insight: Questionable.     Impression:     Major depressive disorder, single episode severe with melancholic features.      Weakness generalized    Hypercalcemia    Anemia    Failure to thrive in  adult      Patient is a 79 year old   female with past medical history of HTN, osteopenia, DVT, and anorexia who was admitted to the hospital for Weakness generalized: The patient has been demonstrating generalized weakness for past two weeks,accompanied by anorexia, malaise, and weight loss of 50 pounds over past two years. Patient was unable to walk by herself using her walker.  Patient indicated for psych consult for evaluation and advise.    09/28/2024: Patient demonstrating chronic depression secondary to significant life stressor. Patient demonstrates restricted emotional capacity with predominant depressed affect. Depression manifesting as cachexia in patient secondary to decreased appetite, loss of motivation, and generalized weakness.     09/30/2024: Patient demonstrating improved mood today after son's visit, indicating that source of depression was isolation from son. Patient noted to be smiling and appears more hopeful today.     10/01/2024: Patient demonstrating improved mood however becoming increasing lethargic secondary to medical condition. Patient easily distractible and demonstrating slowed thought process. Patient unable hold conversation.        Discussed risk and benefit, acknowledging the current symptom and severity.  At this point, I would recommend the following approach:      Focus on safety  Focus on education and support.  Focus on insight orientation helping the patient understand diagnosis and treatment plan.  Continue Lexapro 5 mg nightly as tolerable to patient.  Discontinue Zyprexa for now due to lethargy.  Utilize Klonopin 0.25 mg twice daily as needed for anxiety.  Outpatient follow up for chronic depression  Processed with patient at length, the initiation of the above psychotropic medications I advised the patient of the risks, benefits, alternatives and potential side effects. The patient consents to administration of the medications and understands the right to  refuse medications at any time. The patient verbalized understanding.   Coordinate plan with team.    Orders This Visit:  Orders Placed This Encounter   Procedures    CBC With Differential With Platelet    Basic Metabolic Panel (8)    Urinalysis with Culture Reflex    Basic Metabolic Panel (8)    CBC With Differential With Platelet    Ferritin    Iron And Tibc    Hepatic Function Panel (7)    UA Microscopic only, urine    PTH, Intact    Vitamin D    Vitamin D, 25-Hydroxy    Potassium    LDH    Copper, Serum    Monoclonal Protein Study    Vitamin B12    Folic Acid Serum (Folate)    Haptoglobin    Parathyroid Hormone-related Peptide (PTH-rP) ([employer])    Vitamin D    Bence Ferro protein, 24 Hour Urine    Protein, 24-Hr Urine    Bence Ferro protein, 24 Hour Urine    SERUM MONOCLONAL PROTEIN STUDY    Vitamin D, 1,25 Dihydroxy    Zinc, Plasma Or Serum    Basic Metabolic Panel (8)    CBC With Differential With Platelet    Magnesium    Magnesium    CEA    Carbohydrate Antigen 19-9    -II    Troponin I (High Sensitivity)    BNP (Brain Natriuretic Peptide)    D-Dimer    Magnesium    Basic Metabolic Panel (8)    CBC With Differential With Platelet    Arterial blood gas    Procalcitonin    Cortisol    CBC With Differential With Platelet    Comp Metabolic Panel (14)    Magnesium    Scan slide    RBC Morphology Scan    Arterial blood gas    Sed Rate, Westergren (Automated)    Basic Metabolic Panel (8)    CBC With Differential With Platelet    Magnesium    Scan slide    Phosphorus    Basic Metabolic Panel (8)    CBC With Differential With Platelet    Occult Blood Stool, Diagnostic    Urine Culture, Routine       Meds This Visit:  Requested Prescriptions      No prescriptions requested or ordered in this encounter       Xavier Herbert MD  10/01/2024    Note to Patient: The 21st Century Cures Act makes medical notes like these available to patients in the interest of transparency. However, be advised this is a  medical document. It is intended as peer to peer communication. It is written in medical language and may contain abbreviations or verbiage that are unfamiliar. It may appear blunt or direct. Medical documents are intended to carry relevant information, facts as evident, and the clinical opinion of the practitioner. This note may have been transcribed using a voice dictation system. Voice recognition errors may occur. This should not be taken to alter the content or meaning of this note.

## 2024-10-01 NOTE — PROGRESS NOTES
[facility]  part of [facility]    Progress Note    Julissa Ferro Patient Status:  Inpatient    1945 MRN [id number]   Location [facility] 2W/SW Attending Haylie Gonzalez MD   Hosp Day # 4 PCP Jesus Kirkland MD       Subjective:     Unable to perform ROS    Off bipap on 5 L NC   Confused   Failed swallow with speech and now npo   Denied abd pain   Occasional cough   Otherwise poor historian    at bedside   Objective:   Blood pressure 151/86, pulse 102, temperature 97 °F (36.1 °C), temperature source Temporal, resp. rate 18, height 5' 1\" (1.549 m), weight 93 lb 0.6 oz (42.2 kg), SpO2 100%.  Physical Exam  Vitals and nursing note reviewed.   Constitutional:       Appearance: She is ill-appearing.   HENT:      Head: Normocephalic and atraumatic.      Nose: Nose normal.      Mouth/Throat:      Mouth: Mucous membranes are moist.   Eyes:      General: No scleral icterus.  Cardiovascular:      Rate and Rhythm: Regular rhythm. Tachycardia present.      Heart sounds:      No gallop.   Pulmonary:      Effort: No respiratory distress.      Breath sounds: No stridor. Rales present. No wheezing or rhonchi.   Abdominal:      General: Abdomen is flat. Bowel sounds are normal. There is no distension.      Palpations: Abdomen is soft.      Tenderness: There is no guarding or rebound.   Musculoskeletal:      Cervical back: Neck supple.      Right lower leg: No edema.      Left lower leg: No edema.   Lymphadenopathy:      Cervical: No cervical adenopathy.   Skin:     General: Skin is dry.   Neurological:      Comments: Awake , oriented x 1  Follows commands          Results:   Lab Results   Component Value Date    WBC 6.8 10/01/2024    HGB 10.1 (L) 10/01/2024    HCT 30.6 (L) 10/01/2024    .0 10/01/2024    CREATSERUM 1.30 (H) 10/01/2024    BUN 36 (H) 10/01/2024     10/01/2024    K 3.5 10/01/2024     10/01/2024    CO2 23.0 10/01/2024     (H) 10/01/2024     CA 10.4 10/01/2024    ALB 2.4 (L) 09/30/2024    ALKPHO 84 09/30/2024    BILT 0.9 09/30/2024    TP 5.2 (L) 09/30/2024    AST 36 (H) 09/30/2024    ALT 10 09/30/2024    T4F 1.2 08/13/2024    TSH 4.130 08/13/2024    DDIMER 2.80 (H) 09/28/2024    ESRML 41 (H) 10/01/2024    CRP 4.60 (H) 01/31/2024    MG 2.7 (H) 10/01/2024    MG 2.6 10/01/2024    PHOS 5.1 10/01/2024    TROPHS 9 09/28/2024    B12 582 09/27/2024       US VENOUS DOPPLER LEG BILAT - DIAG IMG (CPT=93970)    Result Date: 9/30/2024  CONCLUSION: Normal examination.     Dictated by (CST): Jordan Dawn MD on 9/30/2024 at 2:30 PM     Finalized by (CST): Jordan Dawn MD on 9/30/2024 at 2:31 PM          XR CHEST AP PORTABLE  (CPT=71045)    Result Date: 9/30/2024  CONCLUSION:  1. Borderline cardiomegaly.  Tortuous aorta. 2. Bibasilar parenchymal abnormality and/or atelectatic changes, worse on the left. 3. Small bilateral effusions, greater on left.  Improved aeration right lung base..    Dictated by (CST): Adonis Issa MD on 9/30/2024 at 8:04 AM     Finalized by (CST): Adonis Issa MD on 9/30/2024 at 8:05 AM          CT CHEST PE AORTA (IV ONLY) (CPT=71260)    Result Date: 9/29/2024  CONCLUSION:   No evidence of pulmonary embolism to the bilateral segmental arterial level.  Small bilateral pleural effusions.  Near complete bilateral lower lobe consolidation which could reflect atelectasis with or without superimposed pneumonia.  Mild patchy ground-glass opacity in the superior segment right lower lobe and right upper lobe with mild reticulonodular opacity which may be infectious/inflammatory in etiology.  Small pericardial effusion.  Lesser incidental findings as above.    Dictated by (CST): Feng Carranza MD on 9/29/2024 at 1:10 PM     Finalized by (CST): Feng Carranza MD on 9/29/2024 at 1:16 PM          XR CHEST AP PORTABLE  (CPT=71045)    Result Date: 9/29/2024  CONCLUSION:  Right greater than left bilateral basilar opacities increased on the  right.  On the right, this may reflect atelectasis with or without superimposed pneumonia.  Left basilar opacity may reflect a combination of small effusion and parenchymal opacity.       Dictated by (CST): Feng Carranza MD on 9/29/2024 at 11:20 AM     Finalized by (CST): Feng Carranza MD on 9/29/2024 at 11:22 AM               Assessment & Plan:      1-acute respiratory failure with hypoxia secondary to aspiration pneumonia and atelectasis  Chest CT no PE ; extensive basilar consolidation and atelectasis     Plan ;  On and off BiPAP on 5 L NC O2  Aspiration precautions  IV Zosyn  Close monitoring in the unit      2-failure to thrive with significant weight loss in the last year  Recently completely bedbound  Exact etiology is not clear with no evidence of malignancy  Likely severe depression      Nutrition support    3- dysphagia   Failed swallow / speech following if fails today will place keofeed and  agreeable      4-urinary retention with bilateral hydro secondary to urine retention  [place] in place and urology following     5- mild hypercalcemia  Secondary to immobility     5-reported history of polymyalgia rheumatica off steroid for a while        7-DVT prophylaxis  Heparin subcutaneous     8- Major depression      9-full code       D/w  today at length   Agreeable for Keofeed if fails swallow   He is ok with palliative consult   35 min cct with pt          Arcelia Julio MD  10/1/2024

## 2024-10-01 NOTE — SPIRITUAL CARE NOTE
Spiritual Care Visit Note    Patient Name: Julissa Ferro Date of Spiritual Care Visit: 24   : 1945 Primary Dx: Weakness generalized       Referred By: Referral From: Nurse    Spiritual Care Taxonomy:    Intended Effects: Build relationship of care and support    Methods: Encourage sharing of feelings;Offer spiritual/Advent support;Exploring hope    Interventions: Share words of hope and inspiration;Silent prayer;Acknowledge current situation;Active listening;Ask guided questions;Provide hospitality    Visit Type/Summary:     - Spiritual Care: Responded to a request for spiritual care and assessed the patient for spiritual care needs. Family not present at the time of   remains available as needed for follow up.    Spiritual Care support can be requested via an Epic consult. For urgent/immediate needs, please contact the On Call  at: [facility]: ext [phone]    Rev Mimi Hawthorne MDiv

## 2024-10-01 NOTE — CONGREGATE LIVING REVIEW
[facility] Authorization    The [facility] Review Committee has reviewed this case and the patient IS APPROVED for discharge to a facility for Short Term Skilled once the following procedure is followed:     - The physician discharge instructions (contained within the [facility] note for SNF) must inlcude the below appropriate and approved COVID instructions to the facility    For questions regarding CLRC approval process, please contact the CM assigned to the case.  For questions regarding RN discharge workflow, please contact the unit Clinical Leader.

## 2024-10-01 NOTE — CONSULTS
[facility]  part of [facility]  Palliative Care Initial Consult Note    Julissa Ferro Patient Status:  Inpatient    1945 MRN [id number]   Location [facility] 2W/SW Attending Haylie Gonzalez MD   Hosp Day # 4 PCP Jesus Kirkland MD     Date of Consult: 10/1/2024  Patient seen at: [facility] Inpatient    Reason for Consultation: Consult ordered by:: Ivory DELANEY for evaluation of Palliative Care needs and Goals of care discussion.    Subjective     History of Present Illness: Julissa Ferro is a 79 year old female with polymyalgia rheumatica, uterine prolapse, hx DVT on Eliquis, depression, constipation who was admitted on 2024 for Generalized weakness. Work up in our hospital revealed hydronephrosis, hypercalcemia; workup did not demonstrate a concern for cancer.  History was obtained from [facility] and patient and spouse. In brief, Psych consult on  with medication adjustments.   RRT  for tachycardia, elevated BP, and hypoxia resulting in transfer to CCU. Failed swallow eval, possible aspiration PNA. Per PT/OT notes from , significant disability >70%. Per notes Leidy are estranged from their twin sons.     Today is day #4 of hospitalization.     When I entered the room, the patient was awake, alert, and lying in bed. Spouse present at bedside.   Pt able to participate despite fatigue, soft voice, and excessive oral secretions.   Denies pain at rest.     See summary of discussion below.      Review of Systems:   Symptoms(s): Fatigue;Drowsiness;Dyspnea;Depression;Anxiety;Anorexia;Weight loss (excessive secretions, dysphagia)  Bowel Movement         2024  0500             Stool Count Calculated for I/O: 1          Wt Readings from Last 6 Encounters:   10/01/24 93 lb 0.6 oz (42.2 kg)   24 96 lb (43.5 kg)   24 96 lb 12.8 oz (43.9 kg)   24 100 lb 3.2 oz (45.5 kg)   24 102 lb 6.4 oz (46.4 kg)   24 104 lb 12.8  oz (47.5 kg)        Palliative Care Social History:   Marital Status:  >50 years  Children: Yes twin sons Paul and Walker   Living Situation Prior to Admit: Home with family   Is Patient Confused: No    Substance History:   reports that she has never smoked. She has never used smokeless tobacco.  reports current alcohol use.  reports no history of drug use.    Spiritual Assessment:   Jain - Parish Not Listed  Spiritual care following    Past Medical History/Past Surgical History:   This is the 1st hospitalization in the past several years.     Medical History: obtained from [facility]  Past Medical History:    DVT (deep venous thrombosis) ([facility])    Fracture dislocation of left shoulder joint    w/ nerve injury 2009    Injury of nerve, shoulder or arm    w/ dislocation fx of L shoulder 2009    Nerve injury    nerve damage left hand 2009     Past Surgical History:   Procedure Laterality Date    Colonoscopy N/A 8/10/2017    Procedure: COLONOSCOPY;  Surgeon: Sony Galvez MD;  Location: [facility] ENDOSCOPY    Tubal ligation         Family History: obtained from [facility]  Family History   Problem Relation Age of Onset    Alcohol and Other Disorders Associated Father     Diabetes Paternal Aunt     Diabetes Maternal Uncle     No Known Problems Son     No Known Problems Son     Breast Cancer Neg     Ovarian Cancer Neg        Allergies:  No Known Allergies    Medications:     Current Facility-Administered Medications:     metoprolol tartrate (Lopressor) tab 25 mg, 25 mg, Oral, 2x Daily(Beta Blocker)    heparin (Porcine) 5000 UNIT/ML injection 5,000 Units, 5,000 Units, Subcutaneous, 2 times per day    ipratropium-albuterol (Duoneb) 0.5-2.5 (3) MG/3ML inhalation solution 3 mL, 3 mL, Nebulization, Q6H PRN    piperacillin-tazobactam (Zosyn) 3.375 g in dextrose 5% 100 mL IVPB-ADDV, 3.375 g, Intravenous, Q8H    pantoprazole (Protonix) 40 mg in sodium chloride 0.9% PF 10 mL IV push, 40 mg, Intravenous, Daily    bisacodyl (Dulcolax) 10  MG rectal suppository 10 mg, 10 mg, Rectal, Q12H PRN    dextrose 5% infusion, , Intravenous, Continuous    escitalopram (Lexapro) tab 5 mg, 5 mg, Oral, Nightly    OLANZapine (ZyPREXA) tab 2.5 mg, 2.5 mg, Oral, Nightly    clonazePAM (KLONOPIN) disintegrating tab 0.25 mg, 0.25 mg, Oral, BID PRN    lactulose (CHRONULAC) 10 GM/15ML solution 30 g, 30 g, Oral, Once    metoprolol (Lopressor) 5 mg/5mL injection 5 mg, 5 mg, Intravenous, Q4H PRN    [Held by provider] apixaban (Eliquis) tab 5 mg, 5 mg, Oral, BID    traMADol (Ultram) tab 50 mg, 50 mg, Oral, Q8H PRN    ondansetron (Zofran) 4 MG/2ML injection 4 mg, 4 mg, Intravenous, Q6H PRN    acetaminophen (Tylenol) tab 650 mg, 650 mg, Oral, Q6H PRN    hydrALAzine (Apresoline) 20 mg/mL injection 10 mg, 10 mg, Intravenous, Q4H PRN    alum-mag hydroxide-simethicone (Maalox) 200-200-20 MG/5ML oral suspension 30 mL, 30 mL, Oral, QID PRN    HYDROmorphone (Dilaudid) 1 MG/ML injection 0.5 mg, 0.5 mg, Intravenous, Q2H PRN **OR** HYDROmorphone (Dilaudid) 1 MG/ML injection 1 mg, 1 mg, Intravenous, Q4H PRN    thiamine (Vitamin B1) tab 100 mg, 100 mg, Oral, Daily    Functional Status History:  ADLs: bathing or showering, dressing, getting in and out of bed or a chair, walking, using the toilet, and eating  - HIGH  5+ performance deficits   IADLs: use the phone, shop for groceries, meal preparation, manage medicines, clean living area, use transportation by self, manage money  - HIGH  5+ performance deficits   DME: Walker    Palliative Performance Scale:   Prior to admission Palliative performance scale PPSv2 (%): 40 (pt/family reported)   Current Palliative performance scale PPSv2 (%): 30   % Ambulation Activity Level Self-Care Intake Consciousness   100 Full  Normal  No Disease Full Normal Full   90 Full  Normal  Some Disease Full Normal Full   80 Full  Normal w/effort  Some Disease Full Normal or reduced Full   70 Reduced  Can't Perform Job  Some Disease Full Normal or reduced Full   60  Reduced  Can't Perform Hobby   Significant Disease Occ Assist Normal or reduced Full or confused   50 Mainly sit/lie Can't do any work  Extensive Disease Partial Assist Normal or reduced Full or confused   40 Mainly in bed Can't do any work  Extensive Disease Mainly Assist Normal or reduced Full or confused   30 Bed Bound Can't do any work  Extensive Disease Max Assist  Total Care Reduced  Drowsy/confused   20 Bed Bound Can't do any work  Extensive Disease Max Assist  Total Care Minimal  Drowsy/confused   10 Bed Bound Can't do any work  Extensive Disease Max Assist  Total Care Mouth Care  Drowsy/confused   0 Death        Objective      Vital Signs:  Blood pressure 158/81, pulse 101, temperature 97.3 °F (36.3 °C), temperature source Temporal, resp. rate 20, height 5' 1\" (1.549 m), weight 93 lb 0.6 oz (42.2 kg), SpO2 93%.  Body mass index is 17.58 kg/m².  Non-verbal signs of pain present: No    Physical Exam:  General:  managed to be awake for a good portion of discussion, but fell asleep toward the end . In no apparent respiratory distress. Body habitus Thin   HEENT: excessive oral secretions. Soft voice   Lungs:  NC  Abdomen: Soft, non-distended. TTP in suprapubic and RLQ   Extremities: Les in boots. Thin.   Neurologic: Aox3. Slow speech. Muscle strength UE 2-3/5; LE <2/5 during my exam. Pt was very tired at this point.   Psychiatric: Mood  down/anxious at times, but cooperative and answers questions appropriately     Skin: Warm and dry.    Hematology:  Lab Results   Component Value Date    WBC 6.8 10/01/2024    HGB 10.1 (L) 10/01/2024    HCT 30.6 (L) 10/01/2024    .0 10/01/2024       Coags:No results found for: \"PT\", \"INR\", \"PTT\"    Chemistry:  Lab Results   Component Value Date    CREATSERUM 1.30 (H) 10/01/2024    BUN 36 (H) 10/01/2024     10/01/2024    K 3.5 10/01/2024     10/01/2024    CO2 23.0 10/01/2024     (H) 10/01/2024    CA 10.4 10/01/2024    ALB 2.4 (L) 09/30/2024    ALKPHO 84  09/30/2024    BILT 0.9 09/30/2024    TP 5.2 (L) 09/30/2024    AST 36 (H) 09/30/2024    ALT 10 09/30/2024    DDIMER 2.80 (H) 09/28/2024    MG 2.7 (H) 10/01/2024    MG 2.6 10/01/2024    PHOS 5.1 10/01/2024       Imaging:  US VENOUS DOPPLER LEG BILAT - DIAG IMG (CPT=93970)    Result Date: 9/30/2024  CONCLUSION: Normal examination.     Dictated by (CST): Jordan Dawn MD on 9/30/2024 at 2:30 PM     Finalized by (CST): Jordan Dawn MD on 9/30/2024 at 2:31 PM          XR CHEST AP PORTABLE  (CPT=71045)    Result Date: 9/30/2024  CONCLUSION:  1. Borderline cardiomegaly.  Tortuous aorta. 2. Bibasilar parenchymal abnormality and/or atelectatic changes, worse on the left. 3. Small bilateral effusions, greater on left.  Improved aeration right lung base..    Dictated by (CST): Adonis Issa MD on 9/30/2024 at 8:04 AM     Finalized by (CST): Adonis Issa MD on 9/30/2024 at 8:05 AM           Summary of Discussion      I discussed reason for palliative care consultation with patient and spouse.    I differentiated the palliative treatment-focus model versus the hospice comfort-focused philosophy of care. I informed the patient/family that having palliative care support does not limit medical treatment options or decisions to those who wish to continue curative or restorative medical therapies. I discussed the benefits of palliative care to include assistance with arising symptom management needs, an extra layer of support, to ensure GOC are respected throughout healthcare continuum, and assist with transition to hospice care when appropriate.      I provided brief overview of Medicare hospice benefit along with hospice philosophy and answered all questions. At this time, Julissa Ferro condition does qualify for hospice services but goals do not align with hospice philosophy at this time.    Prognostic awareness/understanding: Remains in the information gathering phase   Per Andres, the physical decline has been  most prominent over the past 3 months. Prior to this, she was losing weight and with a poor appetite, but still driving. Now, Andres states that he does all household chores and helps pt dress and walk with a walker. She has progressively gotten weaker since admission and Anders remains concerned. They expressed that they have not received any answers as to why she has declined or why she can no longer eat.   We discussed physical decline and concern regarding aspiration. FTT diagnosis discussed.     Hopes/goals:   Andres expressed wanting to get Devika back to where she was a few months ago so that they may resume their lives together. They have been  over 50 years and life without her in it is something he cannot fathom. He was emotional during visit.   To continue to see their twin boys. Andres and Devika have had a difficult time over the past 3 years with one son and 8 months with the other. Besides yesterday, they have had very little interaction with their sons or grandchildren.     Fears/concerns:   Ongoing decline. Devika does not want to be in a position worse off than she is now. This was discovered during code status discussion. She hopes to walk again and eat again, but recognizes her current situation. She worries about further decline.   Devika worries about her inability to eat. We discussed short and long term options and goals. Ideally, Devika would like to eat again, but acknowledges that her appetite has been very poor for months. She would like to try to get better and would consider a DHT/NG tube temporarily. She is not decided on G tube at this time.     Provided emotional support to  Devika and Andres .    Advance Care Planning counseling and discussion:    POA Documentation Reports completion but not in Epic (family requested to bring in).   We voluntarily discussed the risks vs benefits of life sustaining treatments in the setting of comorbid medical conditions with patient and . CPR  and POLST form thoroughly discussed. No decision was made, which defaults to full code.   POLST FORM Not completed & Form provided for review only.   Prior (full code)     Assessment and Recommendations        Principal Problem:    Weakness generalized  Active Problems:    Hypercalcemia    Anemia    Failure to thrive in adult    Severe major depression, single episode, without psychotic features (HCC)   Palliative Care encounter    Goals of care: established and treatment focused   Pt states that she is willing to try DHT/NG for nutritional support.   They are willing to have palliative care follow up while inpatient for ongoing support and decision making.   Code Status: Prior (full code)   Healthcare Agent's Name: per pt son Walker Ferro    Discussed today's visit with ICU APN, Spouse and RN.    Palliative Care Follow Up: Palliative care team will Continue to follow while inpatient.  Palliative care follow up outpatient: TBD.    Thank you for allowing Palliative Care services to participate in the care of Julissa Ferro.    A total of  95  minutes were spent on this consult, which included all of the following: chart review, direct face to face contact, history taking, physical examination, counseling and coordinating care, and documentation. >16 minutes spent on ACP (POLST discussed at length, pt preferences)     Laurie Dawson MD  10/1/2024  2:49 PM  Palliative Care Services    The 21st Century Cures Act makes medical notes like these available to patients in the interest of transparency. Please be advised this is a medical document. Medical documents are intended to carry relevant information, facts as evident, and the clinical opinion of the practitioner. The medical note is intended as peer to peer communication and may appear blunt or direct. It is written in medical language and may contain abbreviations or verbiage that are unfamiliar.

## 2024-10-01 NOTE — SLP NOTE
ADULT SWALLOWING RE EVALUATION    ASSESSMENT    ASSESSMENT/OVERALL IMPRESSION:  PPE REQUIRED. THIS THERAPIST WORE GLOVES FOR DURATION OF EVALUATION. HANDS WASHED UPON ENTRANCE/EXIT.    A swallow re evaluation warranted as pt remains NPO. Pt afebrile with very weak/wet vocal quality, on 3L/Min, with oxygen saturation at 93%. Pt with no hx of dysphagia at [facility].   Pt positioned 90 degrees in bed, alert/cooperative/spouse present. Pt with no complaints of pain. Oral motor examination revealed reduced strength, ROM, and rate of motion. Pt presented with limited trials of mildly thick liquids via tsp. Pt with reduced oral acceptance and bilabial seal across all trials. Pt with reduced bolus formation/propulsion. Pt's swallow response appears delayed with significantly reduced to absent  hyolaryngeal elevation/excursion. Wet vocal observed. Trials discontinued d/t poor swallow function. Oral suction utilized to clear oral cavity. 9/30 CXR indicates \"1. Borderline cardiomegaly.  Tortuous aorta. 2. Bibasilar parenchymal abnormality and/or atelectatic changes, worse on the left. 3. Small bilateral effusions, greater on left. Improved aeration right lung base\". Oxygen status remained stable t/o the entire evaluation.     At this time, pt presents with moderate oral dysphagia and probable pharyngeal dysfunction. Recommend remain NPO with ongoing clinical swallow re assessment to determine tx plan. Recommend family meeting to reach consensus on a feeding management plan consistent with pt's goal of care. Results and recommendations reviewed with RN, pt, and family. Pt/pt's family v/u to all results/recommendations. Recommendations remain written on whiteboard.       RECOMMENDATIONS   Diet Recommendations - Solids: NPO  Diet Recommendations - Liquids: NPO                    Compensatory Strategies Recommended:  (n/a)  Aspiration Precautions:  (n/a)  Medication Administration Recommendations: Non-oral  Treatment  Plan/Recommendations: Aspiration precautions;SLP to reassess    HISTORY   MEDICAL HISTORY  Reason for Referral: RN dysphagia screen;R/O aspiration    Problem List  Principal Problem:    Weakness generalized  Active Problems:    Hypercalcemia    Anemia    Failure to thrive in adult    Severe major depression, single episode, without psychotic features ([facility])      Past Medical History  Past Medical History:    DVT (deep venous thrombosis) ([facility])    Fracture dislocation of left shoulder joint    w/ nerve injury 2009    Injury of nerve, shoulder or arm    w/ dislocation fx of L shoulder 2009    Nerve injury    nerve damage left hand 2009          Diet Prior to Admission: Thin liquids;Regular  Precautions: Aspiration    Patient/Family Goals: did not state    SWALLOWING HISTORY  Current Diet Consistency: NPO  Dysphagia History: none  Imaging Results: 9/29 CT CHEST  CONCLUSION:   No evidence of pulmonary embolism to the bilateral segmental arterial level.   Small bilateral pleural effusions.  Near complete bilateral lower lobe consolidation which could reflect atelectasis with or without superimposed pneumonia.   Mild patchy ground-glass opacity in the superior segment right lower lobe and right upper lobe with mild reticulonodular opacity which may be infectious/inflammatory in etiology.   Small pericardial effusion.   Lesser incidental findings as above.         SUBJECTIVE       OBJECTIVE   ORAL MOTOR EXAMINATION  Dentition: Functional  Symmetry: Within Functional Limits  Strength: Unable to assess  Tone: Unable to assess  Range of Motion: Unable to assess  Rate of Motion: Reduced    Voice Quality: Weak  Respiratory Status: Supplemental O2;Nasal cannula  Consistencies Trialed: Thin liquids;Nectar thick liquids;Puree  Method of Presentation: Staff/Clinician assistance;Spoon;Cup;Single sips  Patient Positioning: Upright;Midline    Oral Phase of Swallow: Impaired  Bolus Retrieval: Intact  Bilabial Seal: Impaired  Bolus  Formation: Impaired  Bolus Propulsion: Impaired  Mastication: Impaired  Retention: Impaired    Pharyngeal Phase of Swallow: Impaired  Laryngeal Elevation Timing: Appears impaired  Laryngeal Elevation Strength: Appears impaired  Laryngeal Elevation Coordination: Appears impaired  (Please note: Silent aspiration cannot be evaluated clinically. Videofluoroscopic Swallow Study is required to rule-out silent aspiration.)    Esophageal Phase of Swallow: No complaints consistent with possible esophageal involvement  Comments: NA          GOALS  Goal #1 Patient will participate in ongoing clinical bedside swallow evaluation as appropriate.    In Progress     FOLLOW UP  Treatment Plan/Recommendations: Aspiration precautions;SLP to reassess  Number of Visits to Meet Established Goals: 1  Follow Up Needed (Documentation Required): Yes  SLP Follow-up Date: 10/02/24    Thank you for your referral.   If you have any questions, please contact LEE James M.S., CCC-SLP  Speech Language Pathologist  Phone Number [phone]

## 2024-10-01 NOTE — PROGRESS NOTES
Progress Note     Julissa Ferro Patient Status:  Inpatient    1945 MRN [id number]   Location [facility] 5SW/SE Attending Brady Cole MD   Hosp Day # 4 PCP Jesus Kirkland MD     Chief Complaint:   Chief Complaint   Patient presents with    Fatigue         Subjective:   S: Patient seen and examined, chart reviewed.   Patient failed swallow eval again.  Initially refusing NGT but after talking to palliative care she is now agreeable.   No worsening sob.  No chest pain.  No fevers/chills.    Review of Systems:   10 point ROS reviewed and otherwise neg                Objective:   Vital signs:  Temp:  [96.9 °F (36.1 °C)-98.1 °F (36.7 °C)] 98.1 °F (36.7 °C)  Pulse:  [] 93  Resp:  [13-38] 29  BP: (132-176)/(57-96) 164/88  SpO2:  [78 %-100 %] 92 %  FiO2 (%):  [40 %-100 %] 40 %    Wt Readings from Last 6 Encounters:   10/01/24 93 lb 0.6 oz (42.2 kg)   24 96 lb (43.5 kg)   24 96 lb 12.8 oz (43.9 kg)   24 100 lb 3.2 oz (45.5 kg)   24 102 lb 6.4 oz (46.4 kg)   24 104 lb 12.8 oz (47.5 kg)       Physical Exam:      Gen: now resting comfortably  Pulm: decreased bs  CV: sinus tachy  Abd: Abdomen soft, nontender, nondistended, bowel sounds present  Neuro: No acute focal deficits  MSK: moves extremities  Skin: Warm and dry  Psych: Normal affect  Ext: no cyanosis      Results:   Diagnostic Data:      Labs:    Labs Last 24 Hours:   BMP     CBC    Other     Na 136 Cl 103 BUN 36 Glu 104   Hb 10.1   PTT - Procal -   K 3.5 CO2 23.0 Cr 1.30   WBC 6.8 >< .0  INR - CRP -   Renal Lytes Endo    Hct 30.6   Trop - D dim -   eGFR - Ca 10.4 POC Gluc  110    LFT   pBNP - Lactic -   eGFR AA - PO4 5.1 A1c -   AST - APk - Prot -  LDL -      Recent Labs   Lab 24  1220 24  0516 10/01/24  0405   RBC 3.48* 3.26* 3.73*   HGB 9.6* 8.9* 10.1*   HCT 29.1* 29.2* 30.6*   MCV 83.6 89.6 82.0   MCH 27.6 27.3 27.1   MCHC 33.0 30.5* 33.0   RDW 17.0* 17.1* 16.9*   NEPRELIM 16.06*  13.77* 5.93   WBC 17.3* 14.6* 6.8   .0 347.0 413.0       No results found for: \"PT\", \"INR\"    Recent Labs   Lab 09/26/24 2006 09/27/24  0612 09/29/24  0613 09/30/24  0514 10/01/24  0405   *   < > 109* 73 104*   BUN 23   < > 19 17 36*   CREATSERUM 0.81   < > 0.72 1.05* 1.30*   CA 12.2*   < > 9.8 10.7* 10.4   ALB 3.1*  --   --  2.4*  --       < > 134* 131* 136   K 3.5   < > 3.5 4.1 3.5      < > 104 104 103   CO2 30.0   < > 22.0 19.0* 23.0   ALKPHO 90  --   --  84  --    AST 32  --   --  36*  --    ALT 8*  --   --  10  --    BILT 0.6  --   --  0.9  --    TP 5.8  --   --  5.2*  --     < > = values in this interval not displayed.       No results for input(s): \"LEANDRA\", \"LIP\" in the last 168 hours.    Recent Labs   Lab 09/27/24  1235 09/28/24  0755 09/29/24  0613 09/30/24  0514 10/01/24  0405   ESRML  --   --   --   --  41*   MG  --    < > 1.4* 1.7 2.6  2.7*   PHOS  --   --   --   --  5.1   B12 582  --   --   --   --     < > = values in this interval not displayed.       No results for input(s): \"URINE\", \"CULTI\", \"BLDSMR\" in the last 168 hours.      XR CHEST AP PORTABLE  (CPT=71045)    Result Date: 10/1/2024  CONCLUSION:  1. Feeding tube tip in proximal gastric antrum. 2. Bibasilar atelectasis and or pneumonia.  Improved aeration right lung base.    Dictated by (CST): Jomar Andre MD on 10/01/2024 at 4:54 PM     Finalized by (CST): Jomar Andre MD on 10/01/2024 at 4:55 PM          US VENOUS DOPPLER LEG BILAT - DIAG IMG (CPT=93970)    Result Date: 9/30/2024  CONCLUSION: Normal examination.     Dictated by (CST): Jordan Dawn MD on 9/30/2024 at 2:30 PM     Finalized by (CST): Jordan Dawn MD on 9/30/2024 at 2:31 PM          XR CHEST AP PORTABLE  (CPT=71045)    Result Date: 9/30/2024  CONCLUSION:  1. Borderline cardiomegaly.  Tortuous aorta. 2. Bibasilar parenchymal abnormality and/or atelectatic changes, worse on the left. 3. Small bilateral effusions, greater on left.  Improved aeration  right lung base..    Dictated by (CST): Adonis Issa MD on 9/30/2024 at 8:04 AM     Finalized by (CST): Adonis Issa MD on 9/30/2024 at 8:05 AM               Pro-Calcitonin  Recent Labs   Lab 09/29/24  1219   PCT 0.27*       Cardiac  No results for input(s): \"TROP\", \"PBNP\" in the last 168 hours.    Imaging: Imaging data reviewed in [facility].    XR CHEST AP PORTABLE  (CPT=71045)    Result Date: 10/1/2024  CONCLUSION:  1. Feeding tube tip in proximal gastric antrum. 2. Bibasilar atelectasis and or pneumonia.  Improved aeration right lung base.    Dictated by (CST): Jomar Andre MD on 10/01/2024 at 4:54 PM     Finalized by (CST): Jomar Andre MD on 10/01/2024 at 4:55 PM          US VENOUS DOPPLER LEG BILAT - DIAG IMG (CPT=93970)    Result Date: 9/30/2024  CONCLUSION: Normal examination.     Dictated by (CST): Jordan Dawn MD on 9/30/2024 at 2:30 PM     Finalized by (CST): Jordan Dawn MD on 9/30/2024 at 2:31 PM          XR CHEST AP PORTABLE  (CPT=71045)    Result Date: 9/30/2024  CONCLUSION:  1. Borderline cardiomegaly.  Tortuous aorta. 2. Bibasilar parenchymal abnormality and/or atelectatic changes, worse on the left. 3. Small bilateral effusions, greater on left.  Improved aeration right lung base..    Dictated by (CST): Adonis Issa MD on 9/30/2024 at 8:04 AM     Finalized by (CST): Adonis Issa MD on 9/30/2024 at 8:05 AM              Medications:    metoprolol tartrate  25 mg Oral 2x Daily(Beta Blocker)    heparin  5,000 Units Subcutaneous 2 times per day    piperacillin-tazobactam  3.375 g Intravenous Q8H    pantoprazole  40 mg Intravenous Daily    escitalopram  5 mg Oral Nightly    OLANZapine  2.5 mg Oral Nightly    lactulose  30 g Oral Once    [Held by provider] apixaban  5 mg Oral BID    thiamine  100 mg Oral Daily       Assessment & Plan:   ASSESSMENT / PLAN:     79-year-old female presents the emergency room with generalized weakness inability to ambulate and profound weight  loss over the past 1 year of greater than 40 pounds.  Her labs are significant for hypercalcemia leukopenia and anemia.     Acute hypoxemic respiratory failure  Possible Aspiration pneumonia  Transferred to PCU  Pulm/Crit Care consulted  Started IV zosyn  Cultures pending  Weaned off bipap and on to NC  Failed speech eval - repeat also failed  Plan to start TFs    Failure to thrive  Severe Depression (situational) and asthenia - has not seen or talked to her twin boys in nearly 3 years, they have been shut off by them for specific reasons.  One, three years ago, for insulting comments and the other for unknown reasons and he has a hx of mental health problems (SI).   Psych consulted, lito recs  Palliative care consulted    Urinary retention  Bilateral non-obstructing calculus.   Seen by urology  Plan to cont anaya for now  Repeat renal us in 2 days to assess for improvement    Normocytic anemia  Iron deficiency  Heme/onc following  Transfuse for hemoglobin less than 6  Check fecal occult blood test  Start ferric gluconate 125 mg daily for 3 days    Hypercalcemia  Dehydration  Severe protein caloric malnutrition, BMI 17  IV fluids initiated - will decrease now   Will hold calcium supplements for now,  PTH levels are low.  Check PTH related protein levels   Check vitamin D levels   PT/OT for deconditioning        dvt prophylaxis: eliquis  code status: full code  dispo: home upon medical clearance     MDM: high     Estimated date of discharge: To be determined  Discharge is dependent on: Diagnosis being made in clinical improvement  At this point Ms. Ferro is expected to be discharge to: Home versus rehab     Plan of care discussed with  and patient, also psych and nurse

## 2024-10-01 NOTE — DIETARY NOTE
ADULT NUTRITION INITIAL ASSESSMENT      RECOMMENDATIONS TO MD: EN order- slower progression to minimize refeeding syndrome-monitor k,mg,P daily while EN advancing for need to replete. Recommend electrolyte protocol.      Pt is at high nutrition risk.  Pt meets severe malnutrition criteria.      CRITERIA FOR MALNUTRITION DIAGNOSIS: Criteria for severe malnutrition diagnosis: chronic illness related to wt loss greater than 20% in 1 year, energy intake less than 75% for greater than 1 month, body fat severe depletion, and muscle mass severe depletion.     ADMITTING DIAGNOSIS:  Weakness generalized [R53.1]     PERTINENT PAST MEDICAL HISTORY:    Past Medical History:    DVT (deep venous thrombosis) ([facility])    Fracture dislocation of left shoulder joint    w/ nerve injury 2009    Injury of nerve, shoulder or arm    w/ dislocation fx of L shoulder 2009    Nerve injury    nerve damage left hand 2009    has a past surgical history that includes tubal ligation and colonoscopy (N/A, 8/10/2017).     PATIENT STATUS: Initial 09/27/24: Patient (pt) identified at Nutrition risk due to poor po and unintentional wt loss after the screening process.   Medical findings:  Failure to thrive, decreased appetite, poor oral intake, weight loss.   Upon visit, pt laying in bed, spouse and dtr in law at bedside. Reports ate oatmeal this am and sweet potatoes for lunch. RN reports pt with somewhat better intake. Diet hx/eval:    prolonged inadequate nutrition intake based on reported 2-3 very small meals, consuming 70% each meal. Occasional intake of Ensure, finishing an 8 oz bottle in 2 days. Wt eval:    44# or 31.2% significant wt loss over the past year based on usual wt 136# ( consistent with # in Sept 2023). Compared to current wt of 94#.  Nutrition findings:    progressive severe malnutrition with Cachexia, frailty and low BMI of 17.6 kg/m2.   Suspect chewing difficulty d/t loss of facial muscle mass. Adjusted diet to soft diet for  easy chewing, and Provision for Oral Nutrition supplements (ONS) is also indicated to support the patient’s nutritional needs. Calorie count started x 3 days and will post on Monday result of adequacy. B1 added. Ultimately, consider temporary Nutrition Support Therapy ( d/w pt & family and RN)  to Supplement oral intake and  improve nutritional status and gain wt.   10/1 Update:. Po intake has remained poor and NPO since 9/29 after failed BSSE. Palliative eval today and pt agreed to trial of temporary tube feeds. Consult received. See orders. Monitor labs closely as EN advancing slower to goal to minimize refeeding risk in this malnourished pt. Continue thiamine daily.     FOOD/NUTRITION INTAKE ANALYSIS:    Current Appetite: poor  Current Intake:   Current Intake Meeting Needs: Once EN to goal.   Percent Meals Eaten (last 6 days)       Date/Time Percent Meals Eaten (%)    09/27/24 0828 80 %    09/27/24 1509 55 %    09/27/24 1956 85 %    09/29/24 0835 0 %     Percent Meals Eaten (%): pt npo at 09/29/24 0835    09/29/24 1227 0 %     Percent Meals Eaten (%): pt npo at 09/29/24 1227    09/29/24 1500 0 %           Food Allergies: No Known Food Allergies (NKFA)  Cultural/Ethnic/Congregation Preferences: None    GASTROINTESTINAL: +BM 10/1 small smear. On lactulose.     MEDICATIONS: reviewed     dextrose Stopped (09/29/24 1229)        dextrose Stopped (09/29/24 1229)      metoprolol tartrate  25 mg Oral 2x Daily(Beta Blocker)    heparin  5,000 Units Subcutaneous 2 times per day    piperacillin-tazobactam  3.375 g Intravenous Q8H    pantoprazole  40 mg Intravenous Daily    escitalopram  5 mg Oral Nightly    OLANZapine  2.5 mg Oral Nightly    lactulose  30 g Oral Once    [Held by provider] apixaban  5 mg Oral BID    thiamine  100 mg Oral Daily     Prn:   ipratropium-albuterol    bisacodyl    clonazePAM    metoprolol    traMADol    ondansetron    acetaminophen    hydrALAzine    alum-mag hydroxide-simethicone    HYDROmorphone  **OR** HYDROmorphone    LABS: reviewed noted steroids 10/1. Creat is elevated. U/o is low.   Recent Labs     09/29/24  0613 09/30/24  0514 10/01/24  0405   * 73 104*   BUN 19 17 36*   CREATSERUM 0.72 1.05* 1.30*   CA 9.8 10.7* 10.4   MG 1.4* 1.7 2.6  2.7*   * 131* 136   K 3.5 4.1 3.5    104 103   CO2 22.0 19.0* 23.0   PHOS  --   --  5.1   OSMOCALC 281 272* 291       NUTRITION RELATED PHYSICAL FINDINGS:  - Nutrition Focused Physical Exam (NFPE): severe depletion body fat orbital region and triceps region, moderate depletion muscle mass thigh region and calf region, and severe depletion muscle mass temple region, clavicle region, scapular region, shoulder region, and dorsal kramer region   - Fluid Accumulation: none  see RN documentation for details  - Skin Integrity: at risk see RN documentation for details    ANTHROPOMETRICS:  HT: 154.9 cm (5' 1\")  WT: 42.2 kg (93 lb 0.6 oz) 9/26 76 lbs but 94 lbs next day. ? Accuracy of admit 76 lbs.   BMI: Body mass index is 17.58 kg/m².  BMI CLASSIFICATION: less than 19 kg/m2 - underweight  IBW: 105 lbs        90% IBW  Usual Body Wt: 136-138 lbs both reported by pt and c/w emr wt hx.       68% UBW  WEIGHT HISTORY:    Patient Weight(s) for the past 336 hrs:   Weight   10/01/24 0600 42.2 kg (93 lb 0.6 oz)   09/29/24 0559 44.1 kg (97 lb 3.2 oz)   09/28/24 0535 44.4 kg (97 lb 12.8 oz)   09/27/24 0031 42.6 kg (94 lb)   09/26/24 2004 34.5 kg (76 lb)     Wt Readings from Last 10 Encounters:   10/01/24 42.2 kg (93 lb 0.6 oz)   08/19/24 43.5 kg (96 lb)   08/13/24 43.9 kg (96 lb 12.8 oz)   06/28/24 45.5 kg (100 lb 3.2 oz)   06/18/24 46.4 kg (102 lb 6.4 oz)   06/06/24 47.5 kg (104 lb 12.8 oz)   05/28/24 48.1 kg (106 lb)   05/14/24 49.7 kg (109 lb 8 oz)   04/02/24 50.8 kg (112 lb)   03/26/24 51 kg (112 lb 6.4 oz)       NUTRITION DIAGNOSIS/PROBLEM:    Severe Malnutrition related to Chronic - Physiological causes resulting in anorexia or diminished intake  as evidenced by  pt/family reported chronic inadequate nutrition intake despite use of ONS, 31.8% significant wt loss over the past year, severe Muscle mass loss and Adipose tissue depletion with Cachexia and frailty, low BMI of 17.7 kg/m2.  NUTRITION DIAGNOSIS PROGRESS:  Improvement (unresolved)to start with EN initiation.     NUTRITION INTERVENTION:     NUTRITION PRESCRIPTION:   Estimated Nutrition needs: Dosing wt of 42.6 kg --wt taken on 9/27 .  Calories: 840 to start (20 kcals/kg) . 1470 calories/day (35 calories per kg Dosing wt) begin 15-20 kcals/kg d/t high risk for refeeding.   Protein: 50- 85 g protein/day (1.2-2.0g protein/kg  Dosing wt)  Fluids: 1 ml/kcal or as tolerated monitoring renal status, u/o.   - Enteral Nutrition: via NG: Osmolite 1.2 25 ml/hr; advance 10 ml q 18 hrs  to goal 55 ml/hr X ave 22 hr infusion time (to goal in 3 days- unless signs of refeeding and will advance 10 ml q 24 hrs) = 1210 ml, 1452 kcals, 67 g protein, 980 ml h20, FWF: 125 ml q 4 hrs to start with low tf rate= 750 ml. Re-assess to adjust as EN rate increases and pending labs, renal function, u/o. At goal will meet 100% initial nutrition needs.   - Diet: NPO      Procedures    NPO      - RD Malnutrition Care Plan: EN slow advance to goal.   - Medical Food Supplements- NPO  - Vitamin and mineral supplements: thiamin/ RD  - Feeding assistance: NPO  - Nutrition education: RN, palliative and MD discussed EN with pt and family.   - Coordination of nutrition care: collaboration with other providers and discussed at rounds.   - Discharge and transfer of nutrition care to new setting or provider: monitor plans      MONITOR AND EVALUATE/NUTRITION GOALS:  - Food and Nutrient Intake:   NPO  - Food and Nutrient Administration:    Monitor: tolerance to enteral nutrition, adequacy of enteral nutrition, and for enteral nutrition adjustment. Monitor need to change formula if renal functio worsens.   - Anthropometric Measurement:    Monitor weight  -  Nutrition Goals:    TF meet >80% of goal within first week , labs within acceptable limits, minimize lean body mass loss, support body systems, and minimize refeeding risk.     RD will follow up.    Mariangel Sierra RD, LDN, [facility] ([id number])

## 2024-10-02 NOTE — PLAN OF CARE
Problem: Patient Centered Care  Goal: Patient preferences are identified and integrated in the patient's plan of care  Description: Interventions:  - What would you like us to know as we care for you? From home with    - Provide timely, complete, and accurate information to patient/family  - Incorporate patient and family knowledge, values, beliefs, and cultural backgrounds into the planning and delivery of care  - Encourage patient/family to participate in care and decision-making at the level they choose  - Honor patient and family perspectives and choices  Outcome: Progressing     Problem: Patient/Family Goals  Goal: Patient/Family Long Term Goal  Description: Patient's Long Term Goal: Feel better    Interventions:  - Monitor vital signs  - Monitor appropriate labs  - Administer medications per order  - Pain management as needed  - Follow MD orders  - Diagnostics per orders  - Update / inform patient and family on plan of care  - Discharge planning    - See additional Care Plan goals for specific interventions  Outcome: Progressing  Goal: Patient/Family Short Term Goal  Description: Patient's Short Term Goal: Improve energy     Interventions:   Monitor vital signs  - Monitor appropriate labs  - Administer medications per order  - Pain management as needed  - Follow MD orders  - Diagnostics per orders  - Update / inform patient and family on plan of care  - Discharge planning      - See additional Care Plan goals for specific interventions  Outcome: Progressing     Problem: SAFETY ADULT - FALL  Goal: Free from fall injury  Description: INTERVENTIONS:  - Assess pt frequently for physical needs  - Identify cognitive and physical deficits and behaviors that affect risk of falls.  - [place] fall precautions as indicated by assessment.  - Educate pt/family on patient safety including physical limitations  - Instruct pt to call for assistance with activity based on assessment  - Modify environment to reduce  risk of injury  - Provide assistive devices as appropriate  - Consider OT/PT consult to assist with strengthening/mobility  - Encourage toileting schedule  Outcome: Progressing     Problem: GENITOURINARY - ADULT  Goal: Absence of urinary retention  Description: INTERVENTIONS:  - Assess patient’s ability to void and empty bladder  - Monitor intake/output and perform bladder scan as needed  - Follow urinary retention protocol/standard of care  - Consider collaborating with pharmacy to review patient's medication profile  - Implement strategies to promote bladder emptying  Outcome: Progressing     Problem: SKIN/TISSUE INTEGRITY - ADULT  Goal: Skin integrity remains intact  Description: INTERVENTIONS  - Assess and document risk factors for pressure ulcer development  - Assess and document skin integrity  - Monitor for areas of redness and/or skin breakdown  - Initiate interventions, skin care algorithm/standards of care as needed  Outcome: Progressing  Goal: Incision(s), wounds(s) or drain site(s) healing without S/S of infection  Description: INTERVENTIONS:  - Assess and document risk factors for pressure ulcer development  - Assess and document skin integrity  - Assess and document dressing/incision, wound bed, drain sites and surrounding tissue  - Implement wound care per orders  - Initiate isolation precautions as appropriate  - Initiate Pressure Ulcer prevention bundle as indicated  Outcome: Progressing     Problem: Impaired Functional Mobility  Goal: Achieve highest/safest level of mobility/gait  Description: Interventions:  - Assess patient's functional ability and stability  - Promote increasing activity/tolerance for mobility and gait  - Educate and engage patient/family in tolerated activity level and precautions  Outcome: Progressing

## 2024-10-02 NOTE — PROGRESS NOTES
[facility]  part of [facility] Hospitalist Progress Note     Julissa Ferro Patient Status:  Inpatient    1945 MRN [id number]   Location [facility] 5SW/SE Attending Michael Chamberlain MD   Hosp Day # 5 PCP Jesus Kirkland MD     Chief Complaint:   Chief Complaint   Patient presents with    Fatigue        Subjective:     Patient seen lying in bed.   bedside.  Patient remains generally weak and tired.  Denies subjective shortness of breath.    Objective:      Vital signs:  Vitals:    10/02/24 0300 10/02/24 0400 10/02/24 0447 10/02/24 0500   BP: 141/86 142/82 150/76    BP Location: Right arm Right arm Right arm    Pulse: 106 103 106    Resp: (!) 32 (!) 27 24    Temp:  98 °F (36.7 °C) 97.4 °F (36.3 °C)    TempSrc:  Temporal Axillary    SpO2: 97% 98% 100% 97%   Weight:       Height:           Intake/Output Summary (Last 24 hours) at 10/2/2024 0843  Last data filed at 10/2/2024 0700  Gross per 24 hour   Intake 359 ml   Output 226 ml   Net 133 ml           Physical Exam:    GENERAL: Thin, frail and ill-appearing female.  Awake and alert, in no acute distress.  HEART:  Regular rhythm, regular rate  LUNGS:  Air entry was decreased.  No increased work of breathing or wheezes   ABDOMEN: Soft and non-tender.    PSYCHIATRIC: Normal mood    Diagnostic Data:    Labs:    Recent Labs   Lab 24  0516 10/01/24  0405 10/02/24  0404   WBC 14.6* 6.8 4.1   HGB 8.9* 10.1* 10.4*   MCV 89.6 82.0 81.0   .0 413.0 481.0*       Recent Labs   Lab 24  0612 24  0514 10/01/24  0405 10/02/24  0404   *   < > 73 104* 132*   BUN 23   < > 17 36* 46*   CREATSERUM 0.81   < > 1.05* 1.30* 1.61*   CA 12.2*   < > 10.7* 10.4 10.2   ALB 3.1*  --  2.4*  --   --       < > 131* 136 136   K 3.5   < > 4.1 3.5 2.9*      < > 104 103 104   CO2 30.0   < > 19.0* 23.0 22.0   ALKPHO 90  --  84  --   --    AST 32  --  36*  --   --    ALT 8*  --  10  --   --    BILT  0.6  --  0.9  --   --    TP 5.8  --  5.2*  --   --     < > = values in this interval not displayed.           Estimated Creatinine Clearance: 18.9 mL/min (A) (based on SCr of 1.61 mg/dL (H)).    No results for input(s): \"PTP\", \"INR\" in the last 168 hours.         COVID-19  No results found for: \"COVID19\"    Pro-Calcitonin  Recent Labs   Lab 09/29/24  1219   PCT 0.27*       Cardiac  No results for input(s): \"TROP\", \"PBNP\" in the last 168 hours.    Inflammatory Markers  Recent Labs   Lab 09/26/24 2006 09/27/24  1235 09/28/24  1543   CHERELLE 619*  --   --    LDH  --  217  --    DDIMER  --   --  2.80*       Culture:  Hospital Encounter on 09/26/24   1. Urine Culture, Routine     Status: None    Collection Time: 09/26/24 10:36 PM    Specimen: Urine, clean catch   Result Value Ref Range    Urine Culture  N/A     <10,000 cfu/ml Multiple species present- probable contamination.       XR CHEST AP PORTABLE  (CPT=71045)    Result Date: 10/2/2024  CONCLUSION:   New ovoid opacity at the right lung base.  Could represent round pneumonia or atelectasis.  Recommend attention on follow-up.  Small left pleural effusion with left basilar opacity, unchanged.    Dictated by (CST): Ariane Muro MD on 10/02/2024 at 7:46 AM     Finalized by (CST): Ariane Muro MD on 10/02/2024 at 7:48 AM          XR CHEST AP PORTABLE  (CPT=71045)    Result Date: 10/1/2024  CONCLUSION:  1. Feeding tube tip in proximal gastric antrum. 2. Bibasilar atelectasis and or pneumonia.  Improved aeration right lung base.    Dictated by (CST): Jomar Andre MD on 10/01/2024 at 4:54 PM     Finalized by (CST): Jomar Andre MD on 10/01/2024 at 4:55 PM          US VENOUS DOPPLER LEG BILAT - DIAG IMG (CPT=93970)    Result Date: 9/30/2024  CONCLUSION: Normal examination.     Dictated by (CST): Jordan Dawn MD on 9/30/2024 at 2:30 PM     Finalized by (CST): Jordan Dawn MD on 9/30/2024 at 2:31 PM          XR CHEST AP PORTABLE  (CPT=71045)    Result Date:  9/30/2024  CONCLUSION:  1. Borderline cardiomegaly.  Tortuous aorta. 2. Bibasilar parenchymal abnormality and/or atelectatic changes, worse on the left. 3. Small bilateral effusions, greater on left.  Improved aeration right lung base..    Dictated by (CST): Adonis Issa MD on 9/30/2024 at 8:04 AM     Finalized by (CST): Adonis Issa MD on 9/30/2024 at 8:05 AM          CT CHEST PE AORTA (IV ONLY) (CPT=71260)    Result Date: 9/29/2024  CONCLUSION:   No evidence of pulmonary embolism to the bilateral segmental arterial level.  Small bilateral pleural effusions.  Near complete bilateral lower lobe consolidation which could reflect atelectasis with or without superimposed pneumonia.  Mild patchy ground-glass opacity in the superior segment right lower lobe and right upper lobe with mild reticulonodular opacity which may be infectious/inflammatory in etiology.  Small pericardial effusion.  Lesser incidental findings as above.    Dictated by (CST): Feng Carranza MD on 9/29/2024 at 1:10 PM     Finalized by (CST): Feng Carranza MD on 9/29/2024 at 1:16 PM          XR CHEST AP PORTABLE  (CPT=71045)    Result Date: 9/29/2024  CONCLUSION:  Right greater than left bilateral basilar opacities increased on the right.  On the right, this may reflect atelectasis with or without superimposed pneumonia.  Left basilar opacity may reflect a combination of small effusion and parenchymal opacity.       Dictated by (CST): Feng Carranza MD on 9/29/2024 at 11:20 AM     Finalized by (CST): Feng Carranza MD on 9/29/2024 at 11:22 AM                 Medications:    potassium chloride  40 mEq Intravenous Once    Followed by    potassium chloride  20 mEq Intravenous Once    metoprolol tartrate  25 mg Oral 2x Daily(Beta Blocker)    heparin  5,000 Units Subcutaneous 2 times per day    piperacillin-tazobactam  3.375 g Intravenous Q8H    pantoprazole  40 mg Intravenous Daily    escitalopram  5 mg Oral Nightly    lactulose   30 g Oral Once    [Held by provider] apixaban  5 mg Oral BID    thiamine  100 mg Oral Daily       Assessment & Plan:       Acute hypoxemic respiratory failure  Possible Aspiration pneumonia  Improving  Pulm consulted  Continue zosyn  Weaned off bipap and on to NC, continue weaning O2 as able  Failed speech re-eval  Started on tube feed    Failure to thrive  Severe Depression (situational) and asthenia - has not seen or talked to her twin boys in nearly 3 years  hx of mental health problems (SI).   Psych consulted, lito recs  Continue to evaluate.  Palliative care on consult, may need discussion regarding G-tube in future.     Urinary retention  Bilateral non-obstructing calculus.   Seen by urology  Plan to cont anaya for now  Repeat renal us to assess for improvement     Normocytic anemia  Iron deficiency  Heme/onc following  Transfuse for hemoglobin less than 7  fecal occult blood negative  Continue ferric gluconate      Hypercalcemia  Dehydration  Severe protein caloric malnutrition, BMI 17  Weaning IV fluids initiated  Will hold calcium supplements for now,  PTH levels are low.    PT/OT for deconditioning    Dietitian Malnutrition Assessment    Evaluation for Malnutrition: Criteria for severe malnutrition diagnosis- chronic illness related to   Wt loss greater than 20% in 1 year., Energy intake less than 75% for greater than 1 month., Body fat severe depletion., Muscle mass severe depletion.               RD Malnutrition Care Plan: Encouraged increased PO intake., Encouraged small frequent meals with emphasis on high calorie/high protein., Intiated ONS (oral nutritional supplements)., Requested  with ordering meals, tray set up and/or feeding as needed., Ordered thiamin.    Body Fat/Muscle Mass: Severe depletion body fat., Moderate depletion muscle mass., Severe depletion muscle mass. orbital region., triceps region. temple region., clavicle region., scapular region., shoulder region., dorsal  kramer region., thigh region., calf region.    Physician Assessment     Patient has a diagnosis of severe malnutrition        Plan of care discussed with patient and family at bedside . Discussed management/test result(s) with Rn and Neurology consultant    Quality:  DVT Prophylaxis: Eliquis  CODE status: Full  Estimated date of discharge: TBD  Discharge is dependent on: clinical stability    54 minutes spent discussing with other providers, examining patient, obtaining history, reviewing medical records, interpreting and communicating test results/imaging, ordering tests/medications, discussing plan of care and documenting information.      Michael Chamberlain MD          This note was prepared using Dragon Medical voice recognition dictation software. As a result errors may occur. When identified these errors have been corrected. While every attempt is made to correct errors during dictation discrepancies may still exist

## 2024-10-02 NOTE — PROGRESS NOTES
Double RN skin check done prior to transfer off Unit. Skin check performed by this RN and Khris MCCLELLAND.      Wounds are as follows: Redness to sacrum and excoriation to perineal area. Generalized redness.      Will remain available for any further questions or concerns.

## 2024-10-02 NOTE — PROGRESS NOTES
Patient is a 79 year old   female with past medical history of HTN, osteopenia, DVT, and anorexia who was admitted to the hospital for Weakness generalized. The patient has been demonstrating generalized weakness for past two weeks,accompanied by anorexia, malaise, and weight loss of 50 pounds over past two years. Patient was unable to walk by herself using her walker.  Medical workup was negative for any malignancy.    Consult Duration   The patient seen for over 50-minute, follow-up evaluation, over 50% counseling and coordinating care addressing  depression.  Record reviewed, communication with attending, communication with RN and patient seen face to face evaluation.    History of Present Illness:   Patient transferred back to the medical floor and the patient have NG tube.    The patient laying down in her bed and  at bedside.    Patient smiled upon seeing me and through soft speech stating \"I am feeling better\".  The patient continued demonstrating deterioration physically and having difficulty with her energy, appetite, motivation and concentration.  Otherwise the patient today denying hopelessness reporting that she want to fight and want to be strong?    The patient referring to the visit of her son as something very positive have been during this time and that breaking some hope to her that she would be able to resume hopefully her relationship with her son after and the grandchildren.    The patient has not been demonstrating significant cognitive impairment but due to the extreme fatigue and lethargy she has been in and out with some orientation distortion.    Her  at bedside being very supportive and holding her hand all the time.       The patient is not demonstrating any kei or psychosis  The patient denies auditory or visual hallucinations  The patient denies suicidal or homicidal ideation.     Patient restarted Lexapro last night after the NG tube otherwise she has  not been her medication for 2 days prior to that.  Patient took Zyprexa last night but due to sedation we might need to change Zyprexa to Abilify.     The patient has been demonstrating feelings of hopelessness, helplessness, worthlessness, low mood, low energy, decreased motivation with increased anxiety, worry, ruminations with impairment in sleep. The patient denies any suicidal ideations. The patient is agreeable to medications changes and to follow up with outpatient psychiatry providers.      Past Psychiatric/Medication History:  1. Prior diagnoses: none  2. Past psychiatric inpatient: none  3. Past outpatient history: [facility] behavioral health therapist Coretta Abla (LCSW)  4. Past suicide history: none  5. Medication history: Venlafaxine 37.5 mg     Social History:   The patient has been  for 58 years to Andres. They have two twin sons and 3 grandchildren.        Family History:  Alcoholism in father  Medical History:   Past Medical History  Past Medical History:    DVT (deep venous thrombosis) ([facility])    Fracture dislocation of left shoulder joint    w/ nerve injury 2009    Injury of nerve, shoulder or arm    w/ dislocation fx of L shoulder 2009    Nerve injury    nerve damage left hand 2009       Past Surgical History  Past Surgical History:   Procedure Laterality Date    Colonoscopy N/A 8/10/2017    Procedure: COLONOSCOPY;  Surgeon: Sony Galvez MD;  Location: [facility] ENDOSCOPY    Tubal ligation         Family History  Family History   Problem Relation Age of Onset    Alcohol and Other Disorders Associated Father     Diabetes Paternal Aunt     Diabetes Maternal Uncle     No Known Problems Son     No Known Problems Son     Breast Cancer Neg     Ovarian Cancer Neg        Social History  Social History     Socioeconomic History    Marital status:    Tobacco Use    Smoking status: Never    Smokeless tobacco: Never   Vaping Use    Vaping status: Never Used   Substance and Sexual  Activity    Alcohol use: Yes     Comment: rarely 3 drinks a year    Drug use: No   Other Topics Concern    Caffeine Concern Yes     Comment: Coffee 2 cups daily     Social Determinants of Health     Food Insecurity: No Food Insecurity (9/27/2024)    Food Insecurity     Food Insecurity: Never true   Transportation Needs: No Transportation Needs (9/27/2024)    Transportation Needs     Lack of Transportation: No   Housing Stability: Low Risk  (9/27/2024)    Housing Stability     Housing Instability: No           Current Medications:  Current Facility-Administered Medications   Medication Dose Route Frequency    potassium chloride 20 mEq/100mL IVPB premix 20 mEq  20 mEq Intravenous Once    metoprolol tartrate (Lopressor) tab 25 mg  25 mg Oral 2x Daily(Beta Blocker)    dextrose 10% infusion (TPN no rate)   Intravenous Continuous PRN    pancrelipase (Lip-Prot-Amyl) (Zenpep) DR particles cap 10,000 Units  10,000 Units Per G Tube PRN    And    sodium bicarbonate tab 325 mg  325 mg Oral PRN    heparin (Porcine) 5000 UNIT/ML injection 5,000 Units  5,000 Units Subcutaneous 2 times per day    ipratropium-albuterol (Duoneb) 0.5-2.5 (3) MG/3ML inhalation solution 3 mL  3 mL Nebulization Q6H PRN    piperacillin-tazobactam (Zosyn) 3.375 g in dextrose 5% 100 mL IVPB-ADDV  3.375 g Intravenous Q8H    pantoprazole (Protonix) 40 mg in sodium chloride 0.9% PF 10 mL IV push  40 mg Intravenous Daily    bisacodyl (Dulcolax) 10 MG rectal suppository 10 mg  10 mg Rectal Q12H PRN    dextrose 5% infusion   Intravenous Continuous    escitalopram (Lexapro) tab 5 mg  5 mg Oral Nightly    clonazePAM (KLONOPIN) disintegrating tab 0.25 mg  0.25 mg Oral BID PRN    lactulose (CHRONULAC) 10 GM/15ML solution 30 g  30 g Oral Once    metoprolol (Lopressor) 5 mg/5mL injection 5 mg  5 mg Intravenous Q4H PRN    [Held by provider] apixaban (Eliquis) tab 5 mg  5 mg Oral BID    traMADol (Ultram) tab 50 mg  50 mg Oral Q8H PRN    ondansetron (Zofran) 4 MG/2ML  injection 4 mg  4 mg Intravenous Q6H PRN    acetaminophen (Tylenol) tab 650 mg  650 mg Oral Q6H PRN    hydrALAzine (Apresoline) 20 mg/mL injection 10 mg  10 mg Intravenous Q4H PRN    alum-mag hydroxide-simethicone (Maalox) 200-200-20 MG/5ML oral suspension 30 mL  30 mL Oral QID PRN    HYDROmorphone (Dilaudid) 1 MG/ML injection 0.5 mg  0.5 mg Intravenous Q2H PRN    Or    HYDROmorphone (Dilaudid) 1 MG/ML injection 1 mg  1 mg Intravenous Q4H PRN    thiamine (Vitamin B1) tab 100 mg  100 mg Oral Daily     Medications Prior to Admission   Medication Sig    venlafaxine 37.5 MG Oral Tab Take 1 tablet (37.5 mg total) by mouth 2 (two) times daily.    apixaban 5 MG Oral Tab Take 1 tablet (5 mg total) by mouth 2 (two) times daily.    Boswellia Gee (BOSWELLIA OR) Take by mouth daily.    Calcium Carbonate-Vitamin D (OSCAL 500/200 D-3 OR) Take 1 tablet by mouth daily.    B Complex-C (SUPER B COMPLEX OR) Take 1 tablet by mouth daily.    Multiple Vitamins-Minerals (CENTRUM SILVER) Oral Tab Take 1 tablet by mouth daily.    traMADol 50 MG Oral Tab Take 1 tablet (50 mg total) by mouth every 8 (eight) hours as needed for Pain. (Patient not taking: Reported on 9/27/2024)       Allergies  No Known Allergies    Review of Systems:   As by Admitting/Attending    Results:   Laboratory Data:  Lab Results   Component Value Date    WBC 4.1 10/02/2024    HGB 10.4 (L) 10/02/2024    HCT 31.2 (L) 10/02/2024    .0 (H) 10/02/2024    CREATSERUM 1.61 (H) 10/02/2024    BUN 46 (H) 10/02/2024     10/02/2024    K 2.9 (LL) 10/02/2024     10/02/2024    CO2 22.0 10/02/2024     (H) 10/02/2024    CA 10.2 10/02/2024    ALB 2.4 (L) 09/30/2024    ALKPHO 84 09/30/2024    TP 5.2 (L) 09/30/2024    AST 36 (H) 09/30/2024    ALT 10 09/30/2024    T4F 0.9 10/02/2024    TSH 1.720 10/02/2024    DDIMER 2.80 (H) 09/28/2024    ESRML 25 10/02/2024    CRP 4.60 (H) 01/31/2024    MG 2.7 (H) 10/02/2024    PHOS 4.2 10/02/2024    CK 93 10/02/2024    B12  582 09/27/2024         Imaging:  XR CHEST AP PORTABLE  (CPT=71045)    Result Date: 10/2/2024  CONCLUSION:   New ovoid opacity at the right lung base.  Could represent round pneumonia or atelectasis.  Recommend attention on follow-up.  Small left pleural effusion with left basilar opacity, unchanged.    Dictated by (CST): Ariane Muro MD on 10/02/2024 at 7:46 AM     Finalized by (CST): Ariane Muro MD on 10/02/2024 at 7:48 AM          XR CHEST AP PORTABLE  (CPT=71045)    Result Date: 10/1/2024  CONCLUSION:  1. Feeding tube tip in proximal gastric antrum. 2. Bibasilar atelectasis and or pneumonia.  Improved aeration right lung base.    Dictated by (CST): Jomar Andre MD on 10/01/2024 at 4:54 PM     Finalized by (CST): Jomar Andre MD on 10/01/2024 at 4:55 PM          US VENOUS DOPPLER LEG BILAT - DIAG IMG (CPT=93970)    Result Date: 9/30/2024  CONCLUSION: Normal examination.     Dictated by (CST): Jordan Dawn MD on 9/30/2024 at 2:30 PM     Finalized by (CST): Jordan Dawn MD on 9/30/2024 at 2:31 PM          XR CHEST AP PORTABLE  (CPT=71045)    Result Date: 9/30/2024  CONCLUSION:  1. Borderline cardiomegaly.  Tortuous aorta. 2. Bibasilar parenchymal abnormality and/or atelectatic changes, worse on the left. 3. Small bilateral effusions, greater on left.  Improved aeration right lung base..    Dictated by (CST): Adonis Issa MD on 9/30/2024 at 8:04 AM     Finalized by (CST): Adonis Issa MD on 9/30/2024 at 8:05 AM           Vital Signs:   Blood pressure 146/69, pulse 88, temperature 98 °F (36.7 °C), temperature source Axillary, resp. rate 22, height 61\", weight 42.2 kg (93 lb 0.6 oz), SpO2 99%.    Mental Status Exam:   Appearance: Stated age female, in hospital gown, laying down in hospital bed. Cachetic.  Patient with NG tube.   at bedside  Psychomotor: psychomotor retardation.  Severely lethargic with soft tone speech.  Orientation: Alert and oriented to person, place, time and  condition.  Gait: Not evaluated.  Attitude/Coorperation: Cooperative and attentive.  Otherwise extremely lethargic.  Behavior: Appropriate. Slowed, smiling. Limited eye contact.   Speech: slowed, soft, incomprehensible mumbling,   Mood: Patient reporting depressed mood improved today.  Affect: Dysphoric congruent with the mood, restricted.  Thought process: slowed, distractible.   Thought content: Patient denies any suicidal or homicidal ideation.  Perceptions: Patient denies any auditory or visual hallucinations.  Concentration: Grossly intact.  Memory: Grossly intact.  Intellect: Average.  Judgment and Insight: Questionable.     Impression:     Major depressive disorder, single episode severe with melancholic features.      Weakness generalized    Hypercalcemia    Anemia    Failure to thrive in adult      Patient is a 79 year old   female with past medical history of HTN, osteopenia, DVT, and anorexia who was admitted to the hospital for Weakness generalized: The patient has been demonstrating generalized weakness for past two weeks,accompanied by anorexia, malaise, and weight loss of 50 pounds over past two years. Patient was unable to walk by herself using her walker.  Patient indicated for psych consult for evaluation and advise.    09/28/2024: Patient demonstrating chronic depression secondary to significant life stressor. Patient demonstrates restricted emotional capacity with predominant depressed affect. Depression manifesting as cachexia in patient secondary to decreased appetite, loss of motivation, and generalized weakness.     09/30/2024: Patient demonstrating improved mood today after son's visit, indicating that source of depression was isolation from son. Patient noted to be smiling and appears more hopeful today.     10/01/2024: Patient demonstrating improved mood however becoming increasing lethargic secondary to medical condition. Patient easily distractible and demonstrating  slowed thought process. Patient unable hold conversation.        10/2/2024: The patient continued demonstrating severe depression with physical deterioration, hopelessness and helplessness otherwise no suicidality and expressed desire to fight her current medical condition.    Discussed risk and benefit, acknowledging the current symptom and severity.  At this point, I would recommend the following approach:      Focus on safety  Focus on education and support.  Focus on insight orientation helping the patient understand diagnosis and treatment plan.  Increase Lexapro to 10 mg nightly.  Discontinue Zyprexa for now due to lethargy.  Start Abilify 1 mg daily.  Utilize Klonopin 0.25 mg twice daily as needed for anxiety.  Processed with patient at length, the initiation of the above psychotropic medications I advised the patient of the risks, benefits, alternatives and potential side effects. The patient consents to administration of the medications and understands the right to refuse medications at any time. The patient verbalized understanding.   Coordinate plan with team.    Orders This Visit:  Orders Placed This Encounter   Procedures    CBC With Differential With Platelet    Basic Metabolic Panel (8)    Urinalysis with Culture Reflex    Basic Metabolic Panel (8)    CBC With Differential With Platelet    Ferritin    Iron And Tibc    Hepatic Function Panel (7)    UA Microscopic only, urine    PTH, Intact    Vitamin D    Vitamin D, 25-Hydroxy    Potassium    LDH    Copper, Serum    Monoclonal Protein Study    Vitamin B12    Folic Acid Serum (Folate)    Haptoglobin    Parathyroid Hormone-related Peptide (PTH-rP) ([employer])    Vitamin D    Bence Ferro protein, 24 Hour Urine    Protein, 24-Hr Urine    Bence Ferro protein, 24 Hour Urine    SERUM MONOCLONAL PROTEIN STUDY    Vitamin D, 1,25 Dihydroxy    Zinc, Plasma Or Serum    Basic Metabolic Panel (8)    CBC With Differential With Platelet    Magnesium     Magnesium    CEA    Carbohydrate Antigen 19-9    -II    Troponin I (High Sensitivity)    BNP (Brain Natriuretic Peptide)    D-Dimer    Magnesium    Basic Metabolic Panel (8)    CBC With Differential With Platelet    Arterial blood gas    Procalcitonin    Cortisol    CBC With Differential With Platelet    Comp Metabolic Panel (14)    Magnesium    Scan slide    RBC Morphology Scan    Arterial blood gas    Sed Rate, Westergren (Automated)    Basic Metabolic Panel (8)    CBC With Differential With Platelet    Magnesium    Scan slide    Phosphorus    Basic Metabolic Panel (8)    CBC With Differential With Platelet    Phosphorus    Magnesium    Scan slide    Potassium    Ammonia, Plasma    Autoimmune Neurology/Comprehensive Paraneoplastic Profile    CK (Creatine Kinase) (Not Creatinine)    Connective Tissue Disease (JUAN MANUEL) Screen, Reflex Specific Antibody    HMGCR Antibody, IgG    Immunofixation (LORENZO)    Myasthenia Gravis Reflex Panel    Myositis Antibody Comprehensive Panel    Rheumatoid Arthritis Factor    Sed Rate, Westergren (Automated)    TSH and Free T4    Occult Blood Stool, Diagnostic    Urine Culture, Routine       Meds This Visit:  Requested Prescriptions      No prescriptions requested or ordered in this encounter       Xavier Herbert MD  10/02/2024    Note to Patient: The 21st Century Cures Act makes medical notes like these available to patients in the interest of transparency. However, be advised this is a medical document. It is intended as peer to peer communication. It is written in medical language and may contain abbreviations or verbiage that are unfamiliar. It may appear blunt or direct. Medical documents are intended to carry relevant information, facts as evident, and the clinical opinion of the practitioner. This note may have been transcribed using a voice dictation system. Voice recognition errors may occur. This should not be taken to alter the content or meaning of this note.

## 2024-10-02 NOTE — PLAN OF CARE
Problem: Patient Centered Care  Goal: Patient preferences are identified and integrated in the patient's plan of care  Description: Interventions:  - What would you like us to know as we care for you? From home with    - Provide timely, complete, and accurate information to patient/family  - Incorporate patient and family knowledge, values, beliefs, and cultural backgrounds into the planning and delivery of care  - Encourage patient/family to participate in care and decision-making at the level they choose  - Honor patient and family perspectives and choices  Outcome: Progressing     Problem: Patient/Family Goals  Goal: Patient/Family Long Term Goal  Description: Patient's Long Term Goal: Feel better    Interventions:  - Monitor vital signs  - Monitor appropriate labs  - Administer medications per order  - Pain management as needed  - Follow MD orders  - Diagnostics per orders  - Update / inform patient and family on plan of care  - Discharge planning    - See additional Care Plan goals for specific interventions  Outcome: Progressing  Goal: Patient/Family Short Term Goal  Description: Patient's Short Term Goal: Improve energy     Interventions:   Monitor vital signs  - Monitor appropriate labs  - Administer medications per order  - Pain management as needed  - Follow MD orders  - Diagnostics per orders  - Update / inform patient and family on plan of care  - Discharge planning      - See additional Care Plan goals for specific interventions  Outcome: Progressing     Problem: SAFETY ADULT - FALL  Goal: Free from fall injury  Description: INTERVENTIONS:  - Assess pt frequently for physical needs  - Identify cognitive and physical deficits and behaviors that affect risk of falls.  - [place] fall precautions as indicated by assessment.  - Educate pt/family on patient safety including physical limitations  - Instruct pt to call for assistance with activity based on assessment  - Modify environment to reduce  risk of injury  - Provide assistive devices as appropriate  - Consider OT/PT consult to assist with strengthening/mobility  - Encourage toileting schedule  Outcome: Progressing     Problem: GENITOURINARY - ADULT  Goal: Absence of urinary retention  Description: INTERVENTIONS:  - Assess patient’s ability to void and empty bladder  - Monitor intake/output and perform bladder scan as needed  - Follow urinary retention protocol/standard of care  - Consider collaborating with pharmacy to review patient's medication profile  - Implement strategies to promote bladder emptying  Outcome: Progressing     Problem: SKIN/TISSUE INTEGRITY - ADULT  Goal: Skin integrity remains intact  Description: INTERVENTIONS  - Assess and document risk factors for pressure ulcer development  - Assess and document skin integrity  - Monitor for areas of redness and/or skin breakdown  - Initiate interventions, skin care algorithm/standards of care as needed  Outcome: Progressing  Goal: Incision(s), wounds(s) or drain site(s) healing without S/S of infection  Description: INTERVENTIONS:  - Assess and document risk factors for pressure ulcer development  - Assess and document skin integrity  - Assess and document dressing/incision, wound bed, drain sites and surrounding tissue  - Implement wound care per orders  - Initiate isolation precautions as appropriate  - Initiate Pressure Ulcer prevention bundle as indicated  Outcome: Progressing     Problem: Impaired Functional Mobility  Goal: Achieve highest/safest level of mobility/gait  Description: Interventions:  - Assess patient's functional ability and stability  - Promote increasing activity/tolerance for mobility and gait  - Educate and engage patient/family in tolerated activity level and precautions  - Recommend use of total lift for transfers  Outcome: Progressing

## 2024-10-02 NOTE — PROGRESS NOTES
10/02/24 1131   VISIT TYPE   SLP Inpatient Visit Type (Documentation Required) Attempted Treatment  (SLP to place pt on hold at this time secondary to 3 consecutive attempts. Pt now with temporary non oral feedings to meet nutrition/hydration/medication needs. Please re consult if secretion management and endurance improves.)   FOLLOW UP/PLAN   Follow Up Needed (Documentation Required) On hold   SLP Follow-up Date 10/02/24     Kalie Tong M.S. CCC-SLP  Speech Language Pathologist  Phone Number Ext. [phone]

## 2024-10-02 NOTE — PROGRESS NOTES
Pulmonary Medicine Inpatient Progress Note                 Subjective:   at bedside.  [place]       ALLERGIES:  No Known Allergies       MEDS:  Home Medications:  Outpatient Medications Marked as Taking for the 9/26/24 encounter (Hospital Encounter)   Medication Sig Dispense Refill    venlafaxine 37.5 MG Oral Tab Take 1 tablet (37.5 mg total) by mouth 2 (two) times daily. 60 tablet 11    apixaban 5 MG Oral Tab Take 1 tablet (5 mg total) by mouth 2 (two) times daily. 168 tablet 0    Boswellia Gee (BOSWELLIA OR) Take by mouth daily.      Calcium Carbonate-Vitamin D (OSCAL 500/200 D-3 OR) Take 1 tablet by mouth daily.      B Complex-C (SUPER B COMPLEX OR) Take 1 tablet by mouth daily.      Multiple Vitamins-Minerals (CENTRUM SILVER) Oral Tab Take 1 tablet by mouth daily.       Scheduled Medication:   potassium chloride  20 mEq Intravenous Once    metoprolol tartrate  25 mg Oral 2x Daily(Beta Blocker)    heparin  5,000 Units Subcutaneous 2 times per day    piperacillin-tazobactam  3.375 g Intravenous Q8H    pantoprazole  40 mg Intravenous Daily    escitalopram  5 mg Oral Nightly    lactulose  30 g Oral Once    [Held by provider] apixaban  5 mg Oral BID    thiamine  100 mg Oral Daily     Continuous Infusing Medication:   dextrose 10%      dextrose Stopped (09/29/24 1229)     PRN Medications:    dextrose 10%    lipase-protease-amylase (Lip-Prot-Amyl) **AND** sodium bicarbonate    ipratropium-albuterol    bisacodyl    clonazePAM    metoprolol    traMADol    ondansetron    acetaminophen    hydrALAzine    alum-mag hydroxide-simethicone    HYDROmorphone **OR** HYDROmorphone       PHYSICAL EXAM:  /69 (BP Location: Right arm)   Pulse 88   Temp 98 °F (36.7 °C) (Axillary)   Resp 22   Ht 5' 1\" (1.549 m)   Wt 93 lb 0.6 oz (42.2 kg)   SpO2 99%   BMI 17.58 kg/m²   CONSTITUTIONAL: awake confused  HEENT: atraumatic normocephalic  MOUTH: mucous membranes are moist. No OP exudates  NECK/THROAT: no JVD. Trachea  midline. No obvious thyromegaly  LUNG: coarse BS throughout. Chest symmetric with respiratory motion  HEART: regular rate and rhythm, no obvious murmers or gallops note  ABD: soft non tender. + bowel sounds. No organomegaly noted  EXT: no clubbing, cyanosis, or edema noted      IMAGES:  CXR 10/2/24  CONCLUSION:   New ovoid opacity at the right lung base.  Could represent round pneumonia or atelectasis.  Recommend attention on follow-up.   Small left pleural effusion with left basilar opacity, unchanged.       LABS:  Recent Labs   Lab 09/30/24  0516 10/01/24  0405 10/02/24  0404   RBC 3.26* 3.73* 3.85   HGB 8.9* 10.1* 10.4*   HCT 29.2* 30.6* 31.2*   MCV 89.6 82.0 81.0   MCH 27.3 27.1 27.0   MCHC 30.5* 33.0 33.3   RDW 17.1* 16.9* 17.0*   NEPRELIM 13.77* 5.93 3.52   WBC 14.6* 6.8 4.1   .0 413.0 481.0*       Recent Labs   Lab 09/26/24 2006 09/27/24  0612 09/30/24  0514 10/01/24  0405 10/02/24  0404   *   < > 73 104* 132*   BUN 23   < > 17 36* 46*   CREATSERUM 0.81   < > 1.05* 1.30* 1.61*   EGFRCR 74   < > 54* 42* 32*   CA 12.2*   < > 10.7* 10.4 10.2   ALB 3.1*  --  2.4*  --   --       < > 131* 136 136   K 3.5   < > 4.1 3.5 2.9*      < > 104 103 104   CO2 30.0   < > 19.0* 23.0 22.0   ALKPHO 90  --  84  --   --    AST 32  --  36*  --   --    ALT 8*  --  10  --   --    BILT 0.6  --  0.9  --   --    TP 5.8  --  5.2*  --   --     < > = values in this interval not displayed.       ASSESSMENT/PLAN:  1.Acute hypoxemic respiratory failure secondary to aspiration pneumonia and atelectasis  -chest CT no PE but with consolidation and atelectasis  -intermittent BIPAP now on 5 LNC supp 02  -on IV zosyn  -continue aspiration precautions  -start nebs, chest PT/VEST    2.FTT  -likely multifactorial    3.Dysphagia  -failed swallow eval. On TF    4.Hx of PMR off steroids    5.Proph:  -DVT: heparin  -GI: PPI    6.Dispo:   -palliative consulted    Will follow       Thank you for the opportunity to care for Julissa  Cori Farrell DO, MPH  Pulmonary Critical Care Medicine  [facility]

## 2024-10-02 NOTE — CONSULTS
formerly [facility]  [address] [place] [address], [address]  [facility] [address]  [phone]            Julissa Ferro Patient Status:  Inpatient    1945 MRN [id number]   Location [facility] 5SW/SE Attending Michael Chamberlain MD   Hosp Day # 5 PCP Jesus Kirkland MD     Date of Admission:  2024  Date of Consult:  10/2/2024  Reason for Consultation:   Generalized weakness, weight loss.    History of Present Illness:   Patient is a 79 year old female who was admitted to the hospital for Weakness generalized:  Patient's been declining since beginning of , eating less and less, some possible dysphagia, but also she was very depressed and she lost her appetite for given foods she likes before.  She lost over 40 pounds.  She was driving until spring 2024, however continued to get progressively weaker and eventually was brought to the hospital in the spring timbre of .  She was also found to have DVTs.      Past Medical History  Past Medical History:    DVT (deep venous thrombosis) (HCC)    Fracture dislocation of left shoulder joint    w/ nerve injury 2009    Injury of nerve, shoulder or arm    w/ dislocation fx of L shoulder 2009    Nerve injury    nerve damage left hand        Past Surgical History  Past Surgical History:   Procedure Laterality Date    Colonoscopy N/A 8/10/2017    Procedure: COLONOSCOPY;  Surgeon: Sony Galvez MD;  Location: [facility] ENDOSCOPY    Tubal ligation         Family History  Family History   Problem Relation Age of Onset    Alcohol and Other Disorders Associated Father     Diabetes Paternal Aunt     Diabetes Maternal Uncle     No Known Problems Son     No Known Problems Son     Breast Cancer Neg     Ovarian Cancer Neg        Social History  Pediatric History   Patient Parents    Not on file     Other Topics Concern     Service Not Asked    Blood Transfusions Not Asked    Caffeine Concern Yes     Comment: Coffee 2 cups daily     Occupational Exposure Not Asked    Hobby Hazards Not Asked    Sleep Concern Not Asked    Stress Concern Not Asked    Weight Concern Not Asked    Special Diet Not Asked    Back Care Not Asked    Exercise Not Asked    Bike Helmet Not Asked    Seat Belt Not Asked    Self-Exams Not Asked   Social History Narrative    Not on file           Current Medications:  Current Facility-Administered Medications   Medication Dose Route Frequency    potassium chloride 40 mEq in 250mL sodium chloride 0.9% IVPB premix  40 mEq Intravenous Once    Followed by    potassium chloride 20 mEq/100mL IVPB premix 20 mEq  20 mEq Intravenous Once    metoprolol tartrate (Lopressor) tab 25 mg  25 mg Oral 2x Daily(Beta Blocker)    dextrose 10% infusion (TPN no rate)   Intravenous Continuous PRN    pancrelipase (Lip-Prot-Amyl) (Zenpep) DR particles cap 10,000 Units  10,000 Units Per G Tube PRN    And    sodium bicarbonate tab 325 mg  325 mg Oral PRN    heparin (Porcine) 5000 UNIT/ML injection 5,000 Units  5,000 Units Subcutaneous 2 times per day    ipratropium-albuterol (Duoneb) 0.5-2.5 (3) MG/3ML inhalation solution 3 mL  3 mL Nebulization Q6H PRN    piperacillin-tazobactam (Zosyn) 3.375 g in dextrose 5% 100 mL IVPB-ADDV  3.375 g Intravenous Q8H    pantoprazole (Protonix) 40 mg in sodium chloride 0.9% PF 10 mL IV push  40 mg Intravenous Daily    bisacodyl (Dulcolax) 10 MG rectal suppository 10 mg  10 mg Rectal Q12H PRN    dextrose 5% infusion   Intravenous Continuous    escitalopram (Lexapro) tab 5 mg  5 mg Oral Nightly    clonazePAM (KLONOPIN) disintegrating tab 0.25 mg  0.25 mg Oral BID PRN    lactulose (CHRONULAC) 10 GM/15ML solution 30 g  30 g Oral Once    metoprolol (Lopressor) 5 mg/5mL injection 5 mg  5 mg Intravenous Q4H PRN    [Held by provider] apixaban (Eliquis) tab 5 mg  5 mg Oral BID    traMADol (Ultram) tab 50 mg  50 mg Oral Q8H PRN    ondansetron (Zofran) 4 MG/2ML injection 4 mg  4 mg Intravenous Q6H PRN    acetaminophen (Tylenol) tab  650 mg  650 mg Oral Q6H PRN    hydrALAzine (Apresoline) 20 mg/mL injection 10 mg  10 mg Intravenous Q4H PRN    alum-mag hydroxide-simethicone (Maalox) 200-200-20 MG/5ML oral suspension 30 mL  30 mL Oral QID PRN    HYDROmorphone (Dilaudid) 1 MG/ML injection 0.5 mg  0.5 mg Intravenous Q2H PRN    Or    HYDROmorphone (Dilaudid) 1 MG/ML injection 1 mg  1 mg Intravenous Q4H PRN    thiamine (Vitamin B1) tab 100 mg  100 mg Oral Daily     Medications Prior to Admission   Medication Sig    venlafaxine 37.5 MG Oral Tab Take 1 tablet (37.5 mg total) by mouth 2 (two) times daily.    apixaban 5 MG Oral Tab Take 1 tablet (5 mg total) by mouth 2 (two) times daily.    Boswellia Gee (BOSWELLIA OR) Take by mouth daily.    Calcium Carbonate-Vitamin D (OSCAL 500/200 D-3 OR) Take 1 tablet by mouth daily.    B Complex-C (SUPER B COMPLEX OR) Take 1 tablet by mouth daily.    Multiple Vitamins-Minerals (CENTRUM SILVER) Oral Tab Take 1 tablet by mouth daily.    traMADol 50 MG Oral Tab Take 1 tablet (50 mg total) by mouth every 8 (eight) hours as needed for Pain. (Patient not taking: Reported on 9/27/2024)       Allergies  No Known Allergies    Review of Systems:   As in HPI, the rest of the 14 system review was done and was negative    Physical Exam:     Vitals:    10/02/24 0300 10/02/24 0400 10/02/24 0447 10/02/24 0500   BP: 141/86 142/82 150/76    Pulse: 106 103 106    Resp: (!) 32 (!) 27 24    Temp:  98 °F (36.7 °C) 97.4 °F (36.3 °C)    TempSrc:  Temporal Axillary    SpO2: 97% 98% 100% 97%   Weight:       Height:           General: No apparent distress, well nourished, well groomed.  Head- Normocephalic, atraumatic  Eyes- No redness or swelling  ENT- Hearing intake, smell preserved, normal glutition  Neck- No masses or adenopathy  Cv: pulses were palpable and normal, no cyanosis or edema     Neurological:     Mental Status- Alert unable to assess fully due to significant hypophonia.  She did follow some simple commands.  But it was  difficult for her to even move her neck.    Cranial Nerves:    III, IV, VI- EOM intact, MADISYN  V. Facial sensation intact  VII. Face symmetric, no facial weakness  VIII. Hearing intact.  Patient was not able to show me her tongue, she tried to open her mouth but had difficulty with fully opening her mouth and sticking her tongue out.      Motor Exam:  Muscle tone increased in both upper and lower extremities  Significant mount of atrophy of all muscle groups, but no clear fasciculations.  Her skin was quite thick.  Possibly minimizing visualization of fasciculations  Strength- upper extremities 1 or 2 out of 5 mostly able to bend her elbows but not move her shoulders                  - lower  extremities 1 out of 5, minimal movements of the lower extremities    Sensory Exam:  Light touch sensation- intact in all 4 extremities    Deep Tendon Reflexes:  Biceps 2+ bilateral symmetric  Triceps 2+ bilateral symmetric  Brachioradialis 2 + bilateral symmetric  Patellar 3+ with cross adduction  Ankle jerk 2+ bilateral symmetric    No clonus  Bilateral Babinski sign    Results:     Laboratory Data:  Lab Results   Component Value Date    WBC 4.1 10/02/2024    HGB 10.4 (L) 10/02/2024    HCT 31.2 (L) 10/02/2024    .0 (H) 10/02/2024    CREATSERUM 1.61 (H) 10/02/2024    BUN 46 (H) 10/02/2024     10/02/2024    K 2.9 (LL) 10/02/2024     10/02/2024    CO2 22.0 10/02/2024     (H) 10/02/2024    CA 10.2 10/02/2024    ALB 2.4 (L) 09/30/2024    ALKPHO 84 09/30/2024    TP 5.2 (L) 09/30/2024    AST 36 (H) 09/30/2024    ALT 10 09/30/2024    T4F 1.2 08/13/2024    TSH 4.130 08/13/2024    DDIMER 2.80 (H) 09/28/2024    ESRML 41 (H) 10/01/2024    CRP 4.60 (H) 01/31/2024    MG 2.7 (H) 10/02/2024    PHOS 4.2 10/02/2024    B12 582 09/27/2024         Imaging:    XR CHEST AP PORTABLE  (CPT=71045)    Result Date: 10/2/2024  CONCLUSION:   New ovoid opacity at the right lung base.  Could represent round pneumonia or  atelectasis.  Recommend attention on follow-up.  Small left pleural effusion with left basilar opacity, unchanged.    Dictated by (CST): Ariane Muro MD on 10/02/2024 at 7:46 AM     Finalized by (CST): Ariane Muro MD on 10/02/2024 at 7:48 AM          XR CHEST AP PORTABLE  (CPT=71045)    Result Date: 10/1/2024  CONCLUSION:  1. Feeding tube tip in proximal gastric antrum. 2. Bibasilar atelectasis and or pneumonia.  Improved aeration right lung base.    Dictated by (CST): Jomar Andre MD on 10/01/2024 at 4:54 PM     Finalized by (CST): Jomar Andre MD on 10/01/2024 at 4:55 PM          US VENOUS DOPPLER LEG BILAT - DIAG IMG (CPT=93970)    Result Date: 9/30/2024  CONCLUSION: Normal examination.     Dictated by (CST): Jordan Dawn MD on 9/30/2024 at 2:30 PM     Finalized by (CST): Jordan Dawn MD on 9/30/2024 at 2:31 PM                 Impression:     Weakness generalized  While possibly due to significant depression and lack of appetite and nutritional deficiencies that she is declining, there was also possibility of motor neuron disease such as ALS.  Will do further workup including muscle enzymes, MRI of the brain will be done as well.  Prognosis remains very poor.    Thank you for allowing me to participate in the care of your patient.    Brad Esposito MD  10/2/2024

## 2024-10-02 NOTE — PLAN OF CARE
Patient transferring to 5SW, report given to Erin MCCLELLAND.     Problem: Patient Centered Care  Goal: Patient preferences are identified and integrated in the patient's plan of care  Description: Interventions:  - What would you like us to know as we care for you? From home with    - Provide timely, complete, and accurate information to patient/family  - Incorporate patient and family knowledge, values, beliefs, and cultural backgrounds into the planning and delivery of care  - Encourage patient/family to participate in care and decision-making at the level they choose  - Honor patient and family perspectives and choices  Outcome: Progressing     Problem: Patient/Family Goals  Goal: Patient/Family Long Term Goal  Description: Patient's Long Term Goal: Feel better    Interventions:  - Monitor vital signs  - Monitor appropriate labs  - Administer medications per order  - Pain management as needed  - Follow MD orders  - Diagnostics per orders  - Update / inform patient and family on plan of care  - Discharge planning    - See additional Care Plan goals for specific interventions  Outcome: Progressing  Goal: Patient/Family Short Term Goal  Description: Patient's Short Term Goal: Improve energy     Interventions:   Monitor vital signs  - Monitor appropriate labs  - Administer medications per order  - Pain management as needed  - Follow MD orders  - Diagnostics per orders  - Update / inform patient and family on plan of care  - Discharge planning      - See additional Care Plan goals for specific interventions  Outcome: Progressing     Problem: SAFETY ADULT - FALL  Goal: Free from fall injury  Description: INTERVENTIONS:  - Assess pt frequently for physical needs  - Identify cognitive and physical deficits and behaviors that affect risk of falls.  - [place] fall precautions as indicated by assessment.  - Educate pt/family on patient safety including physical limitations  - Instruct pt to call for assistance with  activity based on assessment  - Modify environment to reduce risk of injury  - Provide assistive devices as appropriate  - Consider OT/PT consult to assist with strengthening/mobility  - Encourage toileting schedule  Outcome: Progressing     Problem: GENITOURINARY - ADULT  Goal: Absence of urinary retention  Description: INTERVENTIONS:  - Assess patient’s ability to void and empty bladder  - Monitor intake/output and perform bladder scan as needed  - Follow urinary retention protocol/standard of care  - Consider collaborating with pharmacy to review patient's medication profile  - Implement strategies to promote bladder emptying  Outcome: Progressing     Problem: SKIN/TISSUE INTEGRITY - ADULT  Goal: Skin integrity remains intact  Description: INTERVENTIONS  - Assess and document risk factors for pressure ulcer development  - Assess and document skin integrity  - Monitor for areas of redness and/or skin breakdown  - Initiate interventions, skin care algorithm/standards of care as needed  Outcome: Progressing  Goal: Incision(s), wounds(s) or drain site(s) healing without S/S of infection  Description: INTERVENTIONS:  - Assess and document risk factors for pressure ulcer development  - Assess and document skin integrity  - Assess and document dressing/incision, wound bed, drain sites and surrounding tissue  - Implement wound care per orders  - Initiate isolation precautions as appropriate  - Initiate Pressure Ulcer prevention bundle as indicated  Outcome: Progressing     Problem: Impaired Functional Mobility  Goal: Achieve highest/safest level of mobility/gait  Description: Interventions:  - Assess patient's functional ability and stability  - Promote increasing activity/tolerance for mobility and gait  - Educate and engage patient/family in tolerated activity level and precautions    Outcome: Progressing

## 2024-10-03 PROBLEM — J96.91 RESPIRATORY FAILURE WITH HYPOXIA (HCC): Status: ACTIVE | Noted: 2024-01-01

## 2024-10-03 PROBLEM — R06.00 DYSPNEA: Status: ACTIVE | Noted: 2024-01-01

## 2024-10-03 NOTE — PLAN OF CARE
Problem: SAFETY ADULT - FALL  Goal: Free from fall injury  Description: INTERVENTIONS:  - Assess pt frequently for physical needs  - Identify cognitive and physical deficits and behaviors that affect risk of falls.  - [place] fall precautions as indicated by assessment.  - Educate pt/family on patient safety including physical limitations  - Instruct pt to call for assistance with activity based on assessment  - Modify environment to reduce risk of injury  - Provide assistive devices as appropriate  - Consider OT/PT consult to assist with strengthening/mobility  - Encourage toileting schedule  Outcome: Progressing     Problem: GENITOURINARY - ADULT  Goal: Absence of urinary retention  Description: INTERVENTIONS:  - Assess patient’s ability to void and empty bladder  - Monitor intake/output and perform bladder scan as needed  - Follow urinary retention protocol/standard of care  - Consider collaborating with pharmacy to review patient's medication profile  - Implement strategies to promote bladder emptying  Outcome: Progressing     Problem: SKIN/TISSUE INTEGRITY - ADULT  Goal: Skin integrity remains intact  Description: INTERVENTIONS  - Assess and document risk factors for pressure ulcer development  - Assess and document skin integrity  - Monitor for areas of redness and/or skin breakdown  - Initiate interventions, skin care algorithm/standards of care as needed  Outcome: Progressing  Goal: Incision(s), wounds(s) or drain site(s) healing without S/S of infection  Description: INTERVENTIONS:  - Assess and document risk factors for pressure ulcer development  - Assess and document skin integrity  - Assess and document dressing/incision, wound bed, drain sites and surrounding tissue  - Implement wound care per orders  - Initiate isolation precautions as appropriate  - Initiate Pressure Ulcer prevention bundle as indicated  Outcome: Progressing     Problem: Impaired Functional Mobility  Goal: Achieve highest/safest  level of mobility/gait  Description: Interventions:  - Assess patient's functional ability and stability  - Promote increasing activity/tolerance for mobility and gait  - Educate and engage patient/family in tolerated activity level and precautions  Outcome: Not Progressing     Problem: RESPIRATORY - ADULT  Goal: Achieves optimal ventilation and oxygenation  Description: INTERVENTIONS:  - Assess for changes in respiratory status  - Assess for changes in mentation and behavior  - Position to facilitate oxygenation and minimize respiratory effort  - Oxygen supplementation based on oxygen saturation or ABGs  - Provide Smoking Cessation handout, if applicable  - Encourage broncho-pulmonary hygiene including cough, deep breathe, Incentive Spirometry  - Assess the need for suctioning and perform as needed  - Assess and instruct to report SOB or any respiratory difficulty  - Respiratory Therapy support as indicated  - Manage/alleviate anxiety  - Monitor for signs/symptoms of CO2 retention  Outcome: Not Progressing

## 2024-10-03 NOTE — HOSPICE RN NOTE
[facility] RN and liaison met with the patient's spouse to discuss comfort care. Informational meeting complete. Hospice philosophy, Medicare benefit, and levels of care discussed. All questions answered. The spouse would like to proceed with hospice. Working on flipping the chart into hospice currently. The patient is currently eligible for inpatient hospice for respiratory failure/dyspnea and airway secretions. [facility] will follow to support the patient and family.     Sonia Mckeon RN  [facility] RN Liaison  [phone] (After-hours)

## 2024-10-03 NOTE — OCCUPATIONAL THERAPY NOTE
Per chart review, pt transferred to CCU over night due to worsening respiratory status. Pt currently on BiPap at 40%. Per RN, pt not appropriate for therapy at this time. OT treatment deferred. Will follow up and resume therapy when medically appropriate

## 2024-10-03 NOTE — PROGRESS NOTES
[facility]  [address] [place] [address], [address]  [facility] [address]  [phone]            Julissa Ferro Patient Status:  Inpatient    1945 MRN [id number]   Location [facility] 2W/SW Attending Michael Chamberlain MD   Hosp Day # 6 PCP Jesus Kirkland MD     Subjective:  Julissa Ferro is a(n) 79 year old female.    Hospital course to date:     Patient's been declining since beginning of , eating less and less, some possible dysphagia, but also she was very depressed and she lost her appetite for favorite foods.  She lost over 40 pounds.  She was driving until spring 2024, however continued to get progressively weaker and eventually was brought to the hospital in the spring timbre of .  She was also found to have DVTs.  Psychiatrist felt that most of her symptoms were due to severe depression and lack of appetite and losing weight with the nutritional reasons.  However there was also evidence of hyperreflexia and Babinski present bilaterally.  However no fasciculations.  MRI of the brain and cervical spine were not revealing.    By Thursday patient was transferred to the ICU with worsening of breathing function.  She continued to decline.    Current Facility-Administered Medications   Medication Dose Route Frequency    scopolamine (Transderm-Scop) 1 MG/3DAYS patch 1 patch  1 patch Transdermal Q72H    ipratropium-albuterol (Duoneb) 0.5-2.5 (3) MG/3ML inhalation solution 3 mL  3 mL Nebulization 4 times per day    sodium chloride (hypertonic) 3 % nebulizer solution 4 mL  4 mL Nebulization 2 times daily    escitalopram (Lexapro) tab 10 mg  10 mg Oral Nightly    ARIPiprazole (Abilify) tab 1 mg  1 mg Oral Daily    metoprolol tartrate (Lopressor) tab 25 mg  25 mg Oral 2x Daily(Beta Blocker)    dextrose 10% infusion (TPN no rate)   Intravenous Continuous PRN    pancrelipase (Lip-Prot-Amyl) (Zenpep) DR particles cap 10,000 Units  10,000 Units Per G Tube PRN    And     sodium bicarbonate tab 325 mg  325 mg Oral PRN    heparin (Porcine) 5000 UNIT/ML injection 5,000 Units  5,000 Units Subcutaneous 2 times per day    ipratropium-albuterol (Duoneb) 0.5-2.5 (3) MG/3ML inhalation solution 3 mL  3 mL Nebulization Q6H PRN    piperacillin-tazobactam (Zosyn) 3.375 g in dextrose 5% 100 mL IVPB-ADDV  3.375 g Intravenous Q8H    pantoprazole (Protonix) 40 mg in sodium chloride 0.9% PF 10 mL IV push  40 mg Intravenous Daily    bisacodyl (Dulcolax) 10 MG rectal suppository 10 mg  10 mg Rectal Q12H PRN    dextrose 5% infusion   Intravenous Continuous    clonazePAM (KLONOPIN) disintegrating tab 0.25 mg  0.25 mg Oral BID PRN    lactulose (CHRONULAC) 10 GM/15ML solution 30 g  30 g Oral Once    metoprolol (Lopressor) 5 mg/5mL injection 5 mg  5 mg Intravenous Q4H PRN    [Held by provider] apixaban (Eliquis) tab 5 mg  5 mg Oral BID    traMADol (Ultram) tab 50 mg  50 mg Oral Q8H PRN    ondansetron (Zofran) 4 MG/2ML injection 4 mg  4 mg Intravenous Q6H PRN    acetaminophen (Tylenol) tab 650 mg  650 mg Oral Q6H PRN    hydrALAzine (Apresoline) 20 mg/mL injection 10 mg  10 mg Intravenous Q4H PRN    alum-mag hydroxide-simethicone (Maalox) 200-200-20 MG/5ML oral suspension 30 mL  30 mL Oral QID PRN    HYDROmorphone (Dilaudid) 1 MG/ML injection 0.5 mg  0.5 mg Intravenous Q2H PRN    Or    HYDROmorphone (Dilaudid) 1 MG/ML injection 1 mg  1 mg Intravenous Q4H PRN    thiamine (Vitamin B1) tab 100 mg  100 mg Oral Daily       Objective:  Blood pressure 131/77, pulse 103, temperature 97 °F (36.1 °C), temperature source Temporal, resp. rate (!) 33, height 61\", weight 93 lb 0.6 oz (42.2 kg), SpO2 94%.    Physical Exam:  Vitals:    10/03/24 0114 10/03/24 0400 10/03/24 0500 10/03/24 0600   BP: 142/73  104/79 131/77   Pulse: 98 103 93 103   Resp: 22 (!) 27 (!) 27 (!) 33   Temp:  (!) 95.9 °F (35.5 °C) 97 °F (36.1 °C)    TempSrc:  Temporal Temporal    SpO2: 93% 94% 97% 94%   Weight:       Height:           General: No  apparent distress, well nourished, well groomed.  Head- Normocephalic, atraumatic  Eyes- No redness or swelling  Neck- No masses or adenopathy  CV: pulses were palpable and normal, no cyanosis or edema     Mental Status- Alert unable to assess fully due to significant hypophonia.  She did follow some simple commands.  But it was difficult for her to even move her neck.     Cranial Nerves:     III, IV, VI- EOM intact, MADISYN  V. Facial sensation intact  VII. Face symmetric, no facial weakness  VIII. Hearing intact.  Patient was not able to show me her tongue, she tried to open her mouth but had difficulty with fully opening her mouth and sticking her tongue out.        Motor Exam:  Muscle tone increased in both upper and lower extremities  Significant mount of atrophy of all muscle groups, but no clear fasciculations.  Her skin was quite thick.  Possibly minimizing visualization of fasciculations  Strength- upper extremities 1 or 2 out of 5 mostly able to bend her elbows but not move her shoulders                  - lower  extremities 1 out of 5, minimal movements of the lower extremities     Sensory Exam:  Light touch sensation- intact in all 4 extremities     Deep Tendon Reflexes:  Biceps 2+ bilateral symmetric  Triceps 2+ bilateral symmetric  Brachioradialis 2 + bilateral symmetric  Patellar 3+ with cross adduction  Ankle jerk 2+ bilateral symmetric     No clonus  Bilateral Babinski sign      Lab Results   Component Value Date    WBC 4.1 10/02/2024    HGB 10.4 (L) 10/02/2024    HCT 31.2 (L) 10/02/2024    .0 (H) 10/02/2024    CREATSERUM 1.61 (H) 10/02/2024    BUN 46 (H) 10/02/2024     10/02/2024    K 3.9 10/02/2024     10/02/2024    CO2 22.0 10/02/2024     (H) 10/02/2024    CA 10.2 10/02/2024    ALB 2.4 (L) 09/30/2024    ALKPHO 84 09/30/2024    BILT 0.9 09/30/2024    TP 5.2 (L) 09/30/2024    AST 36 (H) 09/30/2024    ALT 10 09/30/2024    T4F 0.9 10/02/2024    TSH 1.720 10/02/2024    TEOFILO  2.80 (H) 09/28/2024    ESRML 25 10/02/2024    CRP 4.60 (H) 01/31/2024    MG 2.7 (H) 10/02/2024    PHOS 4.2 10/02/2024    CK 93 10/02/2024    B12 582 09/27/2024       XR CHEST AP PORTABLE  (CPT=71045)    Result Date: 10/3/2024  CONCLUSION:  1. There has been removal of the previously seen large-bore Dobbhoff tube.  2. Moderate left larger than right pleural effusions and associated basilar atelectasis, with or without superimposed pneumonia.  3. Redemonstration of a lobulated opacity at the right lung base. Continued surveillance is advised.   A preliminary report was issued by the [facility] teleradiology service. There are no major discrepancies.   Dictated by (CST): Alfredo Deal MD on 10/03/2024 at 7:41 AM     Finalized by (CST): Alfredo Deal MD on 10/03/2024 at 7:44 AM          MRI SPINE CERVICAL (CPT=72141)    Result Date: 10/2/2024  CONCLUSION:  1. Normal cervical and upper thoracic cord through T3. 2. Multilevel degenerative disc disease/spondylosis.  Disc osteophyte complex at C4-5 results in mild ventral cord contact and mild central narrowing.  No moderate or high-grade central narrowing. 3. Image degradation by patient related motion artifact.    Dictated by (CST): Jomar Andre MD on 10/02/2024 at 5:13 PM     Finalized by (CST): Jomar Andre MD on 10/02/2024 at 5:23 PM          MRI BRAIN (CPT=70551)    Result Date: 10/2/2024  CONCLUSION:   1. No acute intracranial abnormality. 2. Moderate global parenchymal volume loss. 3. 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  4. Lesser incidental findings described above.    Dictated by (CST): Jay Jay Golden MD on 10/02/2024 at 2:58 PM     Finalized by (CST): Jay Jay Golden MD on 10/02/2024 at 3:06 PM          XR CHEST AP PORTABLE  (CPT=71045)    Result Date: 10/2/2024  CONCLUSION:   New ovoid opacity at the right lung base.  Could represent round pneumonia  or atelectasis.  Recommend attention on follow-up.  Small left pleural effusion with left basilar opacity, unchanged.    Dictated by (CST): Ariane Muro MD on 10/02/2024 at 7:46 AM     Finalized by (CST): Ariane Muro MD on 10/02/2024 at 7:48 AM          XR CHEST AP PORTABLE  (CPT=71045)    Result Date: 10/1/2024  CONCLUSION:  1. Feeding tube tip in proximal gastric antrum. 2. Bibasilar atelectasis and or pneumonia.  Improved aeration right lung base.    Dictated by (CST): Jomar Andre MD on 10/01/2024 at 4:54 PM     Finalized by (CST): Jomar Andre MD on 10/01/2024 at 4:55 PM             MRI of the brain was independently viewed, some atrophy but no obvious significant etiology for her symptoms.    Assessment:  Patient Active Problem List   Diagnosis    Nonspecific abnormal serum enzyme levels    Hypercholesterolemia    Injury to nerves    Osteopenia    Elevated blood pressure    Encounter for annual wellness exam in Medicare patient    Postural dizziness with presyncope    Abnormal EKG    Closed fracture of right shoulder    Essential hypertension    Pain in right knee    Sacroiliitis (HCC)    Left lower quadrant abdominal pain    Pelvic pressure in female    Pain in joints    Localized swelling of both lower legs    Myalgia    Weakness    Iron deficiency anemia due to chronic blood loss    Polymyalgia rheumatica (formerly [facility])    Allergic reaction    DVT (deep venous thrombosis) (formerly [facility])    Nodule of right lung    Weakness generalized    Hypercalcemia    Anemia    Failure to thrive in adult    Severe major depression, single episode, without psychotic features (formerly [facility])    Palliative care by specialist    Dysphagia    Poor appetite    Goals of care, counseling/discussion     Weakness generalized  While possibly the severe weakness and muscle weight loss is due to significant depression and lack of appetite and nutritional deficiencies that she is declining, there was also possibility of motor neuron disease such as  ALS.    MRI of the brain and cervical spine was not revealing.  CPK was normal.    Her prognosis remains extremely poor given her failure to thrive or even possible combination of ALS.    Brad Esposito MD  10/3/2024  8:17 AM

## 2024-10-03 NOTE — PROGRESS NOTES
Hospice referral received from Dr. Dawson. [facility] will follow up with family to set up informational meeting.     Emilia eVnces  Residential Liaison  [id number]

## 2024-10-03 NOTE — PROGRESS NOTES
This RN attempted to call spouse to notify of transfer to room 226. Spouse (Andres Ferro) was left a voicemail as he did not answer. Message left notifying of patient transfer with all belongings at bedside.

## 2024-10-03 NOTE — PROGRESS NOTES
CHARITY and code status discussion presented between primary, palliative, and patient's spouse. The decision was made of DNAR/comfort care. BIPAP in place, per the request of the patient's spouse for the remaining family to arrive. Patient is able to follow commands, and speech is shown as moaning/groaning. The requests of a  visit has been placed. [facility] consulted and will be following. Frequent nurse rounding to administer comfort measures.

## 2024-10-03 NOTE — HOSPICE RN NOTE
[facility] RN admitted the patient to general inpatient hospice per Dr. Dawson, Dr. Chamberlain and Dr. Fuller. Patient is eligible for general inpatient hospice care for symptom management of respiratory failure/dyspnea, and airway secretions requiring frequent nursing assessments and interventions including titration of IV medications.    Chart flipped.    Comfort order set placed. Medications: Patient will be started on Dilaudid gtt 0.2mg/hr, with bolus from bag as needed. Ativan IVP panel, Stacie IV scheduled for airway secretions.     Regular diet with pleasure feeding.    Aspiration precautions in place.    PPS: 10%    POC discussed with spouse and Melvi MCCLELLAND. All are in agreement. [facility] to follow daily to support the patient and family.     Sonia Mckeon, RN  [facility] RN Liaison  [phone] (After-hours)

## 2024-10-03 NOTE — PROGRESS NOTES
Pulmonary Medicine Inpatient Progress Note                 Subjective:  Transferred to ICU overnight after rapid called.   On BIPAP this am. Wakes up and mumbles through the BIPAP.  Receiving chest pt this am.    at bedside.        ALLERGIES:  No Known Allergies       MEDS:  Home Medications:  Outpatient Medications Marked as Taking for the 9/26/24 encounter (Hospital Encounter)   Medication Sig Dispense Refill    venlafaxine 37.5 MG Oral Tab Take 1 tablet (37.5 mg total) by mouth 2 (two) times daily. 60 tablet 11    apixaban 5 MG Oral Tab Take 1 tablet (5 mg total) by mouth 2 (two) times daily. 168 tablet 0    Boswellia Gee (BOSWELLIA OR) Take by mouth daily.      Calcium Carbonate-Vitamin D (OSCAL 500/200 D-3 OR) Take 1 tablet by mouth daily.      B Complex-C (SUPER B COMPLEX OR) Take 1 tablet by mouth daily.      Multiple Vitamins-Minerals (CENTRUM SILVER) Oral Tab Take 1 tablet by mouth daily.       Scheduled Medication:   scopolamine  1 patch Transdermal Q72H    ipratropium-albuterol  3 mL Nebulization 4 times per day    sodium chloride (hypertonic)  4 mL Nebulization 2 times daily    escitalopram  10 mg Oral Nightly    ARIPiprazole  1 mg Oral Daily    metoprolol tartrate  25 mg Oral 2x Daily(Beta Blocker)    heparin  5,000 Units Subcutaneous 2 times per day    piperacillin-tazobactam  3.375 g Intravenous Q8H    pantoprazole  40 mg Intravenous Daily    lactulose  30 g Oral Once    [Held by provider] apixaban  5 mg Oral BID    thiamine  100 mg Oral Daily     Continuous Infusing Medication:   dextrose 10% 45 mL/hr at 10/03/24 0430    dextrose Stopped (10/03/24 0430)     PRN Medications:    dextrose 10%    lipase-protease-amylase (Lip-Prot-Amyl) **AND** sodium bicarbonate    ipratropium-albuterol    bisacodyl    clonazePAM    metoprolol    traMADol    ondansetron    acetaminophen    hydrALAzine    alum-mag hydroxide-simethicone    HYDROmorphone **OR** HYDROmorphone       PHYSICAL EXAM:  /77    Pulse 103   Temp 97 °F (36.1 °C) (Temporal)   Resp (!) 33   Ht 5' 1\" (1.549 m)   Wt 93 lb 0.6 oz (42.2 kg)   SpO2 94%   BMI 17.58 kg/m²   CONSTITUTIONAL:wakes up, overall frail and weak   HEENT: atraumatic normocephalic  MOUTH: mucous membranes are moist. No OP exudates  NECK/THROAT: no JVD. Trachea midline. No obvious thyromegaly  LUNG: clear upper but diminished bases. Chest symmetric with respiratory motion  HEART: regular rate and rhythm, no obvious murmers or gallops note  ABD: soft non tender. + bowel sounds. No organomegaly noted  EXT: no clubbing, cyanosis, or edema noted      IMAGES:  CXR 10/3/24:  To me appears not sign change from previous. Has a RLL opacity. Small left pleural effusion  Official read:  CONCLUSION:   1. There has been removal of the previously seen large-bore Dobbhoff tube.   2. Moderate left larger than right pleural effusions and associated basilar atelectasis, with or without superimposed pneumonia.   3. Redemonstration of a lobulated opacity at the right lung base. Continued surveillance is advised.       CXR 10/2/24  CONCLUSION:   New ovoid opacity at the right lung base.  Could represent round pneumonia or atelectasis.  Recommend attention on follow-up.   Small left pleural effusion with left basilar opacity, unchanged.       LABS:  Recent Labs   Lab 09/30/24  0516 10/01/24  0405 10/02/24  0404   RBC 3.26* 3.73* 3.85   HGB 8.9* 10.1* 10.4*   HCT 29.2* 30.6* 31.2*   MCV 89.6 82.0 81.0   MCH 27.3 27.1 27.0   MCHC 30.5* 33.0 33.3   RDW 17.1* 16.9* 17.0*   NEPRELIM 13.77* 5.93 3.52   WBC 14.6* 6.8 4.1   .0 413.0 481.0*       Recent Labs   Lab 09/26/24  2006 09/27/24  0612 09/27/24  1235 09/27/24  1435 09/30/24  0514 10/01/24  0405 10/02/24  0404 10/02/24  1604   *   < >  --    < > 73 104* 132*  --    BUN 23   < >  --    < > 17 36* 46*  --    CREATSERUM 0.81   < >  --    < > 1.05* 1.30* 1.61*  --    EGFRCR 74   < >  --    < > 54* 42* 32*  --    CA 12.2*   < >  --    <  > 10.7* 10.4 10.2  --    ALB 3.1*  --  2.41*  --  2.4*  --   --   --       < >  --    < > 131* 136 136  --    K 3.5   < >  --    < > 4.1 3.5 2.9* 3.9      < >  --    < > 104 103 104  --    CO2 30.0   < >  --    < > 19.0* 23.0 22.0  --    ALKPHO 90  --   --   --  84  --   --   --    AST 32  --   --   --  36*  --   --   --    ALT 8*  --   --   --  10  --   --   --    BILT 0.6  --   --   --  0.9  --   --   --    TP 5.8  --  5.1*  --  5.2*  --   --   --     < > = values in this interval not displayed.       ASSESSMENT/PLAN:  1.Acute hypoxemic respiratory failure secondary to aspiration pneumonia and atelectasis and mucus plugging  -chest CT no PE but with consolidation and atelectasis  -intermittent BIPAP and 5 LNC supp 02  -overnight rapid called and pt transferred to the ICU. May have mucus plugged. Try to wean off BIPAP this am. CXR appears not significantly changed from previous.   -left pleural effusion appears small   -on IV zosyn  -continue aspiration precautions  -started nebs, chest PT/VEST    2.FTT  -likely multifactorial  -neurology consulted and s/p MRI    3.Dysphagia  -failed swallow eval. On TF    4.Hx of PMR off steroids    5.OCTAVIO  -on IVF    6.Proph:  -DVT: heparin  -GI: PPI    7.Dispo:   -palliative consulted    Critical care time 30 min independent of procedures.     Will follow       Thank you for the opportunity to care for Waltonkaris Ferro.     AJKUB Farrell DO, MPH  Pulmonary Critical Care Medicine  [facility]

## 2024-10-03 NOTE — PLAN OF CARE
Patient VSS, no acute changes during shift. Frequent rounding, no needs at this time. Call light always in reach and safety precautions in place.     Problem: Patient Centered Care  Goal: Patient preferences are identified and integrated in the patient's plan of care  Description: Interventions:  - What would you like us to know as we care for you? From home with    - Provide timely, complete, and accurate information to patient/family  - Incorporate patient and family knowledge, values, beliefs, and cultural backgrounds into the planning and delivery of care  - Encourage patient/family to participate in care and decision-making at the level they choose  - Honor patient and family perspectives and choices  Outcome: Progressing     Problem: Patient/Family Goals  Goal: Patient/Family Long Term Goal  Description: Patient's Long Term Goal: Feel better    Interventions:  - Monitor vital signs  - Monitor appropriate labs  - Administer medications per order  - Pain management as needed  - Follow MD orders  - Diagnostics per orders  - Update / inform patient and family on plan of care  - Discharge planning    - See additional Care Plan goals for specific interventions  Outcome: Progressing  Goal: Patient/Family Short Term Goal  Description: Patient's Short Term Goal: Improve energy     Interventions:   Monitor vital signs  - Monitor appropriate labs  - Administer medications per order  - Pain management as needed  - Follow MD orders  - Diagnostics per orders  - Update / inform patient and family on plan of care  - Discharge planning      - See additional Care Plan goals for specific interventions  Outcome: Progressing     Problem: SAFETY ADULT - FALL  Goal: Free from fall injury  Description: INTERVENTIONS:  - Assess pt frequently for physical needs  - Identify cognitive and physical deficits and behaviors that affect risk of falls.  - [place] fall precautions as indicated by assessment.  - Educate pt/family on  patient safety including physical limitations  - Instruct pt to call for assistance with activity based on assessment  - Modify environment to reduce risk of injury  - Provide assistive devices as appropriate  - Consider OT/PT consult to assist with strengthening/mobility  - Encourage toileting schedule  Outcome: Progressing     Problem: GENITOURINARY - ADULT  Goal: Absence of urinary retention  Description: INTERVENTIONS:  - Assess patient’s ability to void and empty bladder  - Monitor intake/output and perform bladder scan as needed  - Follow urinary retention protocol/standard of care  - Consider collaborating with pharmacy to review patient's medication profile  - Implement strategies to promote bladder emptying  Outcome: Progressing     Problem: SKIN/TISSUE INTEGRITY - ADULT  Goal: Skin integrity remains intact  Description: INTERVENTIONS  - Assess and document risk factors for pressure ulcer development  - Assess and document skin integrity  - Monitor for areas of redness and/or skin breakdown  - Initiate interventions, skin care algorithm/standards of care as needed  Outcome: Progressing  Goal: Incision(s), wounds(s) or drain site(s) healing without S/S of infection  Description: INTERVENTIONS:  - Assess and document risk factors for pressure ulcer development  - Assess and document skin integrity  - Assess and document dressing/incision, wound bed, drain sites and surrounding tissue  - Implement wound care per orders  - Initiate isolation precautions as appropriate  - Initiate Pressure Ulcer prevention bundle as indicated  Outcome: Progressing     Problem: Impaired Functional Mobility  Goal: Achieve highest/safest level of mobility/gait  Description: Interventions:  - Assess patient's functional ability and stability  - Promote increasing activity/tolerance for mobility and gait  - Educate and engage patient/family in tolerated activity level and precautions  - Recommend use of lift for transfers and  ambulation  Outcome: Progressing

## 2024-10-03 NOTE — SPIRITUAL CARE NOTE
Spiritual Care Visit Note    Patient Name: Julissa Ferro Date of Spiritual Care Visit: 10/03/24   : 1945 Primary Dx: <principal problem not specified>       Referred By: Referral From: ;Nurse    Spiritual Care Taxonomy:    Intended Effects: Aligning care plan with patient's values    Methods: Accompany someone in their spiritual/Oriental orthodox practice outside your conrad tradition;Assist with spiritual/Oriental orthodox practices    Interventions: Acknowledge current situation;Acknowledge response to difficult experience;Facilitate preparing for end of life    Visit Type/Summary:     - Spiritual Care: Responded to a request for spiritual care and assessed the patient for spiritual care needs. Responded to a request via the on call phone Consulted with RN prior to visit. Family members are requesting urgent anointing for patient. Writer called  on call Father Lamberto Nevarez and left a message.  remains available as needed for follow up.    Spiritual Care support can be requested via an [facility] consult. For urgent/immediate needs, please contact the On Call  at: [place]: alina [phone]    Dae [phone]

## 2024-10-03 NOTE — PROGRESS NOTES
Patient is a 79 year old   female with past medical history of HTN, osteopenia, DVT, and anorexia who was admitted to the hospital for Weakness generalized. The patient has been demonstrating generalized weakness for past two weeks,accompanied by anorexia, malaise, and weight loss of 50 pounds over past two years. Patient was unable to walk by herself using her walker.  Medical workup was negative for any malignancy.    Consult Duration   The patient seen for over 50-minute, follow-up evaluation, over 50% counseling and coordinating care addressing  depression.  Record reviewed, communication with attending, communication with RN and patient seen face to face evaluation.    History of Present Illness:   Patient transferred back to ICU due to respiratory failure. Patient currently on continuous BiPAP.    Patient received Klonopin .25mg overnight.     The patient laying down in her bed and  at bedside.    Patient increasingly lethargic. Participation in interview limited due to continuous BiPAP for respiratory support.    The patient continued demonstrating deterioration physically and having difficulty with her energy, appetite, motivation and concentration.    The patient has not been demonstrating significant cognitive impairment but due to the extreme fatigue and lethargy she has been in and out with some orientation distortion.    Her  at bedside being very supportive and holding her hand all the time.  Has been stable from indicating some intervention.  He reporting \"I do not know that she is bad\".       The patient is not demonstrating any kei or psychosis  The patient denies auditory or visual hallucinations  The patient denies suicidal or homicidal ideation.     Patient restarted Lexapro last night after the NG tube otherwise she has not been her medication for 2 days prior to that.      The patient has been demonstrating feelings of hopelessness, helplessness, worthlessness,  low mood, low energy, decreased motivation with increased anxiety, worry, ruminations with impairment in sleep. The patient denies any suicidal ideations. The patient is agreeable to medications changes and to follow up with outpatient psychiatry providers.      Past Psychiatric/Medication History:  1. Prior diagnoses: none  2. Past psychiatric inpatient: none  3. Past outpatient history: [facility] behavioral health therapist Coretta Alba (LCSW)  4. Past suicide history: none  5. Medication history: Venlafaxine 37.5 mg     Social History:   The patient has been  for 58 years to Andres. They have two twin sons and 3 grandchildren.        Family History:  Alcoholism in father  Medical History:   Past Medical History  Past Medical History:    DVT (deep venous thrombosis) ([facility])    Fracture dislocation of left shoulder joint    w/ nerve injury 2009    Injury of nerve, shoulder or arm    w/ dislocation fx of L shoulder 2009    Nerve injury    nerve damage left hand 2009       Past Surgical History  Past Surgical History:   Procedure Laterality Date    Colonoscopy N/A 8/10/2017    Procedure: COLONOSCOPY;  Surgeon: Sony Galvez MD;  Location: [facility] ENDOSCOPY    Tubal ligation         Family History  Family History   Problem Relation Age of Onset    Alcohol and Other Disorders Associated Father     Diabetes Paternal Aunt     Diabetes Maternal Uncle     No Known Problems Son     No Known Problems Son     Breast Cancer Neg     Ovarian Cancer Neg        Social History  Social History     Socioeconomic History    Marital status:    Tobacco Use    Smoking status: Never    Smokeless tobacco: Never   Vaping Use    Vaping status: Never Used   Substance and Sexual Activity    Alcohol use: Yes     Comment: rarely 3 drinks a year    Drug use: No   Other Topics Concern    Caffeine Concern Yes     Comment: Coffee 2 cups daily     Social Determinants of Health     Food Insecurity: No Food Insecurity  (9/27/2024)    Food Insecurity     Food Insecurity: Never true   Transportation Needs: No Transportation Needs (9/27/2024)    Transportation Needs     Lack of Transportation: No   Housing Stability: Low Risk  (9/27/2024)    Housing Stability     Housing Instability: No           Current Medications:  Current Facility-Administered Medications   Medication Dose Route Frequency    scopolamine (Transderm-Scop) 1 MG/3DAYS patch 1 patch  1 patch Transdermal Q72H    ipratropium-albuterol (Duoneb) 0.5-2.5 (3) MG/3ML inhalation solution 3 mL  3 mL Nebulization 4 times per day    sodium chloride (hypertonic) 3 % nebulizer solution 4 mL  4 mL Nebulization 2 times daily    escitalopram (Lexapro) tab 10 mg  10 mg Oral Nightly    ARIPiprazole (Abilify) tab 1 mg  1 mg Oral Daily    metoprolol tartrate (Lopressor) tab 25 mg  25 mg Oral 2x Daily(Beta Blocker)    dextrose 10% infusion (TPN no rate)   Intravenous Continuous PRN    pancrelipase (Lip-Prot-Amyl) (Zenpep) DR particles cap 10,000 Units  10,000 Units Per G Tube PRN    And    sodium bicarbonate tab 325 mg  325 mg Oral PRN    heparin (Porcine) 5000 UNIT/ML injection 5,000 Units  5,000 Units Subcutaneous 2 times per day    ipratropium-albuterol (Duoneb) 0.5-2.5 (3) MG/3ML inhalation solution 3 mL  3 mL Nebulization Q6H PRN    piperacillin-tazobactam (Zosyn) 3.375 g in dextrose 5% 100 mL IVPB-ADDV  3.375 g Intravenous Q8H    pantoprazole (Protonix) 40 mg in sodium chloride 0.9% PF 10 mL IV push  40 mg Intravenous Daily    bisacodyl (Dulcolax) 10 MG rectal suppository 10 mg  10 mg Rectal Q12H PRN    dextrose 5% infusion   Intravenous Continuous    clonazePAM (KLONOPIN) disintegrating tab 0.25 mg  0.25 mg Oral BID PRN    lactulose (CHRONULAC) 10 GM/15ML solution 30 g  30 g Oral Once    metoprolol (Lopressor) 5 mg/5mL injection 5 mg  5 mg Intravenous Q4H PRN    [Held by provider] apixaban (Eliquis) tab 5 mg  5 mg Oral BID    traMADol (Ultram) tab 50 mg  50 mg Oral Q8H PRN     ondansetron (Zofran) 4 MG/2ML injection 4 mg  4 mg Intravenous Q6H PRN    acetaminophen (Tylenol) tab 650 mg  650 mg Oral Q6H PRN    hydrALAzine (Apresoline) 20 mg/mL injection 10 mg  10 mg Intravenous Q4H PRN    alum-mag hydroxide-simethicone (Maalox) 200-200-20 MG/5ML oral suspension 30 mL  30 mL Oral QID PRN    HYDROmorphone (Dilaudid) 1 MG/ML injection 0.5 mg  0.5 mg Intravenous Q2H PRN    Or    HYDROmorphone (Dilaudid) 1 MG/ML injection 1 mg  1 mg Intravenous Q4H PRN    thiamine (Vitamin B1) tab 100 mg  100 mg Oral Daily     Medications Prior to Admission   Medication Sig    venlafaxine 37.5 MG Oral Tab Take 1 tablet (37.5 mg total) by mouth 2 (two) times daily.    apixaban 5 MG Oral Tab Take 1 tablet (5 mg total) by mouth 2 (two) times daily.    Boswellia Gee (BOSWELLIA OR) Take by mouth daily.    Calcium Carbonate-Vitamin D (OSCAL 500/200 D-3 OR) Take 1 tablet by mouth daily.    B Complex-C (SUPER B COMPLEX OR) Take 1 tablet by mouth daily.    Multiple Vitamins-Minerals (CENTRUM SILVER) Oral Tab Take 1 tablet by mouth daily.    traMADol 50 MG Oral Tab Take 1 tablet (50 mg total) by mouth every 8 (eight) hours as needed for Pain. (Patient not taking: Reported on 9/27/2024)       Allergies  No Known Allergies    Review of Systems:   As by Admitting/Attending    Results:   Laboratory Data:  Lab Results   Component Value Date    WBC 4.1 10/02/2024    HGB 10.4 (L) 10/02/2024    HCT 31.2 (L) 10/02/2024    .0 (H) 10/02/2024    CREATSERUM 1.61 (H) 10/02/2024    BUN 46 (H) 10/02/2024     10/02/2024    K 3.9 10/02/2024     10/02/2024    CO2 22.0 10/02/2024     (H) 10/02/2024    CA 10.2 10/02/2024    ALB 2.4 (L) 09/30/2024    ALKPHO 84 09/30/2024    TP 5.2 (L) 09/30/2024    AST 36 (H) 09/30/2024    ALT 10 09/30/2024    T4F 0.9 10/02/2024    TSH 1.720 10/02/2024    DDIMER 2.80 (H) 09/28/2024    ESRML 25 10/02/2024    CRP 4.60 (H) 01/31/2024    MG 2.7 (H) 10/02/2024    PHOS 4.2 10/02/2024     CK 93 10/02/2024    B12 582 09/27/2024         Imaging:  XR CHEST AP PORTABLE  (CPT=71045)    Result Date: 10/3/2024  CONCLUSION:  1. There has been removal of the previously seen large-bore Dobbhoff tube.  2. Moderate left larger than right pleural effusions and associated basilar atelectasis, with or without superimposed pneumonia.  3. Redemonstration of a lobulated opacity at the right lung base. Continued surveillance is advised.   A preliminary report was issued by the [facility] teleradiology service. There are no major discrepancies.   Dictated by (CST): Alfredo Deal MD on 10/03/2024 at 7:41 AM     Finalized by (CST): Alfredo Deal MD on 10/03/2024 at 7:44 AM          MRI SPINE CERVICAL (CPT=72141)    Result Date: 10/2/2024  CONCLUSION:  1. Normal cervical and upper thoracic cord through T3. 2. Multilevel degenerative disc disease/spondylosis.  Disc osteophyte complex at C4-5 results in mild ventral cord contact and mild central narrowing.  No moderate or high-grade central narrowing. 3. Image degradation by patient related motion artifact.    Dictated by (CST): Jomar Andre MD on 10/02/2024 at 5:13 PM     Finalized by (CST): Jomar Andre MD on 10/02/2024 at 5:23 PM          MRI BRAIN (CPT=70551)    Result Date: 10/2/2024  CONCLUSION:   1. No acute intracranial abnormality. 2. Moderate global parenchymal volume loss. 3. 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  4. Lesser incidental findings described above.    Dictated by (CST): Jay Jay Golden MD on 10/02/2024 at 2:58 PM     Finalized by (CST): Jay Jay Golden MD on 10/02/2024 at 3:06 PM          XR CHEST AP PORTABLE  (CPT=71045)    Result Date: 10/2/2024  CONCLUSION:   New ovoid opacity at the right lung base.  Could represent round pneumonia or atelectasis.  Recommend attention on follow-up.  Small left pleural effusion with left basilar opacity,  unchanged.    Dictated by (CST): Ariane Muro MD on 10/02/2024 at 7:46 AM     Finalized by (CST): Ariane Muro MD on 10/02/2024 at 7:48 AM          XR CHEST AP PORTABLE  (CPT=71045)    Result Date: 10/1/2024  CONCLUSION:  1. Feeding tube tip in proximal gastric antrum. 2. Bibasilar atelectasis and or pneumonia.  Improved aeration right lung base.    Dictated by (CST): Jomar Andre MD on 10/01/2024 at 4:54 PM     Finalized by (CST): Jomar Andre MD on 10/01/2024 at 4:55 PM          US VENOUS DOPPLER LEG BILAT - DIAG IMG (CPT=93970)    Result Date: 9/30/2024  CONCLUSION: Normal examination.     Dictated by (CST): Jordan Dawn MD on 9/30/2024 at 2:30 PM     Finalized by (CST): Jordan Dawn MD on 9/30/2024 at 2:31 PM           Vital Signs:   Blood pressure 118/58, pulse 108, temperature 96.5 °F (35.8 °C), temperature source Temporal, resp. rate (!) 30, height 61\", weight 42.2 kg (93 lb 0.6 oz), SpO2 96%.    Mental Status Exam:   Appearance: Stated age female, in hospital gown, laying down in hospital bed. Cachetic.    at bedside  Psychomotor: psychomotor retardation.  Severely lethargic.  Orientation: Alert and oriented to person, place, time and condition.  Gait: Not evaluated.  Attitude/Coorperation: Cooperative and attentive.  Otherwise extremely lethargic.  Behavior: Appropriate. lethargic  Speech: slowed, soft, incomprehensible mumbling,   Mood: Patient reporting depressed mood improved today.  Affect: Dysphoric congruent with the mood, restricted.  Thought process: slowed, distractible.   Thought content: Patient denies any suicidal or homicidal ideation.  Perceptions: Patient denies any auditory or visual hallucinations.  Concentration: Grossly intact.  Memory: Grossly intact.  Intellect: Average.  Judgment and Insight: Questionable.     Impression:     Major depressive disorder, single episode severe with melancholic features.      Weakness generalized    Hypercalcemia    Anemia    Failure  to thrive in adult      Patient is a 79 year old   female with past medical history of HTN, osteopenia, DVT, and anorexia who was admitted to the hospital for Weakness generalized: The patient has been demonstrating generalized weakness for past two weeks,accompanied by anorexia, malaise, and weight loss of 50 pounds over past two years. Patient was unable to walk by herself using her walker.  Patient indicated for psych consult for evaluation and advise.    09/28/2024: Patient demonstrating chronic depression secondary to significant life stressor. Patient demonstrates restricted emotional capacity with predominant depressed affect. Depression manifesting as cachexia in patient secondary to decreased appetite, loss of motivation, and generalized weakness.     09/30/2024: Patient demonstrating improved mood today after son's visit, indicating that source of depression was isolation from son. Patient noted to be smiling and appears more hopeful today.     10/01/2024: Patient demonstrating improved mood however becoming increasing lethargic secondary to medical condition. Patient easily distractible and demonstrating slowed thought process. Patient unable hold conversation.        10/2/2024: The patient continued demonstrating severe depression with physical deterioration, hopelessness and helplessness otherwise no suicidality and expressed desire to fight her current medical condition.    10/03/2024: The patient demonstrating clinical deterioration due to medical condition.     Discussed risk and benefit, acknowledging the current symptom and severity.  At this point, I would recommend the following approach:      Focus on safety  Focus on education and support.  Focus on insight orientation helping the patient understand diagnosis and treatment plan.  Lower Lexapro to 5 mg nightly.  Discontinue Abilify.  Utilize Klonopin 0.25 mg twice daily as needed for anxiety.  Coordinate plan with team.  Orders  This Visit:  Orders Placed This Encounter   Procedures    CBC With Differential With Platelet    Basic Metabolic Panel (8)    Urinalysis with Culture Reflex    Basic Metabolic Panel (8)    CBC With Differential With Platelet    Ferritin    Iron And Tibc    Hepatic Function Panel (7)    UA Microscopic only, urine    PTH, Intact    Vitamin D    Vitamin D, 25-Hydroxy    Potassium    LDH    Copper, Serum    Monoclonal Protein Study    Vitamin B12    Folic Acid Serum (Folate)    Haptoglobin    Parathyroid Hormone-related Peptide (PTH-rP) ([employer])    Vitamin D    Bence Ferro protein, 24 Hour Urine    Protein, 24-Hr Urine    Bence Ferro protein, 24 Hour Urine    SERUM MONOCLONAL PROTEIN STUDY    Vitamin D, 1,25 Dihydroxy    Zinc, Plasma Or Serum    Basic Metabolic Panel (8)    CBC With Differential With Platelet    Magnesium    Magnesium    CEA    Carbohydrate Antigen 19-9    -II    Troponin I (High Sensitivity)    BNP (Brain Natriuretic Peptide)    D-Dimer    Magnesium    Basic Metabolic Panel (8)    CBC With Differential With Platelet    Arterial blood gas    Procalcitonin    Cortisol    CBC With Differential With Platelet    Comp Metabolic Panel (14)    Magnesium    Scan slide    RBC Morphology Scan    Arterial blood gas    Sed Rate, Westergren (Automated)    Basic Metabolic Panel (8)    CBC With Differential With Platelet    Magnesium    Scan slide    Phosphorus    Basic Metabolic Panel (8)    CBC With Differential With Platelet    Phosphorus    Magnesium    Scan slide    Potassium    Ammonia, Plasma    Autoimmune Neurology/Comprehensive Paraneoplastic Profile    CK (Creatine Kinase) (Not Creatinine)    Connective Tissue Disease (JUAN MANUEL) Screen, Reflex Specific Antibody    HMGCR Antibody, IgG    Immunofixation (LORENZO)    Myasthenia Gravis Reflex Panel    Myositis Antibody Comprehensive Panel    Rheumatoid Arthritis Factor    Sed Rate, Westergren (Automated)    TSH and Free T4    CBC With Differential With  Platelet    Basic Metabolic Panel (8)    Occult Blood Stool, Diagnostic    Urine Culture, Routine       Meds This Visit:  Requested Prescriptions      No prescriptions requested or ordered in this encounter       Xavier Herbert MD  10/03/2024    Note to Patient: The 21st Century Cures Act makes medical notes like these available to patients in the interest of transparency. However, be advised this is a medical document. It is intended as peer to peer communication. It is written in medical language and may contain abbreviations or verbiage that are unfamiliar. It may appear blunt or direct. Medical documents are intended to carry relevant information, facts as evident, and the clinical opinion of the practitioner. This note may have been transcribed using a voice dictation system. Voice recognition errors may occur. This should not be taken to alter the content or meaning of this note.

## 2024-10-03 NOTE — PHYSICAL THERAPY NOTE
Attempted PT follow up session. Pt with rapid called overnight due to worsening respiratory status, transferred down to CCU and now presently on BIPAP at 40%. Discussed with RN, pt not appropriate for therapy today. Will follow up tomorrow pending medical improvement.

## 2024-10-03 NOTE — PROGRESS NOTES
[facility]  part of [facility]     Hospitalist Progress Note     Julissa Ferro Patient Status:  Inpatient    1945 MRN [id number]   Location [facility] 5SW/SE Attending Michael Chamberlain MD   Hosp Day # 6 PCP Jesus Kirkland MD     Chief Complaint:   Chief Complaint   Patient presents with    Fatigue        Subjective:     Patient seen lying in bed.  Remains fairly unresponsive and weak.  Currently on BiPAP.   bedside.   very emotional.  Expressed concerns about patient's worsening status.    Objective:      Vital signs:  Vitals:    10/03/24 0500 10/03/24 0600 10/03/24 0800 10/03/24 1000   BP: 104/79 131/77 126/55 118/58   BP Location:   Right arm Right arm   Pulse: 93 103 103 108   Resp: (!) 27 (!) 33 (!) 30 (!) 30   Temp: 97 °F (36.1 °C)  96.5 °F (35.8 °C)    TempSrc: Temporal  Temporal    SpO2: 97% 94% 94% 96%   Weight:       Height:           Intake/Output Summary (Last 24 hours) at 10/3/2024 1052  Last data filed at 10/3/2024 0000  Gross per 24 hour   Intake 600 ml   Output 300 ml   Net 300 ml           Physical Exam:    GENERAL: Thin, frail and ill-appearing female.  Minimally responsive and weak on BiPAP. in mild acute distress.  HEART:  Regular rhythm,  tachycardia  LUNGS:  Air entry was decreased.  Mild increased work of breathing and increased tachypnea.  ABDOMEN: Soft and non-tender.      Diagnostic Data:    Labs:    Recent Labs   Lab 24  0516 10/01/24  0405 10/02/24  040   WBC 14.6* 6.8 4.1   HGB 8.9* 10.1* 10.4*   MCV 89.6 82.0 81.0   .0 413.0 481.0*       Recent Labs   Lab 24  0612 24  1235 24  1435 24  0514 10/01/24  0405 10/02/24  0404 10/02/24  1604   *   < >  --    < > 73 104* 132*  --    BUN 23   < >  --    < > 17 36* 46*  --    CREATSERUM 0.81   < >  --    < > 1.05* 1.30* 1.61*  --    CA 12.2*   < >  --    < > 10.7* 10.4 10.2  --    ALB 3.1*  --  2.41*  --  2.4*  --   --   --    NA  139   < >  --    < > 131* 136 136  --    K 3.5   < >  --    < > 4.1 3.5 2.9* 3.9      < >  --    < > 104 103 104  --    CO2 30.0   < >  --    < > 19.0* 23.0 22.0  --    ALKPHO 90  --   --   --  84  --   --   --    AST 32  --   --   --  36*  --   --   --    ALT 8*  --   --   --  10  --   --   --    BILT 0.6  --   --   --  0.9  --   --   --    TP 5.8  --  5.1*  --  5.2*  --   --   --     < > = values in this interval not displayed.           Estimated Creatinine Clearance: 18.9 mL/min (A) (based on SCr of 1.61 mg/dL (H)).    No results for input(s): \"PTP\", \"INR\" in the last 168 hours.         COVID-19  No results found for: \"COVID19\"    Pro-Calcitonin  Recent Labs   Lab 09/29/24  1219   PCT 0.27*       Cardiac  No results for input(s): \"TROP\", \"PBNP\" in the last 168 hours.    Inflammatory Markers  Recent Labs   Lab 09/26/24 2006 09/27/24  1235 09/28/24  1543   CHERELLE 619*  --   --    LDH  --  217  --    DDIMER  --   --  2.80*       Culture:  Hospital Encounter on 09/26/24   1. Urine Culture, Routine     Status: None    Collection Time: 09/26/24 10:36 PM    Specimen: Urine, clean catch   Result Value Ref Range    Urine Culture  N/A     <10,000 cfu/ml Multiple species present- probable contamination.       XR CHEST AP PORTABLE  (CPT=71045)    Result Date: 10/3/2024  CONCLUSION:  1. There has been removal of the previously seen large-bore Dobbhoff tube.  2. Moderate left larger than right pleural effusions and associated basilar atelectasis, with or without superimposed pneumonia.  3. Redemonstration of a lobulated opacity at the right lung base. Continued surveillance is advised.   A preliminary report was issued by the [facility] teleradiology service. There are no major discrepancies.   Dictated by (CST): Alfredo Deal MD on 10/03/2024 at 7:41 AM     Finalized by (CST): Alfredo Deal MD on 10/03/2024 at 7:44 AM          MRI SPINE CERVICAL (CPT=72141)    Result Date: 10/2/2024  CONCLUSION:  1. Normal  cervical and upper thoracic cord through T3. 2. Multilevel degenerative disc disease/spondylosis.  Disc osteophyte complex at C4-5 results in mild ventral cord contact and mild central narrowing.  No moderate or high-grade central narrowing. 3. Image degradation by patient related motion artifact.    Dictated by (CST): Jomar Andre MD on 10/02/2024 at 5:13 PM     Finalized by (CST): Jomar Andre MD on 10/02/2024 at 5:23 PM          MRI BRAIN (CPT=70551)    Result Date: 10/2/2024  CONCLUSION:   1. No acute intracranial abnormality. 2. Moderate global parenchymal volume loss. 3. 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  4. Lesser incidental findings described above.    Dictated by (CST): Jay Jay Golden MD on 10/02/2024 at 2:58 PM     Finalized by (CST): Jay Jay Golden MD on 10/02/2024 at 3:06 PM          XR CHEST AP PORTABLE  (CPT=71045)    Result Date: 10/2/2024  CONCLUSION:   New ovoid opacity at the right lung base.  Could represent round pneumonia or atelectasis.  Recommend attention on follow-up.  Small left pleural effusion with left basilar opacity, unchanged.    Dictated by (CST): Ariane Muro MD on 10/02/2024 at 7:46 AM     Finalized by (CST): Ariane Muro MD on 10/02/2024 at 7:48 AM          XR CHEST AP PORTABLE  (CPT=71045)    Result Date: 10/1/2024  CONCLUSION:  1. Feeding tube tip in proximal gastric antrum. 2. Bibasilar atelectasis and or pneumonia.  Improved aeration right lung base.    Dictated by (CST): Jomar Andre MD on 10/01/2024 at 4:54 PM     Finalized by (CST): Jomar Andre MD on 10/01/2024 at 4:55 PM          US VENOUS DOPPLER LEG BILAT - DIAG IMG (CPT=93970)    Result Date: 9/30/2024  CONCLUSION: Normal examination.     Dictated by (CST): Jordan Dawn MD on 9/30/2024 at 2:30 PM     Finalized by (CST): Jordan Dawn MD on 9/30/2024 at 2:31 PM                 Medications:     scopolamine  1 patch Transdermal Q72H    ipratropium-albuterol  3 mL Nebulization 4 times per day    sodium chloride (hypertonic)  4 mL Nebulization 2 times daily    escitalopram  10 mg Oral Nightly    ARIPiprazole  1 mg Oral Daily    metoprolol tartrate  25 mg Oral 2x Daily(Beta Blocker)    heparin  5,000 Units Subcutaneous 2 times per day    piperacillin-tazobactam  3.375 g Intravenous Q8H    pantoprazole  40 mg Intravenous Daily    lactulose  30 g Oral Once    [Held by provider] apixaban  5 mg Oral BID    thiamine  100 mg Oral Daily       Assessment & Plan:       Acute hypoxemic respiratory failure  Possible Aspiration pneumonia  Worsening overnight  Transferred to ICU on noninvasive ventilation.  Repeat chest x-ray this a.m. reviewed.  Noted moderate left and right pleural effusions associated with atelectasis  Pulm consulted, expressed concerns for possible mucous plugging.  Continue broad-spectrum antibiotics  Attempting to wean off bipap on to NC  Failed speech re-eval  Continue on tube feeds    Failure to thrive  Severe Depression (situational) and asthenia - has not seen or talked to her twin boys in nearly 3 years  hx of mental health problems (SI).   Psych consulted, lito recs  Continue to evaluate.  Palliative care on consult, may need discussion regarding G-tube in future.     Urinary retention  Bilateral non-obstructing calculus.   Seen by urology  Plan to cont anaya for now  Repeat renal us to assess for improvement     Normocytic anemia  Iron deficiency  Heme/onc following  Transfuse for hemoglobin less than 7  fecal occult blood negative  Continue ferric gluconate      Hypercalcemia  Dehydration  Severe protein caloric malnutrition, BMI 17  Weaning IV fluids initiated  Will hold calcium supplements for now,  PTH levels are low.    PT/OT for deconditioning    Dietitian Malnutrition Assessment    Evaluation for Malnutrition: Criteria for severe malnutrition diagnosis- chronic illness related to   Wt  loss greater than 20% in 1 year., Energy intake less than 75% for greater than 1 month., Body fat severe depletion., Muscle mass severe depletion.               RD Malnutrition Care Plan: Encouraged increased PO intake., Encouraged small frequent meals with emphasis on high calorie/high protein., Intiated ONS (oral nutritional supplements)., Requested  with ordering meals, tray set up and/or feeding as needed., Ordered thiamin.    Body Fat/Muscle Mass: Severe depletion body fat., Moderate depletion muscle mass., Severe depletion muscle mass. orbital region., triceps region. temple region., clavicle region., scapular region., shoulder region., dorsal kramer region., thigh region., calf region.    Physician Assessment     Patient has a diagnosis of severe malnutrition    Advanced Care Planning:  Discussion held at bedside with patient  regarding code status, diagnosis, prognosis, and goals of care. Pt's  was emotional at the time.  He did express that he did not wish to have life saving measures such as chest compressions or mechanical ventilation.  He stated he does not believe the patient would want to have chest compressions or be placed on a ventilator.  He expressed desire he does not wish to continue all treatment up to that point.  DNR/select order placed in system.  POLST form to be completed. ACP time 25 min      Plan of care discussed with patient and  at bedside . Discussed management/test result(s) with Rn    Quality:  DVT Prophylaxis: Eliquis  CODE status: Full  Estimated date of discharge: TBD  Discharge is dependent on: clinical stability      Michael Chamberlain MD          This note was prepared using Dragon Medical voice recognition dictation software. As a result errors may occur. When identified these errors have been corrected. While every attempt is made to correct errors during dictation discrepancies may still exist

## 2024-10-03 NOTE — SPIRITUAL CARE NOTE
Spiritual Care Visit Note    Patient Name: Julissa Ferro Date of Spiritual Care Visit: 10/03/24   : 1945 Primary Dx: <principal problem not specified>       Referred By: Referral From: Care Coordination    Spiritual Care Taxonomy:    Intended Effects: Build relationship of care and support    Methods: Accompany someone in their spiritual/Confucianist practice outside your conrad tradition;Assist with spiritual/Confucianist practices;Collaborate with care team member;Offer spiritual/Confucianist support;Offer emotional support    Interventions: Acknowledge current situation;Acknowledge response to difficult experience;Active listening;Explain  role;Facilitate preparing for end of life    Visit Type/Summary:     - Spiritual Care: Responded to a request for spiritual care and assessed the patient for spiritual care needs. Responded to a request via the on call phone Consulted with RN prior to visit. Offered empathic listening and emotional support. Patient and family expressed appreciation for  visit. Provided support for Patient's spiritual/Confucianist requests. Will wait for family members to decide to call a  for anointing.  Nurse will contact .  remains available as needed for follow up.    Spiritual Care support can be requested via an Epic consult. For urgent/immediate needs, please contact the On Call  at: [place]: alina [phone]    Dae [phone]

## 2024-10-03 NOTE — SPIRITUAL CARE NOTE
Spiritual Care Visit Note    Patient Name: Julissa Ferro Date of Spiritual Care Visit: 10/03/24   : 1945 Primary Dx: <principal problem not specified>       Referred By: Referral From: Family;, Anointing of the Sick Request    Spiritual Care Taxonomy:    Intended Effects: Aligning care plan with patient's values    Methods: Encouraging spiritual/Congregational practices    Interventions: Connect someone with their conrad community/clergy;Communicate patient's needs/concerns to others    Visit Type/Summary:     received request for Anointing of the Sick from family, writer spoke with Rev Hinson from [facility] who will visit with family this evening.      - Spiritual Care: Responded to a request via the on call phone Consulted with RN prior to visit. Provided support for Patient's spiritual/Congregational requests. Coordinated  visit for Sacrament of the Sick.  remains available for follow up.    Spiritual Care support can be requested via an Epic consult. For urgent/immediate needs, please contact the On Call  at: [place]: ext [phone]    Chaplain William.

## 2024-10-04 NOTE — HOSPICE RN NOTE
[facility] Inpatient Nursing Rounds:     Visited patient at bedside, spouse Andres present during visit. Patient is obtunded, occasionally opens eyes to voice. Furrowed brow and facial grimacing noted at times. Moist, weak non-productive cough. Open mouth breathing. RR 20's and sightly labored. Lungs sound coarse/rhonchi throughout. Skin is warm, no mottling noted. Hypoactive bowel sounds. Davidson draining small zach urine. LBM: 10/2/24     Reviewed symptom management over the past 24 hours: Dilaudid drip at 0.2 mg/hr and Haldol IVP x1.     PPS: 10%    Patient remains eligible for general inpatient hospice care for symptom management of dyspnea requiring frequent nursing assessments and interventions including titration of IV medications. POC discussed with Andres and Bahman MCCLELLAND. All in agreement. [facility] will continue to support the patient and family.      1900-Hospice evening rounds:    Patient remains unresponsive. Dilaudid drip increased to 0.6 mg/hr for dyspnea and pain. No family present during visit.    Carolynn DUMONTN, RN [facility]  [phone] (office) or [phone] (after hours)

## 2024-10-04 NOTE — DISCHARGE SUMMARY
[facility]  part of [facility]    Discharge Summary    Julissa Ferro Patient Status:  Inpatient    1945 MRN [id number]   Location [facility] 2W/SW Attending No att. providers found   Hosp Day # 6 PCP Jesus Kirkland MD     Date of Admission: 2024     Date of Discharge: 10/03/24      Lace+ Score: 68  59-90 High Risk  29-58 Medium Risk  0-28   Low Risk.    TCM Follow-Up Recommendation:  LACE > 58: High Risk of readmission after discharge from the hospital.    DISCHARGE DX: Principal Problem:    Weakness generalized  Active Problems:    Hypercalcemia    Anemia    Failure to thrive in adult    Severe major depression, single episode, without psychotic features (HCC)    Palliative care by specialist    Dysphagia    Poor appetite    Goals of care, counseling/discussion    Dyspnea       The patient was seen and examined on day of discharge and this discharge summary is in conjunction with any daily progress note from day of discharge.    HPI per admitting physician: \"Julissa Ferro is a(n) 79 year old female, with a past medical history significant for tubular adenomas in the colon with repeat colonoscopy done last month however unable to go past the distal transverse colon presents today with poor appetite creasing weakness and loss of weight over the last few weeks.  Describes the onset as gradual with progressive worsening now requiring assistance with activities of daily living.  Claims she is unable to get up and move around on her own.  Denies any nausea or vomiting denies any diarrhea or constipation.  Denies any fever or chills denies any significant abdominal pain.  No dysuria\"       Hospital Course:      Acute hypoxemic respiratory failure  Possible Aspiration pneumonia  Worsening overnight  Transferred to ICU on noninvasive ventilation.  Repeat chest x-ray this a.m. reviewed.  Noted moderate left and right pleural effusions associated with atelectasis  Pulm  consulted, expressed concerns for possible mucous plugging.  Continue broad-spectrum antibiotics  Attempting to wean off bipap on to NC  Failed speech re-eval  Continue on tube feeds     Failure to thrive  Severe Depression (situational) and asthenia - has not seen or talked to her twin boys in nearly 3 years  hx of mental health problems (SI).   Psych consulted, lito recs  Continue to evaluate.  Palliative care on consult, may need discussion regarding G-tube in future.     Urinary retention  Bilateral non-obstructing calculus.   Seen by urology  Plan to cont anaya for now  Repeat renal us to assess for improvement     Normocytic anemia  Iron deficiency  Heme/onc following  Transfuse for hemoglobin less than 7  fecal occult blood negative  Continue ferric gluconate      Hypercalcemia  Dehydration  Severe protein caloric malnutrition, BMI 17  Weaning IV fluids initiated  Will hold calcium supplements for now,  PTH levels are low.    PT/OT for deconditioning    After further discussion with hospice team, pt's  made decision to pursue comfort care only with hospice. Pt was discharged and readmitted under hospice care.       CULTURE:   Hospital Encounter on 09/26/24   1. Urine Culture, Routine     Status: None    Collection Time: 09/26/24 10:36 PM    Specimen: Urine, clean catch   Result Value Ref Range    Urine Culture  N/A     <10,000 cfu/ml Multiple species present- probable contamination.       IMAGING STUDIES: SOME MAY NEED FOLLOW UP WITH PCP   XR CHEST AP PORTABLE  (CPT=71045)    Result Date: 10/3/2024  CONCLUSION:   Mild interstitial and alveolar edema, slightly increased.  Superimposed aspiration/pneumonia is a consideration.  Suspected trace bilateral pleural effusions.  Dobbhoff tube tip at the proximal stomach.  Recommend advancement if positioning is desired within the second portion of the duodenum.  Previously seen right basilar opacity no longer visualized.     Dictated by (CST): Ariane Muro  MD on 10/03/2024 at 3:58 PM     Finalized by (CST): Ariane Muro MD on 10/03/2024 at 4:00 PM          XR CHEST AP PORTABLE  (CPT=71045)    Result Date: 10/3/2024  CONCLUSION:  1. There has been removal of the previously seen large-bore Dobbhoff tube.  2. Moderate left larger than right pleural effusions and associated basilar atelectasis, with or without superimposed pneumonia.  3. Redemonstration of a lobulated opacity at the right lung base. Continued surveillance is advised.   A preliminary report was issued by the [facility] teleradiology service. There are no major discrepancies.   Dictated by (CST): Alfredo Deal MD on 10/03/2024 at 7:41 AM     Finalized by (CST): Alfredo Deal MD on 10/03/2024 at 7:44 AM          MRI SPINE CERVICAL (CPT=72141)    Result Date: 10/2/2024  CONCLUSION:  1. Normal cervical and upper thoracic cord through T3. 2. Multilevel degenerative disc disease/spondylosis.  Disc osteophyte complex at C4-5 results in mild ventral cord contact and mild central narrowing.  No moderate or high-grade central narrowing. 3. Image degradation by patient related motion artifact.    Dictated by (CST): Jomar Andre MD on 10/02/2024 at 5:13 PM     Finalized by (CST): Jomar Andre MD on 10/02/2024 at 5:23 PM          MRI BRAIN (CPT=70551)    Result Date: 10/2/2024  CONCLUSION:   1. No acute intracranial abnormality. 2. Moderate global parenchymal volume loss. 3. 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  4. Lesser incidental findings described above.    Dictated by (CST): Jay Jay Golden MD on 10/02/2024 at 2:58 PM     Finalized by (CST): Jay Jay Golden MD on 10/02/2024 at 3:06 PM          XR CHEST AP PORTABLE  (CPT=71045)    Result Date: 10/2/2024  CONCLUSION:   New ovoid opacity at the right lung base.  Could represent round pneumonia or atelectasis.  Recommend attention on follow-up.  Small  left pleural effusion with left basilar opacity, unchanged.    Dictated by (CST): Ariane Muro MD on 10/02/2024 at 7:46 AM     Finalized by (CST): Ariane Muro MD on 10/02/2024 at 7:48 AM          XR CHEST AP PORTABLE  (CPT=71045)    Result Date: 10/1/2024  CONCLUSION:  1. Feeding tube tip in proximal gastric antrum. 2. Bibasilar atelectasis and or pneumonia.  Improved aeration right lung base.    Dictated by (CST): Jomar Andre MD on 10/01/2024 at 4:54 PM     Finalized by (CST): Jomar Andre MD on 10/01/2024 at 4:55 PM          US VENOUS DOPPLER LEG BILAT - DIAG IMG (CPT=93970)    Result Date: 9/30/2024  CONCLUSION: Normal examination.     Dictated by (CST): Jordan Dawn MD on 9/30/2024 at 2:30 PM     Finalized by (CST): Jordan Dawn MD on 9/30/2024 at 2:31 PM          XR CHEST AP PORTABLE  (CPT=71045)    Result Date: 9/30/2024  CONCLUSION:  1. Borderline cardiomegaly.  Tortuous aorta. 2. Bibasilar parenchymal abnormality and/or atelectatic changes, worse on the left. 3. Small bilateral effusions, greater on left.  Improved aeration right lung base..    Dictated by (CST): Adonis Issa MD on 9/30/2024 at 8:04 AM     Finalized by (CST): Adonis Issa MD on 9/30/2024 at 8:05 AM          CT CHEST PE AORTA (IV ONLY) (CPT=71260)    Result Date: 9/29/2024  CONCLUSION:   No evidence of pulmonary embolism to the bilateral segmental arterial level.  Small bilateral pleural effusions.  Near complete bilateral lower lobe consolidation which could reflect atelectasis with or without superimposed pneumonia.  Mild patchy ground-glass opacity in the superior segment right lower lobe and right upper lobe with mild reticulonodular opacity which may be infectious/inflammatory in etiology.  Small pericardial effusion.  Lesser incidental findings as above.    Dictated by (CST): Feng Carranza MD on 9/29/2024 at 1:10 PM     Finalized by (CST): Feng Carranza MD on 9/29/2024 at 1:16 PM          XR CHEST  AP PORTABLE  (CPT=71045)    Result Date: 9/29/2024  CONCLUSION:  Right greater than left bilateral basilar opacities increased on the right.  On the right, this may reflect atelectasis with or without superimposed pneumonia.  Left basilar opacity may reflect a combination of small effusion and parenchymal opacity.       Dictated by (CST): Feng Carranza MD on 9/29/2024 at 11:20 AM     Finalized by (CST): Feng Carranza MD on 9/29/2024 at 11:22 AM          XR CHEST AP PORTABLE  (CPT=71045)    Result Date: 9/28/2024  CONCLUSION:   Left greater than right basilar opacities, which may reflect atelectasis with or without superimposed pneumonia.  Mild elevation of the right hemidiaphragm.    Dictated by (CST): Jay Jay Golden MD on 9/28/2024 at 4:11 PM     Finalized by (CST): Jay Jay Golden MD on 9/28/2024 at 4:13 PM          CT CHEST+ABDOMEN+PELVIS(CPT=71250/18340)    Result Date: 9/27/2024  CONCLUSION:  1. No evidence of a primary malignancy. 2. Small amount of ascites.  Mild body wall edema. 3. Small pericardial effusion. 4. Marked distention of urinary bladder associated with mild to moderate bilateral hydronephrosis and mild hydroureter.  Findings may be related to reflux. 5. Nonobstructing bilateral renal calculi.  No ureteral calculus. 6. Colonic diverticulosis.  Moderate amount of stool in the colon. 7. Atherosclerotic vascular calcification including coronary artery calcification.    Dictated by (CST): Jomar Andre MD on 9/27/2024 at 8:39 PM     Finalized by (CST): Jomar Andre MD on 9/27/2024 at 8:58 PM           LABS :     Lab Results   Component Value Date    WBC 4.1 10/02/2024    HGB 10.4 (L) 10/02/2024    HCT 31.2 (L) 10/02/2024    .0 (H) 10/02/2024    CREATSERUM 1.61 (H) 10/02/2024    BUN 46 (H) 10/02/2024     10/02/2024    K 3.9 10/02/2024     10/02/2024    CO2 22.0 10/02/2024     (H) 10/02/2024    CA 10.2 10/02/2024    ALB 2.4 (L) 09/30/2024    ALKPHO 84 09/30/2024     BILT 0.9 09/30/2024    TP 5.2 (L) 09/30/2024    AST 36 (H) 09/30/2024    ALT 10 09/30/2024    T4F 0.9 10/02/2024    TSH 1.720 10/02/2024    DDIMER 2.80 (H) 09/28/2024    ESRML 25 10/02/2024    CRP 4.60 (H) 01/31/2024    MG 2.7 (H) 10/02/2024    PHOS 4.2 10/02/2024    CK 93 10/02/2024    B12 582 09/27/2024       Recent Labs   Lab 09/30/24  0516 10/01/24  0405 10/02/24  0404   RBC 3.26* 3.73* 3.85   HGB 8.9* 10.1* 10.4*   HCT 29.2* 30.6* 31.2*   MCV 89.6 82.0 81.0   MCH 27.3 27.1 27.0   MCHC 30.5* 33.0 33.3   RDW 17.1* 16.9* 17.0*   NEPRELIM 13.77* 5.93 3.52   WBC 14.6* 6.8 4.1   .0 413.0 481.0*     Recent Labs   Lab 09/27/24  1235 09/27/24  1435 09/30/24  0514 10/01/24  0405 10/02/24  0404 10/02/24  1604   GLU  --    < > 73 104* 132*  --    BUN  --    < > 17 36* 46*  --    CREATSERUM  --    < > 1.05* 1.30* 1.61*  --    CA  --    < > 10.7* 10.4 10.2  --    ALB 2.41*  --  2.4*  --   --   --    NA  --    < > 131* 136 136  --    K  --    < > 4.1 3.5 2.9* 3.9   CL  --    < > 104 103 104  --    CO2  --    < > 19.0* 23.0 22.0  --    ALKPHO  --   --  84  --   --   --    AST  --   --  36*  --   --   --    ALT  --   --  10  --   --   --    BILT  --   --  0.9  --   --   --    TP 5.1*  --  5.2*  --   --   --     < > = values in this interval not displayed.     No results found for: \"PT\", \"INR\"    Disposition: Discharge and readmit to hospice    Condition at Discharge: Stable    Michael Chamberlain MD

## 2024-10-04 NOTE — SPIRITUAL CARE NOTE
Spiritual Care Visit Note    Patient Name: Julissa Ferro Date of Spiritual Care Visit: 10/04/24   : 1945 Primary Dx: <principal problem not specified>       Referred By: Referral From: Nurse    Spiritual Care Taxonomy:    Intended Effects: Demonstrate caring and concern    Methods: Assist with finding purpose;Assist with spiritual/Mandaen practices;Demonstrate acceptance    Interventions: Acknowledge current situation;Acknowledge response to difficult experience;Active listening;Discuss concerns;Philippi;Provide hospitality;Explain  role    Visit Type/Summary:     - Spiritual Care: Responded to a request via the on call phone Consulted with RN prior to visit. Offered empathic listening and emotional support. Provided information regarding how to contact Spiritual Care and left a Spiritual Care information card. Provided support for Patient's spiritual/Mandaen requests. Offered prayer.  checked to make sure patient was annointed by a . According to log, Very Reverend Father Gee anointed her when she 89 on 10/3/24.  remains available for follow up.    Spiritual Care support can be requested via an Epic consult. For urgent/immediate needs, please contact the On Call  at: [place]: ext [phone]    Chaplain Resident, Adri Roper, PhD

## 2024-10-04 NOTE — H&P
[facility]  part of [facility]  HISTORY AND PHYSICAL       Julissa Ferro Patient Status:  Inpatient    1945  79 year old CSN [id number]   Location 462/462-A Attending Michael Chamberlain MD     PCP Jesus Kirkland MD         DATE OF ADMISSION: 10/3/2024     CHIEF COMPLAINT: Shortness of breath     HISTORY OF PRESENT ILLNESS  This is a 79 year oldfemale who was initially admitted due to shortness of breath and generalized weakness.  Patient was being treated for possible aspiration pneumonia along with failure to thrive.  Patient's respiratory status continued to decline.  After further discussion with hospice team, patient's  made decision to pursue comfort measures only.  Patient was discharged and readmitted under inpatient hospice care.  At time of interview, patient appears sleepy, but opens eyes to voice.  Appearing comfortable.    PAST MEDICAL HISTORY  Past Medical History:    DVT (deep venous thrombosis) ([facility])    Fracture dislocation of left shoulder joint    w/ nerve injury 2009    Injury of nerve, shoulder or arm    w/ dislocation fx of L shoulder     Nerve injury    nerve damage left hand         PAST SURGICAL HISTORY  Past Surgical History:   Procedure Laterality Date    Colonoscopy N/A 8/10/2017    Procedure: COLONOSCOPY;  Surgeon: Sony Galvez MD;  Location: [facility] ENDOSCOPY    Tubal ligation         ALLERGIES   Patient has no known allergies.    MEDICATIONS  Current Discharge Medication List        CONTINUE these medications which have NOT CHANGED    Details   venlafaxine 37.5 MG Oral Tab Take 1 tablet (37.5 mg total) by mouth 2 (two) times daily.  Qty: 60 tablet, Refills: 11      apixaban 5 MG Oral Tab Take 1 tablet (5 mg total) by mouth 2 (two) times daily.  Qty: 168 tablet, Refills: 0      traMADol 50 MG Oral Tab Take 1 tablet (50 mg total) by mouth every 8 (eight) hours as needed for Pain.  Qty: 30 tablet, Refills: 3    Associated Diagnoses: Pain       Boswellia Gee (BOSWELLIA OR) Take by mouth daily.      Calcium Carbonate-Vitamin D (OSCAL 500/200 D-3 OR) Take 1 tablet by mouth daily.      B Complex-C (SUPER B COMPLEX OR) Take 1 tablet by mouth daily.      Multiple Vitamins-Minerals (CENTRUM SILVER) Oral Tab Take 1 tablet by mouth daily.             SOCIAL HISTORY  Social History     Socioeconomic History    Marital status:    Tobacco Use    Smoking status: Never    Smokeless tobacco: Never   Vaping Use    Vaping status: Never Used   Substance and Sexual Activity    Alcohol use: Yes     Comment: rarely 3 drinks a year    Drug use: No   Other Topics Concern    Caffeine Concern Yes     Comment: Coffee 2 cups daily       FAMILY HISTORY  Family History   Problem Relation Age of Onset    Alcohol and Other Disorders Associated Father     Diabetes Paternal Aunt     Diabetes Maternal Uncle     No Known Problems Son     No Known Problems Son     Breast Cancer Neg     Ovarian Cancer Neg        PHYSICAL EXAM  Vital signs:  /61 (BP Location: Right arm)   Pulse 118   Temp 97.4 °F (36.3 °C) (Temporal)   Resp 18   SpO2 94%      GENERAL: Thin, frail and ill-appearing female.  Minimally responsive and weak. in no acute distress.  HEART:  Regular rhythm,  tachycardia  LUNGS:  Air entry was decreased.  No increased work of breathing   ABDOMEN: Soft and non-tender.        IMAGING  XR CHEST AP PORTABLE  (CPT=71045)    Result Date: 10/3/2024  CONCLUSION:   Mild interstitial and alveolar edema, slightly increased.  Superimposed aspiration/pneumonia is a consideration.  Suspected trace bilateral pleural effusions.  Dobbhoff tube tip at the proximal stomach.  Recommend advancement if positioning is desired within the second portion of the duodenum.  Previously seen right basilar opacity no longer visualized.     Dictated by (CST): Ariane Muro MD on 10/03/2024 at 3:58 PM     Finalized by (CST): Ariane Muro MD on 10/03/2024 at 4:00 PM          XR CHEST AP  PORTABLE  (CPT=71045)    Result Date: 10/3/2024  CONCLUSION:  1. There has been removal of the previously seen large-bore Dobbhoff tube.  2. Moderate left larger than right pleural effusions and associated basilar atelectasis, with or without superimposed pneumonia.  3. Redemonstration of a lobulated opacity at the right lung base. Continued surveillance is advised.   A preliminary report was issued by the [facility] teleradiology service. There are no major discrepancies.   Dictated by (CST): Alfredo Deal MD on 10/03/2024 at 7:41 AM     Finalized by (CST): Alfredo Deal MD on 10/03/2024 at 7:44 AM          MRI SPINE CERVICAL (CPT=72141)    Result Date: 10/2/2024  CONCLUSION:  1. Normal cervical and upper thoracic cord through T3. 2. Multilevel degenerative disc disease/spondylosis.  Disc osteophyte complex at C4-5 results in mild ventral cord contact and mild central narrowing.  No moderate or high-grade central narrowing. 3. Image degradation by patient related motion artifact.    Dictated by (CST): Jomar Andre MD on 10/02/2024 at 5:13 PM     Finalized by (CST): Jomar Andre MD on 10/02/2024 at 5:23 PM          MRI BRAIN (CPT=70551)    Result Date: 10/2/2024  CONCLUSION:   1. No acute intracranial abnormality. 2. Moderate global parenchymal volume loss. 3. 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  4. Lesser incidental findings described above.    Dictated by (CST): Jay Jay Golden MD on 10/02/2024 at 2:58 PM     Finalized by (CST): Jay Jay Golden MD on 10/02/2024 at 3:06 PM          XR CHEST AP PORTABLE  (CPT=71045)    Result Date: 10/2/2024  CONCLUSION:   New ovoid opacity at the right lung base.  Could represent round pneumonia or atelectasis.  Recommend attention on follow-up.  Small left pleural effusion with left basilar opacity, unchanged.    Dictated by (CST): Ariane Muro MD on 10/02/2024 at  7:46 AM     Finalized by (CST): Ariane Muro MD on 10/02/2024 at 7:48 AM          XR CHEST AP PORTABLE  (CPT=71045)    Result Date: 10/1/2024  CONCLUSION:  1. Feeding tube tip in proximal gastric antrum. 2. Bibasilar atelectasis and or pneumonia.  Improved aeration right lung base.    Dictated by (CST): Jomar Andre MD on 10/01/2024 at 4:54 PM     Finalized by (CST): Jomar Andre MD on 10/01/2024 at 4:55 PM           Data:  Recent Labs   Lab 09/30/24  0516 10/01/24  0405 10/02/24  0404   RBC 3.26* 3.73* 3.85   HGB 8.9* 10.1* 10.4*   HCT 29.2* 30.6* 31.2*   MCV 89.6 82.0 81.0   MCH 27.3 27.1 27.0   MCHC 30.5* 33.0 33.3   RDW 17.1* 16.9* 17.0*   NEPRELIM 13.77* 5.93 3.52   WBC 14.6* 6.8 4.1   .0 413.0 481.0*     Recent Labs   Lab 09/27/24  1235 09/27/24  1435 09/30/24  0514 10/01/24  0405 10/02/24  0404 10/02/24  1604   GLU  --    < > 73 104* 132*  --    BUN  --    < > 17 36* 46*  --    CREATSERUM  --    < > 1.05* 1.30* 1.61*  --    CA  --    < > 10.7* 10.4 10.2  --    ALB 2.41*  --  2.4*  --   --   --    NA  --    < > 131* 136 136  --    K  --    < > 4.1 3.5 2.9* 3.9   CL  --    < > 104 103 104  --    CO2  --    < > 19.0* 23.0 22.0  --    ALKPHO  --   --  84  --   --   --    AST  --   --  36*  --   --   --    ALT  --   --  10  --   --   --    BILT  --   --  0.9  --   --   --    TP 5.1*  --  5.2*  --   --   --     < > = values in this interval not displayed.       ASSESSMENT/PLAN     Hospice care  -Patient admitted to inpatient hospice care.  -Starting comfort measures only  -Started on Dilaudid gtt. for pain and air hunger  -scopolamine patch for secretions  -Ativan as needed for agitation  - at bedside, discussed updates, plan of care and progress.      Plan of care discussed with patient and family at bedside.  Discussed with hospice team and RN. Decision made that pt needs inpatient hospice care for further management/monitoring.      Michael Chamberlain MD    This note was prepared using  Dragon Medical voice recognition dictation software. As a result errors may occur. When identified these errors have been corrected. While every attempt is made to correct errors during dictation discrepancies may still exist

## 2024-10-04 NOTE — PLAN OF CARE
Patient is Lethargic and answers to name. On room air. VSS. Bedbound. PCA for pain. Voiding via anaya. Skin tear on sacrum covered with aquacel.  at bedside overnight. Call light within reach; safety precautions in place.   Problem: COPING  Goal: Pt/Family able to verbalize concerns and demonstrate effective coping strategies  Description: INTERVENTIONS:  - Assist patient/family to identify coping skills, available support systems and cultural and spiritual values  - Provide emotional support, including active listening and acknowledgement of concerns of patient and caregivers  - Reduce environmental stimuli, as able  - Instruct patient/family in relaxation techniques, as appropriate  - Assess for spiritual and psychosocial needs and initiate Spiritual Care or Behavioral Health consult as needed  Outcome: Progressing     Problem: DEATH & DYING  Goal: Pt/Family communicate acceptance of impending death and feel psychological comfort and peace  Description: INTERVENTIONS:  - Assess patient/family anxiety and grief process related to end of life issues  - Provide emotional and spiritual support  - Provide information about the patient’s health status with consideration of family and cultural values  - Communicate willingness to discuss death and facilitate grief process  with patient/family as appropriate  - Emphasize sustaining relationships within family system and community, or conrad/spiritual traditions  - Initiate Spiritual Care consult as needed  Outcome: Progressing     Problem: DECISION MAKING  Goal: Pt/Family able to effectively weigh alternatives and participate in decision making related to treatment and care  Description: INTERVENTIONS:  - Determine when there are differences between patient's view, family's view, and healthcare provider's view of condition  - Facilitate patient and family articulation of goals for care  - Help patient and family identify pros/cons of alternative solutions  - Provide  information as requested by patient/family  - Respect patient/family right to receive or not to receive information  - Serve as a liaison between patient and family and health care team  - Initiate Consults from Ethics, Palliative Care or initiate Family Care Conference as is appropriate  Outcome: Progressing     Problem: SPIRITUAL CARE  Goal: Pt/Family able to move forward in process of forgiving self, others and/or higher power  Description: INTERVENTIONS:  - Assist patient to overcome blocks to healing by use of spiritual practices (prayer, meditation, guided imagery, reiki, breath work, etc.)  - De-myth guilt and help patient/family identify possible irrational spiritual/cultural beliefs and values  - Explore possibilities of making amends & reconciliation with self, others, and/or a greater power  - Guide patient/family in identifying painful feelings of guilt  - Help patient explore and identify spiritual beliefs, cultural understandings or values that may help or hinder letting go of issue  - Help patient explore feelings of anger, bitterness, resentment  - Help patient/family identify and examine the situation in which these feelings are experienced  - Help patient/family identify destructive displacement of feelings onto other individuals  - Invite use of sacraments/rituals/ceremonies as appropriate (e.g. - confession, anointing, smudging)  -. Refer patient to formal counseling and/or to conrad community for further support work  Outcome: Progressing

## 2024-10-04 NOTE — PLAN OF CARE
is at bedside during the majority of day. He is tearful. Active and therapeutic listening provided. He enjoys talking about memories he shares with Julissa.  left later in the evening to go home and get sleep and rest. Reassessment this afternoon revealed patient having increased secretions. Adjusted patient's arm and patient awoke and appeared uncomfortable, wincing in pain, and sighing aloud. Gave IV lasix for secretions, requested atropine gtts from pharmacy for further secretion management. Also administered IV ativan and increased dilaudid gtt to 0.6mg/hr for comfort. Ice packs placed under BUE. Oral care provided. Davidson to gravity. Comfort care and EOL measures in place and maintained.       Problem: PAIN - ADULT  Goal: Verbalizes/displays adequate comfort level or patient's stated pain goal  Description: INTERVENTIONS:  - Encourage pt to monitor pain and request assistance  - Assess pain using appropriate pain scale  - Administer analgesics based on type and severity of pain and evaluate response  - Implement non-pharmacological measures as appropriate and evaluate response  - Consider cultural and social influences on pain and pain management  - Manage/alleviate anxiety  - Utilize distraction and/or relaxation techniques  - Monitor for opioid side effects  - Notify MD/LIP if interventions unsuccessful or patient reports new pain  - Anticipate increased pain with activity and pre-medicate as appropriate  Outcome: Progressing     Problem: RISK FOR INFECTION - ADULT  Goal: Absence of fever/infection during anticipated neutropenic period  Description: INTERVENTIONS  - Monitor WBC  - Administer growth factors as ordered  - Implement neutropenic guidelines  Outcome: Progressing     Problem: COPING  Goal: Pt/Family able to verbalize concerns and demonstrate effective coping strategies  Description: INTERVENTIONS:  - Assist patient/family to identify coping skills, available support systems and  cultural and spiritual values  - Provide emotional support, including active listening and acknowledgement of concerns of patient and caregivers  - Reduce environmental stimuli, as able  - Instruct patient/family in relaxation techniques, as appropriate  - Assess for spiritual and psychosocial needs and initiate Spiritual Care or Behavioral Health consult as needed  Outcome: Progressing     Problem: DEATH & DYING  Goal: Pt/Family communicate acceptance of impending death and feel psychological comfort and peace  Description: INTERVENTIONS:  - Assess patient/family anxiety and grief process related to end of life issues  - Provide emotional and spiritual support  - Provide information about the patient’s health status with consideration of family and cultural values  - Communicate willingness to discuss death and facilitate grief process  with patient/family as appropriate  - Emphasize sustaining relationships within family system and community, or conrad/spiritual traditions  - Initiate Spiritual Care consult as needed  Outcome: Progressing     Problem: DECISION MAKING  Goal: Pt/Family able to effectively weigh alternatives and participate in decision making related to treatment and care  Description: INTERVENTIONS:  - Determine when there are differences between patient's view, family's view, and healthcare provider's view of condition  - Facilitate patient and family articulation of goals for care  - Help patient and family identify pros/cons of alternative solutions  - Provide information as requested by patient/family  - Respect patient/family right to receive or not to receive information  - Serve as a liaison between patient and family and health care team  - Initiate Consults from Ethics, Palliative Care or initiate Family Care Conference as is appropriate  Outcome: Progressing     Problem: SPIRITUAL CARE  Goal: Pt/Family able to move forward in process of forgiving self, others and/or higher  power  Description: INTERVENTIONS:  - Assist patient to overcome blocks to healing by use of spiritual practices (prayer, meditation, guided imagery, reiki, breath work, etc.)  - De-myth guilt and help patient/family identify possible irrational spiritual/cultural beliefs and values  - Explore possibilities of making amends & reconciliation with self, others, and/or a greater power  - Guide patient/family in identifying painful feelings of guilt  - Help patient explore and identify spiritual beliefs, cultural understandings or values that may help or hinder letting go of issue  - Help patient explore feelings of anger, bitterness, resentment  - Help patient/family identify and examine the situation in which these feelings are experienced  - Help patient/family identify destructive displacement of feelings onto other individuals  - Invite use of sacraments/rituals/ceremonies as appropriate (e.g. - confession, anointing, smudging)  -. Refer patient to formal counseling and/or to conrad community for further support work  Outcome: Progressing  Goal: Pt feels balance and connection with higher power that empowers the self during times of loss, guilt and fear  Description: INTERVENTIONS:  - Create safety for patient through empathic presence and non-judgmental listening  - Encourage patient to explore his/her values, beliefs and/or spiritual images and practices  - Encourage use of breath work, imagery, meditation, relaxation, reiki to ease distress and provide healing  - Encourage use of cultural and spiritual celebrations and rituals  - Facilitate discussion that helps patient sort out spiritual concerns  - Help patient identify where meaning/hope/comfort & strength are in his/her life  - Refer patient to conrad community for assistance, as appropriate  - Respond to patient/family need for prayer/ritual/sacrament/ceremony  Outcome: Progressing

## 2024-10-05 NOTE — PLAN OF CARE
Pt is minimally responsive. Clenches down with oral care. PRN lasix and scheduled robinul given for secretions. Davidson draining to gravity. Dilaudid gtt maintained. Bed locked and in lowest position.  at bedside, support offered.   Problem: PAIN - ADULT  Goal: Verbalizes/displays adequate comfort level or patient's stated pain goal  Description: INTERVENTIONS:  - Encourage pt to monitor pain and request assistance  - Assess pain using appropriate pain scale  - Administer analgesics based on type and severity of pain and evaluate response  - Implement non-pharmacological measures as appropriate and evaluate response  - Consider cultural and social influences on pain and pain management  - Manage/alleviate anxiety  - Utilize distraction and/or relaxation techniques  - Monitor for opioid side effects  - Notify MD/LIP if interventions unsuccessful or patient reports new pain  - Anticipate increased pain with activity and pre-medicate as appropriate  Outcome: Progressing     Problem: RISK FOR INFECTION - ADULT  Goal: Absence of fever/infection during anticipated neutropenic period  Description: INTERVENTIONS  - Monitor WBC  - Administer growth factors as ordered  - Implement neutropenic guidelines  Outcome: Progressing     Problem: Patient/Family Goals  Goal: Patient/Family Long Term Goal  Description: Patient's Long Term Goal: =    Interventions:  - =  - See additional Care Plan goals for specific interventions  Outcome: Progressing  Goal: Patient/Family Short Term Goal  Description: Patient's Short Term Goal:     Interventions:   -   - See additional Care Plan goals for specific interventions  Outcome: Progressing     Problem: COPING  Goal: Pt/Family able to verbalize concerns and demonstrate effective coping strategies  Description: INTERVENTIONS:  - Assist patient/family to identify coping skills, available support systems and cultural and spiritual values  - Provide emotional support, including active  listening and acknowledgement of concerns of patient and caregivers  - Reduce environmental stimuli, as able  - Instruct patient/family in relaxation techniques, as appropriate  - Assess for spiritual and psychosocial needs and initiate Spiritual Care or Behavioral Health consult as needed  Outcome: Progressing     Problem: DEATH & DYING  Goal: Pt/Family communicate acceptance of impending death and feel psychological comfort and peace  Description: INTERVENTIONS:  - Assess patient/family anxiety and grief process related to end of life issues  - Provide emotional and spiritual support  - Provide information about the patient’s health status with consideration of family and cultural values  - Communicate willingness to discuss death and facilitate grief process  with patient/family as appropriate  - Emphasize sustaining relationships within family system and community, or conrad/spiritual traditions  - Initiate Spiritual Care consult as needed  Outcome: Progressing     Problem: DECISION MAKING  Goal: Pt/Family able to effectively weigh alternatives and participate in decision making related to treatment and care  Description: INTERVENTIONS:  - Determine when there are differences between patient's view, family's view, and healthcare provider's view of condition  - Facilitate patient and family articulation of goals for care  - Help patient and family identify pros/cons of alternative solutions  - Provide information as requested by patient/family  - Respect patient/family right to receive or not to receive information  - Serve as a liaison between patient and family and health care team  - Initiate Consults from Ethics, Palliative Care or initiate Family Care Conference as is appropriate  Outcome: Progressing     Problem: SPIRITUAL CARE  Goal: Pt/Family able to move forward in process of forgiving self, others and/or higher power  Description: INTERVENTIONS:  - Assist patient to overcome blocks to healing by use of  spiritual practices (prayer, meditation, guided imagery, reiki, breath work, etc.)  - De-myth guilt and help patient/family identify possible irrational spiritual/cultural beliefs and values  - Explore possibilities of making amends & reconciliation with self, others, and/or a greater power  - Guide patient/family in identifying painful feelings of guilt  - Help patient explore and identify spiritual beliefs, cultural understandings or values that may help or hinder letting go of issue  - Help patient explore feelings of anger, bitterness, resentment  - Help patient/family identify and examine the situation in which these feelings are experienced  - Help patient/family identify destructive displacement of feelings onto other individuals  - Invite use of sacraments/rituals/ceremonies as appropriate (e.g. - confession, anointing, smudging)  -. Refer patient to formal counseling and/or to conrad community for further support work  Outcome: Progressing  Goal: Pt feels balance and connection with higher power that empowers the self during times of loss, guilt and fear  Description: INTERVENTIONS:  - Create safety for patient through empathic presence and non-judgmental listening  - Encourage patient to explore his/her values, beliefs and/or spiritual images and practices  - Encourage use of breath work, imagery, meditation, relaxation, reiki to ease distress and provide healing  - Encourage use of cultural and spiritual celebrations and rituals  - Facilitate discussion that helps patient sort out spiritual concerns  - Help patient identify where meaning/hope/comfort & strength are in his/her life  - Refer patient to conrad community for assistance, as appropriate  - Respond to patient/family need for prayer/ritual/sacrament/ceremony  Outcome: Progressing

## 2024-10-05 NOTE — HOSPICE RN NOTE
[facility] Inpatient Nursing Rounds:     Pt is visited with spouse Andres at bedside. Upon assessment pt is unresponsive to verbal and tactile stimulus. RR 20, unlabored. Lung sounds course,  diminished.  2L O2 via NC in place for comfort. Bowel sound hypoactive. Davidson catheter in place draining minimal zach colored urine. Skin cool to touch, no mottling present. Pulses palpable. PPS 10%.     Pt remains on Dilaudid drip, currently infusing at 0.6mg/hr.  Scheduled IV Robinul being given q4 hours. In the past 24 hours, pt has received 2 doses of PRN Atropine drops, 2 doses of PRN IV Lasix, and 1 dose of PRN IV Ativan.     Patient remains eligible for general inpatient hospice care for symptom management of dyspnea requiring frequent nursing assessments and interventions including titration of IV medications. POC discussed with Andres. [facility] will continue to support the patient and family.        ULI Pascual, RN   [facility] Transitional Nurse Liaison   [phone] (office) or [phone] (after hours)       1900 - Pt revisited. Pt remains unresponsive, no family present at bedside. RR 16, uneven, unlabored. Dilaudid gtt remains at 0.6mg/hr.

## 2024-10-05 NOTE — PROGRESS NOTES
[facility]  part of [facility]     Hospitalist Progress Note     Julissa Ferro Patient Status:  Inpatient    1945 MRN [id number]   Location [facility] 4W/SW/SE Attending Michael Chamberlain MD   Hosp Day # 2 PCP Jesus Kirkland MD     Chief Complaint: Shortness of breath    Subjective:     Patient seen lying in bed.   at bedside.  Patient remains fairly unresponsive.  Appears comfortable.    Objective:      Vital signs:  Vitals:    10/03/24 2100 10/03/24 2140 10/04/24 1203 10/04/24 1946   BP: (!) 88/55 135/61 116/59 (!) 86/56   BP Location:  Right arm Right arm Left arm   Pulse: 112 118 112 108   Resp: 19 18 15 15   Temp:   97.4 °F (36.3 °C)    TempSrc:   Temporal    SpO2: 97% 94% 90% 90%       Intake/Output Summary (Last 24 hours) at 10/5/2024 0929  Last data filed at 10/5/2024 0729  Gross per 24 hour   Intake 52.2 ml   Output 400 ml   Net -347.8 ml           Physical Exam:    GENERAL: Thin, ill-appearing female.   in no acute distress.  HEART: Tachycardia  LUNGS:  Air entry was decreased.        Diagnostic Data:    Labs:    Recent Labs   Lab 24  0516 10/01/24  0405 10/02/24  040   WBC 14.6* 6.8 4.1   HGB 8.9* 10.1* 10.4*   MCV 89.6 82.0 81.0   .0 413.0 481.0*       Recent Labs   Lab 24  0514 10/01/24  0405 10/02/24  0404 10/02/24  1604   GLU 73 104* 132*  --    BUN 17 36* 46*  --    CREATSERUM 1.05* 1.30* 1.61*  --    CA 10.7* 10.4 10.2  --    ALB 2.4*  --   --   --    * 136 136  --    K 4.1 3.5 2.9* 3.9    103 104  --    CO2 19.0* 23.0 22.0  --    ALKPHO 84  --   --   --    AST 36*  --   --   --    ALT 10  --   --   --    BILT 0.9  --   --   --    TP 5.2*  --   --   --            CrCl cannot be calculated (Unknown ideal weight.).    No results for input(s): \"PTP\", \"INR\" in the last 168 hours.         COVID-19  No results found for: \"COVID19\"    Pro-Calcitonin  Recent Labs   Lab 24  1219   PCT 0.27*       Cardiac  No results for  input(s): \"TROP\", \"PBNP\" in the last 168 hours.    Inflammatory Markers  Recent Labs   Lab 09/28/24  1543   DDIMER 2.80*       Culture:  No results found for this visit on 10/03/24.    XR CHEST AP PORTABLE  (CPT=71045)    Result Date: 10/3/2024  CONCLUSION:   Mild interstitial and alveolar edema, slightly increased.  Superimposed aspiration/pneumonia is a consideration.  Suspected trace bilateral pleural effusions.  Dobbhoff tube tip at the proximal stomach.  Recommend advancement if positioning is desired within the second portion of the duodenum.  Previously seen right basilar opacity no longer visualized.     Dictated by (CST): Ariane Muro MD on 10/03/2024 at 3:58 PM     Finalized by (CST): Ariane Muro MD on 10/03/2024 at 4:00 PM          XR CHEST AP PORTABLE  (CPT=71045)    Result Date: 10/3/2024  CONCLUSION:  1. There has been removal of the previously seen large-bore Dobbhoff tube.  2. Moderate left larger than right pleural effusions and associated basilar atelectasis, with or without superimposed pneumonia.  3. Redemonstration of a lobulated opacity at the right lung base. Continued surveillance is advised.   A preliminary report was issued by the [facility] Radiology teleradiology service. There are no major discrepancies.   Dictated by (CST): Alfredo Deal MD on 10/03/2024 at 7:41 AM     Finalized by (CST): Alfredo Deal MD on 10/03/2024 at 7:44 AM          MRI SPINE CERVICAL (CPT=72141)    Result Date: 10/2/2024  CONCLUSION:  1. Normal cervical and upper thoracic cord through T3. 2. Multilevel degenerative disc disease/spondylosis.  Disc osteophyte complex at C4-5 results in mild ventral cord contact and mild central narrowing.  No moderate or high-grade central narrowing. 3. Image degradation by patient related motion artifact.    Dictated by (CST): Joamr Andre MD on 10/02/2024 at 5:13 PM     Finalized by (CST): Jomar Andre MD on 10/02/2024 at 5:23 PM          MRI BRAIN  (CPT=70551)    Result Date: 10/2/2024  CONCLUSION:   1. No acute intracranial abnormality. 2. Moderate global parenchymal volume loss. 3. 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  4. Lesser incidental findings described above.    Dictated by (CST): Jay Jay Golden MD on 10/02/2024 at 2:58 PM     Finalized by (CST): Jay Jay Golden MD on 10/02/2024 at 3:06 PM                 Medications:    [START ON 10/6/2024] scopolamine  1 patch Transdermal Q72H    glycopyrrolate  0.4 mg Intravenous q4h       Assessment & Plan:        Hospice care  -Continue comfort measures only  -Continue on Dilaudid gtt. for pain and air hunger  -scopolamine patch for secretions  -Ativan as needed for agitation  - at bedside, discussed updates, plan of care and progress.      Plan of care discussed with patient's   at bedside . Discussed management with Rn     Quality:  CODE status: DNR comfort  Estimated date of discharge: TBD  Discharge is dependent on: clinical stability    Michael Chamberlain MD          This note was prepared using Dragon Medical voice recognition dictation software. As a result errors may occur. When identified these errors have been corrected. While every attempt is made to correct errors during dictation discrepancies may still exist

## 2024-10-05 NOTE — PLAN OF CARE
Patient is minimally responsive. On room air. VSS. Bedbound. PCA for pain. Atropine drops and robinul for secretions. Voiding via anaya. Skin tear on sacrum covered with aquacel.  at bedside overnight. Call light within reach; safety precautions in place.   Problem: Patient/Family Goals  Goal: Patient/Family Short Term Goal  Description: Patient's Short Term Goal:     Interventions:   -   - See additional Care Plan goals for specific interventions  Outcome: Progressing     Problem: COPING  Goal: Pt/Family able to verbalize concerns and demonstrate effective coping strategies  Description: INTERVENTIONS:  - Assist patient/family to identify coping skills, available support systems and cultural and spiritual values  - Provide emotional support, including active listening and acknowledgement of concerns of patient and caregivers  - Reduce environmental stimuli, as able  - Instruct patient/family in relaxation techniques, as appropriate  - Assess for spiritual and psychosocial needs and initiate Spiritual Care or Behavioral Health consult as needed  Outcome: Progressing     Problem: DEATH & DYING  Goal: Pt/Family communicate acceptance of impending death and feel psychological comfort and peace  Description: INTERVENTIONS:  - Assess patient/family anxiety and grief process related to end of life issues  - Provide emotional and spiritual support  - Provide information about the patient’s health status with consideration of family and cultural values  - Communicate willingness to discuss death and facilitate grief process  with patient/family as appropriate  - Emphasize sustaining relationships within family system and community, or conrad/spiritual traditions  - Initiate Spiritual Care consult as needed  Outcome: Progressing     Problem: DECISION MAKING  Goal: Pt/Family able to effectively weigh alternatives and participate in decision making related to treatment and care  Description: INTERVENTIONS:  - Determine  when there are differences between patient's view, family's view, and healthcare provider's view of condition  - Facilitate patient and family articulation of goals for care  - Help patient and family identify pros/cons of alternative solutions  - Provide information as requested by patient/family  - Respect patient/family right to receive or not to receive information  - Serve as a liaison between patient and family and health care team  - Initiate Consults from Ethics, Palliative Care or initiate Family Care Conference as is appropriate  Outcome: Progressing

## 2024-10-06 NOTE — PLAN OF CARE
Problem: PAIN - ADULT  Goal: Verbalizes/displays adequate comfort level or patient's stated pain goal  Description: INTERVENTIONS:  - Encourage pt to monitor pain and request assistance  - Assess pain using appropriate pain scale  - Administer analgesics based on type and severity of pain and evaluate response  - Implement non-pharmacological measures as appropriate and evaluate response  - Consider cultural and social influences on pain and pain management  - Manage/alleviate anxiety  - Utilize distraction and/or relaxation techniques  - Monitor for opioid side effects  - Notify MD/LIP if interventions unsuccessful or patient reports new pain  - Anticipate increased pain with activity and pre-medicate as appropriate  Outcome: Not Progressing     Problem: RISK FOR INFECTION - ADULT  Goal: Absence of fever/infection during anticipated neutropenic period  Description: INTERVENTIONS  - Monitor WBC  - Administer growth factors as ordered  - Implement neutropenic guidelines  Outcome: Not Progressing     Problem: Patient/Family Goals  Goal: Patient/Family Long Term Goal  Description: Patient's Long Term Goal:     Interventions:  -   - See additional Care Plan goals for specific interventions  Outcome: Not Progressing  Goal: Patient/Family Short Term Goal  Description: Patient's Short Term Goal:     Interventions:   -  - See additional Care Plan goals for specific interventions  Outcome: Not Progressing     Problem: COPING  Goal: Pt/Family able to verbalize concerns and demonstrate effective coping strategies  Description: INTERVENTIONS:  - Assist patient/family to identify coping skills, available support systems and cultural and spiritual values  - Provide emotional support, including active listening and acknowledgement of concerns of patient and caregivers  - Reduce environmental stimuli, as able  - Instruct patient/family in relaxation techniques, as appropriate  - Assess for spiritual and psychosocial needs and  initiate Spiritual Care or Behavioral Health consult as needed  Outcome: Not Progressing     Problem: DEATH & DYING  Goal: Pt/Family communicate acceptance of impending death and feel psychological comfort and peace  Description: INTERVENTIONS:  - Assess patient/family anxiety and grief process related to end of life issues  - Provide emotional and spiritual support  - Provide information about the patient’s health status with consideration of family and cultural values  - Communicate willingness to discuss death and facilitate grief process  with patient/family as appropriate  - Emphasize sustaining relationships within family system and community, or conrad/spiritual traditions  - Initiate Spiritual Care consult as needed  Outcome: Not Progressing     Problem: DECISION MAKING  Goal: Pt/Family able to effectively weigh alternatives and participate in decision making related to treatment and care  Description: INTERVENTIONS:  - Determine when there are differences between patient's view, family's view, and healthcare provider's view of condition  - Facilitate patient and family articulation of goals for care  - Help patient and family identify pros/cons of alternative solutions  - Provide information as requested by patient/family  - Respect patient/family right to receive or not to receive information  - Serve as a liaison between patient and family and health care team  - Initiate Consults from Ethics, Palliative Care or initiate Family Care Conference as is appropriate  Outcome: Not Progressing     Problem: SPIRITUAL CARE  Goal: Pt/Family able to move forward in process of forgiving self, others and/or higher power  Description: INTERVENTIONS:  - Assist patient to overcome blocks to healing by use of spiritual practices (prayer, meditation, guided imagery, reiki, breath work, etc.)  - De-myth guilt and help patient/family identify possible irrational spiritual/cultural beliefs and values  - Explore possibilities  of making amends & reconciliation with self, others, and/or a greater power  - Guide patient/family in identifying painful feelings of guilt  - Help patient explore and identify spiritual beliefs, cultural understandings or values that may help or hinder letting go of issue  - Help patient explore feelings of anger, bitterness, resentment  - Help patient/family identify and examine the situation in which these feelings are experienced  - Help patient/family identify destructive displacement of feelings onto other individuals  - Invite use of sacraments/rituals/ceremonies as appropriate (e.g. - confession, anointing, smudging)  -. Refer patient to formal counseling and/or to conrad [facility] for further support work  Outcome: Not Progressing  Goal: Pt feels balance and connection with higher power that empowers the self during times of loss, guilt and fear  Description: INTERVENTIONS:  - Create safety for patient through empathic presence and non-judgmental listening  - Encourage patient to explore his/her values, beliefs and/or spiritual images and practices  - Encourage use of breath work, imagery, meditation, relaxation, reiki to ease distress and provide healing  - Encourage use of cultural and spiritual celebrations and rituals  - Facilitate discussion that helps patient sort out spiritual concerns  - Help patient identify where meaning/hope/comfort & strength are in his/her life  - Refer patient to conrad [facility] for assistance, as appropriate  - Respond to patient/family need for prayer/ritual/sacrament/ceremony  Outcome: Not Progressing     Responsive to tactile stimulation, will open eyes intermittently. Dilaudid PCA continued 0.6 ml/hr. Scheduled robinul and prn lasix given for secretions. 2L o2 via nasal cannula in place. Comfort measures reinforced, repositioned and pillow support provided. Davidson cath intact, minimal zach output.  at bedside updated regarding current plan of care nursing  interventions

## 2024-10-06 NOTE — PLAN OF CARE
Problem: PAIN - ADULT  Goal: Verbalizes/displays adequate comfort level or patient's stated pain goal  Description: INTERVENTIONS:  - Encourage pt to monitor pain and request assistance  - Assess pain using appropriate pain scale  - Administer analgesics based on type and severity of pain and evaluate response  - Implement non-pharmacological measures as appropriate and evaluate response  - Consider cultural and social influences on pain and pain management  - Manage/alleviate anxiety  - Utilize distraction and/or relaxation techniques  - Monitor for opioid side effects  - Notify MD/LIP if interventions unsuccessful or patient reports new pain  - Anticipate increased pain with activity and pre-medicate as appropriate  Outcome: Progressing     Problem: RISK FOR INFECTION - ADULT  Goal: Absence of fever/infection during anticipated neutropenic period  Description: INTERVENTIONS  - Monitor WBC  - Administer growth factors as ordered  - Implement neutropenic guidelines  Outcome: Progressing     Problem: COPING  Goal: Pt/Family able to verbalize concerns and demonstrate effective coping strategies  Description: INTERVENTIONS:  - Assist patient/family to identify coping skills, available support systems and cultural and spiritual values  - Provide emotional support, including active listening and acknowledgement of concerns of patient and caregivers  - Reduce environmental stimuli, as able  - Instruct patient/family in relaxation techniques, as appropriate  - Assess for spiritual and psychosocial needs and initiate Spiritual Care or Behavioral Health consult as needed  Outcome: Progressing     Problem: DEATH & DYING  Goal: Pt/Family communicate acceptance of impending death and feel psychological comfort and peace  Description: INTERVENTIONS:  - Assess patient/family anxiety and grief process related to end of life issues  - Provide emotional and spiritual support  - Provide information about the patient’s health status  with consideration of family and cultural values  - Communicate willingness to discuss death and facilitate grief process  with patient/family as appropriate  - Emphasize sustaining relationships within family system and community, or conrad/spiritual traditions  - Initiate Spiritual Care consult as needed  Outcome: Progressing     Problem: DECISION MAKING  Goal: Pt/Family able to effectively weigh alternatives and participate in decision making related to treatment and care  Description: INTERVENTIONS:  - Determine when there are differences between patient's view, family's view, and healthcare provider's view of condition  - Facilitate patient and family articulation of goals for care  - Help patient and family identify pros/cons of alternative solutions  - Provide information as requested by patient/family  - Respect patient/family right to receive or not to receive information  - Serve as a liaison between patient and family and health care team  - Initiate Consults from Ethics, Palliative Care or initiate Family Care Conference as is appropriate  Outcome: Progressing     Problem: SPIRITUAL CARE  Goal: Pt/Family able to move forward in process of forgiving self, others and/or higher power  Description: INTERVENTIONS:  - Assist patient to overcome blocks to healing by use of spiritual practices (prayer, meditation, guided imagery, reiki, breath work, etc.)  - De-myth guilt and help patient/family identify possible irrational spiritual/cultural beliefs and values  - Explore possibilities of making amends & reconciliation with self, others, and/or a greater power  - Guide patient/family in identifying painful feelings of guilt  - Help patient explore and identify spiritual beliefs, cultural understandings or values that may help or hinder letting go of issue  - Help patient explore feelings of anger, bitterness, resentment  - Help patient/family identify and examine the situation in which these feelings are  experienced  - Help patient/family identify destructive displacement of feelings onto other individuals  - Invite use of sacraments/rituals/ceremonies as appropriate (e.g. - confession, anointing, smudging)  -. Refer patient to formal counseling and/or to conrad community for further support work  Outcome: Progressing  Goal: Pt feels balance and connection with higher power that empowers the self during times of loss, guilt and fear  Description: INTERVENTIONS:  - Create safety for patient through empathic presence and non-judgmental listening  - Encourage patient to explore his/her values, beliefs and/or spiritual images and practices  - Encourage use of breath work, imagery, meditation, relaxation, reiki to ease distress and provide healing  - Encourage use of cultural and spiritual celebrations and rituals  - Facilitate discussion that helps patient sort out spiritual concerns  - Help patient identify where meaning/hope/comfort & strength are in his/her life  - Refer patient to conrad community for assistance, as appropriate  - Respond to patient/family need for prayer/ritual/sacrament/ceremony  Outcome: Progressing     Patient is minimally responsive, on 2L O2 nasal cannula, x2 max assist, repositioned as needed, anaya in place, x1 smear BM overnight, Dilaudid gtt continued at 0.6mg/hr, received scheduled Robinul and PRN Atropine/Lasix for increased secretions. Frequent rounding done, safety measures in place, comfort measures continued.

## 2024-10-06 NOTE — PROGRESS NOTES
[facility]  part of [facility]     Hospitalist Progress Note     Julissa Ferro Patient Status:  Inpatient    1945 MRN [id number]   Location [facility] 4W/SW/SE Attending Michael Chamberlain MD   Hosp Day # 3 PCP Jesus Kirkland MD     Chief Complaint: Shortness of breath    Subjective:     Patient seen lying in bed.  No family at bedside.  Patient remains unresponsive.  Appears comfortable.     Objective:      Vital signs:  Vitals:    10/04/24 1203 10/04/24 1946 10/05/24 1155 10/05/24 2144   BP: 116/59 (!) 86/56 (!) 84/57 (!) 81/46   BP Location: Right arm Left arm Left arm Left arm   Pulse: 112 108 77    Resp: 15 15 14    Temp: 97.4 °F (36.3 °C)  97.2 °F (36.2 °C)    TempSrc: Temporal  Axillary    SpO2: 90% 90% (!) 71%        Intake/Output Summary (Last 24 hours) at 10/6/2024 1317  Last data filed at 10/6/2024 0807  Gross per 24 hour   Intake 71.15 ml   Output 515 ml   Net -443.85 ml           Physical Exam:    GENERAL: Thin, ill-appearing female.   in no acute distress.  HEART: Tachycardia  LUNGS: Slow, agonal breathing at times.      Diagnostic Data:    Labs:    Recent Labs   Lab 24  0516 10/01/24  0405 10/02/24  040   WBC 14.6* 6.8 4.1   HGB 8.9* 10.1* 10.4*   MCV 89.6 82.0 81.0   .0 413.0 481.0*       Recent Labs   Lab 24  0514 10/01/24  0405 10/02/24  0404 10/02/24  1604   GLU 73 104* 132*  --    BUN 17 36* 46*  --    CREATSERUM 1.05* 1.30* 1.61*  --    CA 10.7* 10.4 10.2  --    ALB 2.4*  --   --   --    * 136 136  --    K 4.1 3.5 2.9* 3.9    103 104  --    CO2 19.0* 23.0 22.0  --    ALKPHO 84  --   --   --    AST 36*  --   --   --    ALT 10  --   --   --    BILT 0.9  --   --   --    TP 5.2*  --   --   --            CrCl cannot be calculated (Unknown ideal weight.).    No results for input(s): \"PTP\", \"INR\" in the last 168 hours.         COVID-19  No results found for: \"COVID19\"    Pro-Calcitonin  No results for input(s): \"PCT\" in the last  168 hours.      Cardiac  No results for input(s): \"TROP\", \"PBNP\" in the last 168 hours.    Inflammatory Markers  No results for input(s): \"CRP\", \"CHERELLE\", \"LDH\", \"DDIMER\" in the last 168 hours.      Culture:  No results found for this visit on 10/03/24.    No results found.          Medications:    scopolamine  1 patch Transdermal Q72H    glycopyrrolate  0.4 mg Intravenous q4h       Assessment & Plan:        Hospice care  -Continue comfort measures only  -Continue on Dilaudid gtt. for pain and air hunger  -scopolamine patch for secretions  -Ativan as needed for agitation    Discussed management with Rn     Quality:  CODE status: DNR comfort  Estimated date of discharge: TBD  Discharge is dependent on: clinical stability    Michael Chamberlain MD          This note was prepared using Dragon Medical voice recognition dictation software. As a result errors may occur. When identified these errors have been corrected. While every attempt is made to correct errors during dictation discrepancies may still exist

## 2024-10-07 NOTE — SIGNIFICANT EVENT
Pt  at [facility]. Resusscitation not attempted as pt was DNAR/Comfort.    Time of Death: 46    Family Notified: YES    MD: Marta Dyer    [facility] notified YES (Rianna Whitesidemariaelena, case number [id number])     contacted if applicable (get name and badge number) N/A

## 2024-10-07 NOTE — HOSPICE RN NOTE
[facility] In[facility] Nursing Rounds:     Patient in bed and unresponsive. Spouse, Andres, at bedside. Facial features relaxed with head lying to left side. Breathing with open mouth, labored, shallow, irregular, RR 14, diminished with crackles in all fields, on 2 L/min O2 NC. Copious airway secretions. Skin pale, cool, dry. Davidson in place draining clear, zach urine. LBM: 10/5/24. Bowel sounds hypoactive in all quadrants.     Hydromorphone gtt remains at 0.6 mg/hr for dyspnea. Glycopyrrolate 0.4 mg IVP every 4 hours for airway secretions. Scopolamine patch TD every 72 hours for airway secretions, in place behind right ear. In the past 24 hours, PRN medications given include: atropine 2 drops SL x2, furosemide 40 mg IVP x3.     PPS: 10%    Patient remains eligible for general inpatient hospice care for symptom management of dyspnea and airway secretions requiring frequent nursing assessments and interventions including titration of IV medications per Dr. Chamberlain and Dr. Fuller. POC discussed with the patient's spouse and Vasquez Cedeño RN at bedside. Both in agreement. [facility] will continue to support the patient and family.    JULIA SortoN, RN, [facility]  [facility] RN Liaison  [phone] (After-hours)    1930 - Patient revisited. Remains unresponsive with no family at bedside now. Hydromorphone gtt increased to 0.8 mg/hr for increased dyspnea.

## 2024-10-08 NOTE — DISCHARGE SUMMARY
[facility]  part of [facility]    Discharge Summary    Julissa Ferro Patient Status:  Inpatient    1945 MRN [id number]   Location [facility] 4W/SW/SE Attending No att. providers found   Hosp Day # 4 PCP Jesus Kirkland MD     Date of Admission: 10/3/2024     Date of Discharge: 10/08/24      Lace+ Score: 62  59-90 High Risk  29-58 Medium Risk  0-28   Low Risk.    TCM Follow-Up Recommendation:  LACE > 58: High Risk of readmission after discharge from the hospital.    DISCHARGE DX: Active Problems:    Respiratory failure with hypoxia (HCC)             Hospital Course:79 year oldfemale who was initially admitted due to shortness of breath and generalized weakness.  Patient was being treated for possible aspiration pneumonia along with failure to thrive.  Patient's respiratory status continued to decline.  After further discussion with hospice team, patient's  made decision to pursue comfort measures only.  Patient was discharged and readmitted under inpatient hospice care.  Pt was treated with comfort care only. Her medical condition continued to decline. She passed  on 10/7/24 at 0046        CULTURE:   No results found for this visit on 10/03/24.    IMAGING STUDIES: SOME MAY NEED FOLLOW UP WITH PCP   XR CHEST AP PORTABLE  (CPT=71045)    Result Date: 10/3/2024  CONCLUSION:   Mild interstitial and alveolar edema, slightly increased.  Superimposed aspiration/pneumonia is a consideration.  Suspected trace bilateral pleural effusions.  Dobbhoff tube tip at the proximal stomach.  Recommend advancement if positioning is desired within the second portion of the duodenum.  Previously seen right basilar opacity no longer visualized.     Dictated by (CST): Ariane Muro MD on 10/03/2024 at 3:58 PM     Finalized by (CST): Ariane Muro MD on 10/03/2024 at 4:00 PM          XR CHEST AP PORTABLE  (CPT=71045)    Result Date: 10/3/2024  CONCLUSION:  1. There has been removal of the  previously seen large-bore Dobbhoff tube.  2. Moderate left larger than right pleural effusions and associated basilar atelectasis, with or without superimposed pneumonia.  3. Redemonstration of a lobulated opacity at the right lung base. Continued surveillance is advised.   A preliminary report was issued by the [facility] teleradiology service. There are no major discrepancies.   Dictated by (CST): Alfredo Deal MD on 10/03/2024 at 7:41 AM     Finalized by (CST): Alfredo Deal MD on 10/03/2024 at 7:44 AM          MRI SPINE CERVICAL (CPT=72141)    Result Date: 10/2/2024  CONCLUSION:  1. Normal cervical and upper thoracic cord through T3. 2. Multilevel degenerative disc disease/spondylosis.  Disc osteophyte complex at C4-5 results in mild ventral cord contact and mild central narrowing.  No moderate or high-grade central narrowing. 3. Image degradation by patient related motion artifact.    Dictated by (CST): Jomar Andre MD on 10/02/2024 at 5:13 PM     Finalized by (CST): Jomar Andre MD on 10/02/2024 at 5:23 PM          MRI BRAIN (CPT=70551)    Result Date: 10/2/2024  CONCLUSION:   1. No acute intracranial abnormality. 2. Moderate global parenchymal volume loss. 3. 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  4. Lesser incidental findings described above.    Dictated by (CST): Jay Jay Golden MD on 10/02/2024 at 2:58 PM     Finalized by (CST): Jay Jay Golden MD on 10/02/2024 at 3:06 PM          XR CHEST AP PORTABLE  (CPT=71045)    Result Date: 10/2/2024  CONCLUSION:   New ovoid opacity at the right lung base.  Could represent round pneumonia or atelectasis.  Recommend attention on follow-up.  Small left pleural effusion with left basilar opacity, unchanged.    Dictated by (CST): Ariane Muro MD on 10/02/2024 at 7:46 AM     Finalized by (CST): Ariane Muro MD on 10/02/2024 at 7:48 AM          XR CHEST  AP PORTABLE  (CPT=71045)    Result Date: 10/1/2024  CONCLUSION:  1. Feeding tube tip in proximal gastric antrum. 2. Bibasilar atelectasis and or pneumonia.  Improved aeration right lung base.    Dictated by (CST): Jomar Andre MD on 10/01/2024 at 4:54 PM     Finalized by (CST): Jomar Andre MD on 10/01/2024 at 4:55 PM          US VENOUS DOPPLER LEG BILAT - DIAG IMG (CPT=93970)    Result Date: 9/30/2024  CONCLUSION: Normal examination.     Dictated by (CST): Jordan Dawn MD on 9/30/2024 at 2:30 PM     Finalized by (CST): Jordan Dawn MD on 9/30/2024 at 2:31 PM          XR CHEST AP PORTABLE  (CPT=71045)    Result Date: 9/30/2024  CONCLUSION:  1. Borderline cardiomegaly.  Tortuous aorta. 2. Bibasilar parenchymal abnormality and/or atelectatic changes, worse on the left. 3. Small bilateral effusions, greater on left.  Improved aeration right lung base..    Dictated by (CST): Adonis Issa MD on 9/30/2024 at 8:04 AM     Finalized by (CST): Adonis Issa MD on 9/30/2024 at 8:05 AM          CT CHEST PE AORTA (IV ONLY) (CPT=71260)    Result Date: 9/29/2024  CONCLUSION:   No evidence of pulmonary embolism to the bilateral segmental arterial level.  Small bilateral pleural effusions.  Near complete bilateral lower lobe consolidation which could reflect atelectasis with or without superimposed pneumonia.  Mild patchy ground-glass opacity in the superior segment right lower lobe and right upper lobe with mild reticulonodular opacity which may be infectious/inflammatory in etiology.  Small pericardial effusion.  Lesser incidental findings as above.    Dictated by (CST): Feng Carranza MD on 9/29/2024 at 1:10 PM     Finalized by (CST): Feng Carranza MD on 9/29/2024 at 1:16 PM          XR CHEST AP PORTABLE  (CPT=71045)    Result Date: 9/29/2024  CONCLUSION:  Right greater than left bilateral basilar opacities increased on the right.  On the right, this may reflect atelectasis with or without superimposed  pneumonia.  Left basilar opacity may reflect a combination of small effusion and parenchymal opacity.       Dictated by (CST): Feng Carranza MD on 9/29/2024 at 11:20 AM     Finalized by (CST): Feng Carranza MD on 9/29/2024 at 11:22 AM          XR CHEST AP PORTABLE  (CPT=71045)    Result Date: 9/28/2024  CONCLUSION:   Left greater than right basilar opacities, which may reflect atelectasis with or without superimposed pneumonia.  Mild elevation of the right hemidiaphragm.    Dictated by (CST): Jay Jay Golden MD on 9/28/2024 at 4:11 PM     Finalized by (CST): Jay Jay Golden MD on 9/28/2024 at 4:13 PM          CT CHEST+ABDOMEN+PELVIS(CPT=71250/11090)    Result Date: 9/27/2024  CONCLUSION:  1. No evidence of a primary malignancy. 2. Small amount of ascites.  Mild body wall edema. 3. Small pericardial effusion. 4. Marked distention of urinary bladder associated with mild to moderate bilateral hydronephrosis and mild hydroureter.  Findings may be related to reflux. 5. Nonobstructing bilateral renal calculi.  No ureteral calculus. 6. Colonic diverticulosis.  Moderate amount of stool in the colon. 7. Atherosclerotic vascular calcification including coronary artery calcification.    Dictated by (CST): Jomar Andre MD on 9/27/2024 at 8:39 PM     Finalized by (CST): Jomar Andre MD on 9/27/2024 at 8:58 PM           LABS :     Lab Results   Component Value Date    WBC 4.1 10/02/2024    HGB 10.4 (L) 10/02/2024    HCT 31.2 (L) 10/02/2024    .0 (H) 10/02/2024    CREATSERUM 1.61 (H) 10/02/2024    BUN 46 (H) 10/02/2024     10/02/2024    K 3.9 10/02/2024     10/02/2024    CO2 22.0 10/02/2024     (H) 10/02/2024    CA 10.2 10/02/2024    ALB 2.4 (L) 09/30/2024    ALKPHO 84 09/30/2024    BILT 0.9 09/30/2024    TP 5.2 (L) 09/30/2024    AST 36 (H) 09/30/2024    ALT 10 09/30/2024    T4F 0.9 10/02/2024    TSH 1.720 10/02/2024    DDIMER 2.80 (H) 09/28/2024    ESRML 25 10/02/2024    CRP 4.60 (H)  2024    MG 2.7 (H) 10/02/2024    PHOS 4.2 10/02/2024    CK 93 10/02/2024    B12 582 2024       Recent Labs   Lab 10/02/24  0404   RBC 3.85   HGB 10.4*   HCT 31.2*   MCV 81.0   MCH 27.0   MCHC 33.3   RDW 17.0*   NEPRELIM 3.52   WBC 4.1   .0*     Recent Labs   Lab 10/02/24  0404 10/02/24  1604   *  --    BUN 46*  --    CREATSERUM 1.61*  --    CA 10.2  --      --    K 2.9* 3.9     --    CO2 22.0  --      No results found for: \"PT\", \"INR\"    Disposition:     Condition at Discharge:     Michael Chamberlain MD

## 2024-10-10 LAB — ANTI-HMGCR AB: <20 UNITS

## 2024-10-12 LAB — PTH-RELATED PEPTIDE: <2 PMOL/L

## 2024-10-20 LAB
ANTI-EJ: NEGATIVE
ANTI-JO-1: <20 UNITS
ANTI-KU: NEGATIVE
ANTI-MDA-5: <20 UNITS
ANTI-MI-2 AB: NEGATIVE
ANTI-NXP-2: <20 UNITS
ANTI-OJ: NEGATIVE
ANTI-PL-12: NEGATIVE
ANTI-PL-7: NEGATIVE
ANTI-PM/SCL100: <20 UNITS
ANTI-SAE1: <20 UNITS
ANTI-SRP AB: NEGATIVE
ANTI-SSA 52KD IGG: <20 UNITS
ANTI-TIF-1GAMMA: <20 UNITS
ANTI-U1 RNP: <20 UNITS
ANTI-U2 RNP: NEGATIVE
ANTI-U3 RNP: NEGATIVE

## 2024-10-23 LAB
ACHR BINDING ABS: <0.03 NMOL/L
ACHR BLOCKING ABS: 14 %
ACHR-MODULATING AB: 0 %
MUSK ABS, SERUM: <1 U/ML
STRIATIONAL ABS, SERUM: NEGATIVE

## 2024-10-30 LAB
AGNA-1: NEGATIVE
AMPA-R1 ANTIBODY: NEGATIVE
AMPA-R2 ANTIBODY: NEGATIVE
AMPHIPHYSIN ANTIBODY: NEGATIVE
ANTI-HU AB: NEGATIVE
ANTI-RI AB: NEGATIVE
ANTI-YO AB: NEGATIVE
ANTINERUONAL NUCLEAR AB TYPE 3: NEGATIVE
AQUAPORIN 4 ANTIBODY: NEGATIVE
CASPR2 ANTIBODY,CELL-BASED IFA: NEGATIVE
CRMP-5 IGG: NEGATIVE
DNER ANTIBODY: NEGATIVE
DPPX ANTIBODY: NEGATIVE
GABA-B-R ANTIBODY: NEGATIVE
GAD65 ANTIBODY: NEGATIVE
IGLON5 ANTIBODY: NEGATIVE
INTERPRETATION: NEGATIVE
ITPR1 ANTIBODY: NEGATIVE
LGI1 ANTIBODY, CELL-BASED IFA: NEGATIVE
MA2/TA ANTIBODY: NEGATIVE
MGLUR1 ANTIBODY: NEGATIVE
NMDA-R ANTIBODY: NEGATIVE
PURKINJE CELL CYTO AB TYPE 2: NEGATIVE
PURKINJE CELL CYTO AB TYPE TR: NEGATIVE
VGCC ANTIBODY: <1 PMOL/L
ZIC4 ANTIBODY: NEGATIVE

## (undated) DEVICE — SNARE CAPTIFLEX MICRO-OVL OLY

## (undated) DEVICE — REM POLYHESIVE ADULT PATIENT RETURN ELECTRODE: Brand: VALLEYLAB

## (undated) DEVICE — Device: Brand: DEFENDO AIR/WATER/SUCTION AND BIOPSY VALVE

## (undated) DEVICE — ENDOSCOPY PACK - LOWER: Brand: MEDLINE INDUSTRIES, INC.

## (undated) DEVICE — TRAP 4 CPTR CHMBR N EZ INLN

## (undated) DEVICE — FORCEP RADIAL JAW 4

## (undated) DEVICE — CLIP RESOLUTION 235CM

## (undated) NOTE — LETTER
AUTHORIZATION FOR SURGICAL OPERATION OR OTHER PROCEDURE    1. I hereby authorize Dr. Deann Blanca, and [facility] staff assigned to my case to perform the following operation and/or procedure at the [facility] site:    _______________________________________________________________________________________________    Endometrial Biopsy  _______________________________________________________________________________________________    2.  My physician has explained the nature and purpose of the operation or other procedure, possible alternative methods of treatment, the risks involved, and the possibility of complication to me.  I acknowledge that no guarantee has been made as to the result that may be obtained.  3.  I recognize that, during the course of this operation, or other procedure, unforseen conditions may necessitate additional or different procedure than those listed above.  I, therefore, further authorize and request that the above named physician, his/her physician assistants or designees perform such procedures as are, in his/her professional opinion, necessary and desirable.  4.  Any tissue or organs removed in the operation or other procedure may be disposed of by and at the discretion of the [facility] and [facility].  5.  I understand that in the event of a medical emergency, I will be transported by local paramedics to [facility] or other hospital emergency department.  6.  I certify that I have read and fully understand the above consent to operation and/or other procedure.    7.  I acknowledge that my physician has explained sedation/analgesia administration to me including the risks and benefits.  I consent to the administration of sedation/analgesia as may be necessary or desirable in the judgement of my physician.    Witness signature: ___________________________________________________ Date:  ______/______/_____                     Time:  ________ A.M.  P.M.       Patient Name:  ______________________________________________________  (please print)      Patient signature:  ___________________________________________________             Relationship to Patient:           []  Parent    Responsible person                          []  Spouse  In case of minor or                    [] Other  _____________   Incompetent name:  __________________________________________________                               (please print)      _____________      Responsible person  In case of minor or  Incompetent signature:  _______________________________________________    Statement of Physician  My signature below affirms that prior to the time of the procedure, I have explained to the patient and/or his/her guardian, the risks and benefits involved in the proposed treatment and any reasonable alternative to the proposed treatment.  I have also explained the risks and benefits involved in the refusal of the proposed treatment and have answered the patient's questions.                        Date:  ______/______/_______  Provider                      Signature:  __________________________________________________________       Time:  ___________ A.M    P.M.

## (undated) NOTE — LETTER
Hospital Discharge Documentation    From: [facility] Hospitalist's Office  Phone: [phone]    Patient discharged time/date: 10/3/2024  2:36 PM  Patient discharge disposition:  [facility]       Discharge Summary - D/C Summary        Discharge Summary signed by Michael Chamberlain MD at 10/4/2024  9:04 AM  Version 1 of 1      Author: Michael Chamberlain MD Service: Internal Medicine Author Type: Physician    Filed: 10/4/2024  9:04 AM Date of Service: 10/4/2024  9:02 AM Status: Signed    : Michael Chamberlain MD (Physician)         [facility]  part of [facility]    Discharge Summary    Julissa Ferro Patient Status:  Inpatient    1945 MRN [id number]   Location [facility] 2W/ Attending No att. providers found   Hosp Day # 6 PCP Jesus Kirkland MD     Date of Admission: 2024     Date of Discharge: 10/03/24      Lace+ Score: 68  59-90 High Risk  29-58 Medium Risk  0-28   Low Risk.    TCM Follow-Up Recommendation:  LACE > 58: High Risk of readmission after discharge from the hospital.    DISCHARGE DX: Principal Problem:    Weakness generalized  Active Problems:    Hypercalcemia    Anemia    Failure to thrive in adult    Severe major depression, single episode, without psychotic features (HCC)    Palliative care by specialist    Dysphagia    Poor appetite    Goals of care, counseling/discussion    Dyspnea       The patient was seen and examined on day of discharge and this discharge summary is in conjunction with any daily progress note from day of discharge.    HPI per admitting physician: \"Julissa Ferro is a(n) 79 year old female, with a past medical history significant for tubular adenomas in the colon with repeat colonoscopy done last month however unable to go past the distal transverse colon presents today with poor appetite creasing weakness and loss of weight over the last few weeks.  Describes the onset as gradual with progressive worsening  now requiring assistance with activities of daily living.  Claims she is unable to get up and move around on her own.  Denies any nausea or vomiting denies any diarrhea or constipation.  Denies any fever or chills denies any significant abdominal pain.  No dysuria\"       Hospital Course:      Acute hypoxemic respiratory failure  Possible Aspiration pneumonia  Worsening overnight  Transferred to ICU on noninvasive ventilation.  Repeat chest x-ray this a.m. reviewed.  Noted moderate left and right pleural effusions associated with atelectasis  Pulm consulted, expressed concerns for possible mucous plugging.  Continue broad-spectrum antibiotics  Attempting to wean off bipap on to NC  Failed speech re-eval  Continue on tube feeds     Failure to thrive  Severe Depression (situational) and asthenia - has not seen or talked to her twin boys in nearly 3 years  hx of mental health problems (SI).   Psych consulted, lito recs  Continue to evaluate.  Palliative care on consult, may need discussion regarding G-tube in future.     Urinary retention  Bilateral non-obstructing calculus.   Seen by urology  Plan to cont anaya for now  Repeat renal us to assess for improvement     Normocytic anemia  Iron deficiency  Heme/onc following  Transfuse for hemoglobin less than 7  fecal occult blood negative  Continue ferric gluconate      Hypercalcemia  Dehydration  Severe protein caloric malnutrition, BMI 17  Weaning IV fluids initiated  Will hold calcium supplements for now,  PTH levels are low.    PT/OT for deconditioning    After further discussion with hospice team, pt's  made decision to pursue comfort care only with hospice. Pt was discharged and readmitted under hospice care.       CULTURE:   Hospital Encounter on 09/26/24   1. Urine Culture, Routine     Status: None    Collection Time: 09/26/24 10:36 PM    Specimen: Urine, clean catch   Result Value Ref Range    Urine Culture  N/A     <10,000 cfu/ml Multiple species present-  probable contamination.       IMAGING STUDIES: SOME MAY NEED FOLLOW UP WITH PCP   XR CHEST AP PORTABLE  (CPT=71045)    Result Date: 10/3/2024  CONCLUSION:   Mild interstitial and alveolar edema, slightly increased.  Superimposed aspiration/pneumonia is a consideration.  Suspected trace bilateral pleural effusions.  Dobbhoff tube tip at the proximal stomach.  Recommend advancement if positioning is desired within the second portion of the duodenum.  Previously seen right basilar opacity no longer visualized.     Dictated by (CST): Ariane Muro MD on 10/03/2024 at 3:58 PM     Finalized by (CST): Ariane Muro MD on 10/03/2024 at 4:00 PM          XR CHEST AP PORTABLE  (CPT=71045)    Result Date: 10/3/2024  CONCLUSION:  1. There has been removal of the previously seen large-bore Dobbhoff tube.  2. Moderate left larger than right pleural effusions and associated basilar atelectasis, with or without superimposed pneumonia.  3. Redemonstration of a lobulated opacity at the right lung base. Continued surveillance is advised.   A preliminary report was issued by the [facility] teleradiology service. There are no major discrepancies.   Dictated by (CST): Alfredo Deal MD on 10/03/2024 at 7:41 AM     Finalized by (CST): Alfredo Deal MD on 10/03/2024 at 7:44 AM          MRI SPINE CERVICAL (CPT=72141)    Result Date: 10/2/2024  CONCLUSION:  1. Normal cervical and upper thoracic cord through T3. 2. Multilevel degenerative disc disease/spondylosis.  Disc osteophyte complex at C4-5 results in mild ventral cord contact and mild central narrowing.  No moderate or high-grade central narrowing. 3. Image degradation by patient related motion artifact.    Dictated by (CST): Jomar Andre MD on 10/02/2024 at 5:13 PM     Finalized by (CST): Jomar Andre MD on 10/02/2024 at 5:23 PM          MRI BRAIN (CPT=70551)    Result Date: 10/2/2024  CONCLUSION:   1. No acute intracranial abnormality. 2. Moderate global parenchymal  volume loss. 3. 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  4. Lesser incidental findings described above.    Dictated by (CST): Jay Jay Golden MD on 10/02/2024 at 2:58 PM     Finalized by (CST): Jay Jay Golden MD on 10/02/2024 at 3:06 PM          XR CHEST AP PORTABLE  (CPT=71045)    Result Date: 10/2/2024  CONCLUSION:   New ovoid opacity at the right lung base.  Could represent round pneumonia or atelectasis.  Recommend attention on follow-up.  Small left pleural effusion with left basilar opacity, unchanged.    Dictated by (CST): Ariane Muro MD on 10/02/2024 at 7:46 AM     Finalized by (CST): Ariane Muro MD on 10/02/2024 at 7:48 AM          XR CHEST AP PORTABLE  (CPT=71045)    Result Date: 10/1/2024  CONCLUSION:  1. Feeding tube tip in proximal gastric antrum. 2. Bibasilar atelectasis and or pneumonia.  Improved aeration right lung base.    Dictated by (CST): Jomar Andre MD on 10/01/2024 at 4:54 PM     Finalized by (CST): Jomar Andre MD on 10/01/2024 at 4:55 PM          US VENOUS DOPPLER LEG BILAT - DIAG IMG (CPT=93970)    Result Date: 9/30/2024  CONCLUSION: Normal examination.     Dictated by (CST): Jordan Dawn MD on 9/30/2024 at 2:30 PM     Finalized by (CST): Jordan Dawn MD on 9/30/2024 at 2:31 PM          XR CHEST AP PORTABLE  (CPT=71045)    Result Date: 9/30/2024  CONCLUSION:  1. Borderline cardiomegaly.  Tortuous aorta. 2. Bibasilar parenchymal abnormality and/or atelectatic changes, worse on the left. 3. Small bilateral effusions, greater on left.  Improved aeration right lung base..    Dictated by (CST): Adonis Issa MD on 9/30/2024 at 8:04 AM     Finalized by (CST): Adonis Issa MD on 9/30/2024 at 8:05 AM          CT CHEST PE AORTA (IV ONLY) (CPT=71260)    Result Date: 9/29/2024  CONCLUSION:   No evidence of pulmonary embolism to the bilateral segmental arterial level.  Small  bilateral pleural effusions.  Near complete bilateral lower lobe consolidation which could reflect atelectasis with or without superimposed pneumonia.  Mild patchy ground-glass opacity in the superior segment right lower lobe and right upper lobe with mild reticulonodular opacity which may be infectious/inflammatory in etiology.  Small pericardial effusion.  Lesser incidental findings as above.    Dictated by (CST): Feng Carranza MD on 9/29/2024 at 1:10 PM     Finalized by (CST): Feng Carranza MD on 9/29/2024 at 1:16 PM          XR CHEST AP PORTABLE  (CPT=71045)    Result Date: 9/29/2024  CONCLUSION:  Right greater than left bilateral basilar opacities increased on the right.  On the right, this may reflect atelectasis with or without superimposed pneumonia.  Left basilar opacity may reflect a combination of small effusion and parenchymal opacity.       Dictated by (CST): Feng Carranza MD on 9/29/2024 at 11:20 AM     Finalized by (CST): Feng Carranza MD on 9/29/2024 at 11:22 AM          XR CHEST AP PORTABLE  (CPT=71045)    Result Date: 9/28/2024  CONCLUSION:   Left greater than right basilar opacities, which may reflect atelectasis with or without superimposed pneumonia.  Mild elevation of the right hemidiaphragm.    Dictated by (CST): Jay Jay Golden MD on 9/28/2024 at 4:11 PM     Finalized by (CST): Jay Jay Golden MD on 9/28/2024 at 4:13 PM          CT CHEST+ABDOMEN+PELVIS(CPT=71250/90045)    Result Date: 9/27/2024  CONCLUSION:  1. No evidence of a primary malignancy. 2. Small amount of ascites.  Mild body wall edema. 3. Small pericardial effusion. 4. Marked distention of urinary bladder associated with mild to moderate bilateral hydronephrosis and mild hydroureter.  Findings may be related to reflux. 5. Nonobstructing bilateral renal calculi.  No ureteral calculus. 6. Colonic diverticulosis.  Moderate amount of stool in the colon. 7. Atherosclerotic vascular calcification including coronary  artery calcification.    Dictated by (CST): Jomar Andre MD on 9/27/2024 at 8:39 PM     Finalized by (CST): Jomar Andre MD on 9/27/2024 at 8:58 PM           LABS :     Lab Results   Component Value Date    WBC 4.1 10/02/2024    HGB 10.4 (L) 10/02/2024    HCT 31.2 (L) 10/02/2024    .0 (H) 10/02/2024    CREATSERUM 1.61 (H) 10/02/2024    BUN 46 (H) 10/02/2024     10/02/2024    K 3.9 10/02/2024     10/02/2024    CO2 22.0 10/02/2024     (H) 10/02/2024    CA 10.2 10/02/2024    ALB 2.4 (L) 09/30/2024    ALKPHO 84 09/30/2024    BILT 0.9 09/30/2024    TP 5.2 (L) 09/30/2024    AST 36 (H) 09/30/2024    ALT 10 09/30/2024    T4F 0.9 10/02/2024    TSH 1.720 10/02/2024    DDIMER 2.80 (H) 09/28/2024    ESRML 25 10/02/2024    CRP 4.60 (H) 01/31/2024    MG 2.7 (H) 10/02/2024    PHOS 4.2 10/02/2024    CK 93 10/02/2024    B12 582 09/27/2024       Recent Labs   Lab 09/30/24  0516 10/01/24  0405 10/02/24  0404   RBC 3.26* 3.73* 3.85   HGB 8.9* 10.1* 10.4*   HCT 29.2* 30.6* 31.2*   MCV 89.6 82.0 81.0   MCH 27.3 27.1 27.0   MCHC 30.5* 33.0 33.3   RDW 17.1* 16.9* 17.0*   NEPRELIM 13.77* 5.93 3.52   WBC 14.6* 6.8 4.1   .0 413.0 481.0*     Recent Labs   Lab 09/27/24  1235 09/27/24  1435 09/30/24  0514 10/01/24  0405 10/02/24  0404 10/02/24  1604   GLU  --    < > 73 104* 132*  --    BUN  --    < > 17 36* 46*  --    CREATSERUM  --    < > 1.05* 1.30* 1.61*  --    CA  --    < > 10.7* 10.4 10.2  --    ALB 2.41*  --  2.4*  --   --   --    NA  --    < > 131* 136 136  --    K  --    < > 4.1 3.5 2.9* 3.9   CL  --    < > 104 103 104  --    CO2  --    < > 19.0* 23.0 22.0  --    ALKPHO  --   --  84  --   --   --    AST  --   --  36*  --   --   --    ALT  --   --  10  --   --   --    BILT  --   --  0.9  --   --   --    TP 5.1*  --  5.2*  --   --   --     < > = values in this interval not displayed.     No results found for: \"PT\", \"INR\"    Disposition: Discharge and readmit to hospice    Condition at Discharge:  Stable    Michael Chamberlain MD          Electronically signed by Michael Chamberlain MD on 10/4/2024  9:04 AM

## (undated) NOTE — MR AVS SNAPSHOT
GREGORY Embudo  [address] [place] [address]  [phone]               Thank you for choosing us for your health care visit with Fritz Curling. MD Isael.  We are glad to serve you and happy to provide you with this summary of your visit.   Roni Vitamin D, 25-Hydroxy [E]    Complete by:  Jun 07, 2017 (Approximate)    Assoc Dx:  Hypercholesterolemia [E78.00], Injury to nerves [T14.8], Osteopenia of thigh, unspecified laterality [M85.859], Screening for malignant neoplasm of breast [Z12.39], Postme It is the patient's responsibility to check with and follow their insurance company's guidelines for prior authorization for this test.  You may be held responsible for payment in full if proper authorization is not acquired.   Please contact the Patient Bu Modification Recommendation   Weight Reduction Maintain normal body weight (body mass index 18.5-24.9 kg/m2)   DASH eating plan Adopt a diet rich in fruits, vegetables, and low fat dairy products with reduced content of saturated and total fat.    Dietary s Don’t forget strength training with weights and resistance Set goals and track your progress   You don’t need to join a gym. Home exercises work great.  Put more priority on exercise in your life                    Visit [facility] online at

## (undated) NOTE — ED AVS SNAPSHOT
Willie Jaquez   MRN: [id number]    Department:  [facility] Emergency Department   Date of Visit:  12/12/2018           Disclosure     Insurance plans vary and the physician(s) referred by the ER may not be covered by your plan.  Please contact within the next three months to obtain basic health screening including reassessment of your blood pressure.     IF THERE IS ANY CHANGE OR WORSENING OF YOUR CONDITION, CALL YOUR PRIMARY CARE PHYSICIAN AT ONCE OR RETURN IMMEDIATELY TO THE EMERGENCY DEPARTMEN

## (undated) NOTE — LETTER
Hospital Discharge Documentation    From: [facility] Hospitalist's Office  Phone: [phone]    Patient discharged time/date: 10/7/2024  12:46am  Patient discharge disposition:   Hospice